# Patient Record
Sex: MALE | Race: WHITE | NOT HISPANIC OR LATINO | Employment: FULL TIME | URBAN - METROPOLITAN AREA
[De-identification: names, ages, dates, MRNs, and addresses within clinical notes are randomized per-mention and may not be internally consistent; named-entity substitution may affect disease eponyms.]

---

## 2017-06-22 DIAGNOSIS — E78.5 HYPERLIPIDEMIA LDL GOAL <130: ICD-10-CM

## 2017-06-22 NOTE — TELEPHONE ENCOUNTER
Pravastatin     Last Written Prescription Date: 05/25/16  Last Fill Quantity: 90, # refills: 3  Last Office Visit with G, P or [facility] prescribing provider: 09/15/16       Lab Results   Component Value Date    CHOL 203 05/21/2016     Lab Results   Component Value Date    HDL 36 05/21/2016     Lab Results   Component Value Date     05/21/2016     Lab Results   Component Value Date    TRIG 234 05/21/2016     Lab Results   Component Value Date    CHOLHDLRATIO 7.1 04/20/2015

## 2017-06-27 RX ORDER — PRAVASTATIN SODIUM 40 MG
TABLET ORAL
Qty: 30 TABLET | Refills: 0 | Status: SHIPPED | OUTPATIENT
Start: 2017-06-27 | End: 2017-08-09

## 2017-06-27 NOTE — TELEPHONE ENCOUNTER
Pravastatin  Last Written Prescription Date: 5/25/16  Last Fill Quantity: 90, # refills: 3  Last Office Visit with [facility], [facility] or [facility] prescribing provider: 9/15/16       Lab Results   Component Value Date    CHOL 203 05/21/2016     Lab Results   Component Value Date    HDL 36 05/21/2016     Lab Results   Component Value Date     05/21/2016     Lab Results   Component Value Date    TRIG 234 05/21/2016     Lab Results   Component Value Date    CHOLHDLRATIO 7.1 04/20/2015       Patient due for appt and labs  Patient has one week left of medication  Sent 30 days of Pravastatin to pharmacy  Appt scheduled 7/10/17  Transferred patient to appt line to schedule lab only appt  Future labs were ordered 5/25/16, lab reports they can extend those orders if he plans to come in soon  Lab will let us know if patient's labs need to be reordered    Jessica BAUTISTA Rn

## 2017-07-05 DIAGNOSIS — E03.9 HYPOTHYROIDISM, UNSPECIFIED TYPE: ICD-10-CM

## 2017-07-05 NOTE — LETTER
[facility]  [address] [address]  Phone: [phone]       July 13, 2017         Micah Lay  [address] [address] [facility] [address]            Dear Micah:    We are concerned about your health care.  We recently provided you with medication refills.  Many medications require routine follow-up with your doctor.  You are due for lab work and a clinic appointment for further refills of your medication.    Your prescription(s) have been refilled for one month so you may have time for the above noted follow-up. Please call to schedule soon so we can assure you have an appointment before your next refills are needed.    Thank you,      Dr. Barriga's Care Team

## 2017-07-06 NOTE — TELEPHONE ENCOUNTER
Levothyroxine     Last Written Prescription Date: 05/25/16  Last Quantity: 180, # refills: 3  Last Office Visit with G, [facility] or [facility] prescribing provider: 09/15/16   Next 5 appointments (look out 90 days)     Jul 10, 2017  6:00 PM CDT   [facility] CNP   [facility]    [address] [address]   [phone]                   TSH   Date Value Ref Range Status   05/21/2016 0.85 0.40 - 4.00 mU/L Final

## 2017-07-13 RX ORDER — LEVOTHYROXINE SODIUM 150 UG/1
TABLET ORAL
Qty: 60 TABLET | Refills: 0 | Status: SHIPPED | OUTPATIENT
Start: 2017-07-13 | End: 2017-08-15

## 2017-07-13 NOTE — TELEPHONE ENCOUNTER
Patient due for appointment and labs.  Refilled x1 per protocol.  Letter mailed to patient.    Lizeth Porter RN

## 2017-08-09 DIAGNOSIS — E78.5 HYPERLIPIDEMIA LDL GOAL <130: ICD-10-CM

## 2017-08-09 NOTE — TELEPHONE ENCOUNTER
Pravastatin     Last Written Prescription Date: 06/27/2017  Last Fill Quantity: 30, # refills: 0  Last Office Visit with [facility], [facility] or [facility] prescribing provider: 09/15/2016       Lab Results   Component Value Date    CHOL 203 05/21/2016     Lab Results   Component Value Date    HDL 36 05/21/2016     Lab Results   Component Value Date     05/21/2016     Lab Results   Component Value Date    TRIG 234 05/21/2016     Lab Results   Component Value Date    CHOLHDLRATIO 7.1 04/20/2015       Zurdo DriscollR)

## 2017-08-11 RX ORDER — PRAVASTATIN SODIUM 40 MG
TABLET ORAL
Qty: 30 TABLET | Refills: 0 | Status: SHIPPED | OUTPATIENT
Start: 2017-08-11 | End: 2017-09-11

## 2017-08-11 NOTE — TELEPHONE ENCOUNTER
Routing refill request to provider for review/approval because:  Sima given x1 and patient did not follow up, please advise  Labs not current:  Lab Results   Component Value Date      05/21/2016     Pleas advise refill.  GAVI Jackson

## 2017-08-15 ENCOUNTER — TELEPHONE (OUTPATIENT)
Dept: FAMILY MEDICINE | Facility: CLINIC | Age: 52
End: 2017-08-15

## 2017-08-15 DIAGNOSIS — E03.9 HYPOTHYROIDISM, UNSPECIFIED TYPE: ICD-10-CM

## 2017-08-15 NOTE — TELEPHONE ENCOUNTER
Levothyroxine     Last Written Prescription Date: 07/13/2017  Last Quantity: 60, # refills: 0  Last Office Visit with G, P or [facility] prescribing provider: 09/15/2016        TSH   Date Value Ref Range Status   05/21/2016 0.85 0.40 - 4.00 mU/L Final       Zurdo ALLISON (R)

## 2017-08-17 RX ORDER — LEVOTHYROXINE SODIUM 150 UG/1
TABLET ORAL
Qty: 30 TABLET | Refills: 0 | Status: SHIPPED | OUTPATIENT
Start: 2017-08-17 | End: 2017-09-01

## 2017-08-17 NOTE — TELEPHONE ENCOUNTER
Routing refill request to provider for review/approval because:  Sima given x1 and patient did not follow up, please advise  Labs not current:  Thyroid  Lab Results   Component Value Date    TSH 0.85 05/21/2016    TSH 6.56 04/20/2015         Millicent Chacon RNC

## 2017-08-21 DIAGNOSIS — N32.89 BLADDER SPASMS: ICD-10-CM

## 2017-08-21 NOTE — TELEPHONE ENCOUNTER
Oxybutynin      Last Written Prescription Date: 09/15/16  Last Fill Quantity: 30,  # refills: 11   Last Office Visit with G, [facility] or [facility] prescribing provider: 09/15/16

## 2017-08-21 NOTE — LETTER
[facility]  [address] [address]  Phone: [phone]       August 30, 2017         Micah Lay  [address] [address] [facility] [address]            Dear Micah:    We are concerned about your health care.  We recently provided you with medication refills.  Many medications require routine follow-up with your doctor.  You are due for a clinic appointment with Dr. Barriga for further refills of your medications.    Your prescription(s) have been refilled for one month so you may have time for the above noted follow-up. Please call to schedule soon so we can assure you have an appointment before your next refills are needed.    Thank you,      Dr. Barriga's Care Team

## 2017-08-30 RX ORDER — OXYBUTYNIN CHLORIDE 5 MG/1
TABLET ORAL
Qty: 30 TABLET | Refills: 0 | Status: SHIPPED | OUTPATIENT
Start: 2017-08-30 | End: 2017-09-11

## 2017-09-11 ENCOUNTER — OFFICE VISIT (OUTPATIENT)
Dept: FAMILY MEDICINE | Facility: CLINIC | Age: 52
End: 2017-09-11
Payer: COMMERCIAL

## 2017-09-11 VITALS
TEMPERATURE: 96.5 F | SYSTOLIC BLOOD PRESSURE: 133 MMHG | BODY MASS INDEX: 31.67 KG/M2 | HEART RATE: 62 BPM | DIASTOLIC BLOOD PRESSURE: 88 MMHG | WEIGHT: 209 LBS | HEIGHT: 68 IN

## 2017-09-11 DIAGNOSIS — N32.89 BLADDER SPASMS: ICD-10-CM

## 2017-09-11 DIAGNOSIS — Z12.11 SPECIAL SCREENING FOR MALIGNANT NEOPLASMS, COLON: ICD-10-CM

## 2017-09-11 DIAGNOSIS — E03.9 HYPOTHYROIDISM, UNSPECIFIED TYPE: Primary | ICD-10-CM

## 2017-09-11 DIAGNOSIS — E78.5 HYPERLIPIDEMIA LDL GOAL <130: ICD-10-CM

## 2017-09-11 DIAGNOSIS — Z12.5 SPECIAL SCREENING FOR MALIGNANT NEOPLASM OF PROSTATE: ICD-10-CM

## 2017-09-11 LAB
ALT SERPL W P-5'-P-CCNC: 37 U/L (ref 0–70)
CHOLEST SERPL-MCNC: 230 MG/DL
HDLC SERPL-MCNC: 38 MG/DL
LDLC SERPL CALC-MCNC: 135 MG/DL
NONHDLC SERPL-MCNC: 192 MG/DL
PSA SERPL-ACNC: 1.11 UG/L (ref 0–4)
T4 FREE SERPL-MCNC: 1.43 NG/DL (ref 0.76–1.46)
TRIGL SERPL-MCNC: 283 MG/DL
TSH SERPL DL<=0.005 MIU/L-ACNC: 1.53 MU/L (ref 0.4–4)

## 2017-09-11 PROCEDURE — 99214 OFFICE O/P EST MOD 30 MIN: CPT | Performed by: FAMILY MEDICINE

## 2017-09-11 PROCEDURE — 84443 ASSAY THYROID STIM HORMONE: CPT | Performed by: FAMILY MEDICINE

## 2017-09-11 PROCEDURE — G0103 PSA SCREENING: HCPCS | Performed by: FAMILY MEDICINE

## 2017-09-11 PROCEDURE — 80061 LIPID PANEL: CPT | Performed by: FAMILY MEDICINE

## 2017-09-11 PROCEDURE — 84439 ASSAY OF FREE THYROXINE: CPT | Performed by: FAMILY MEDICINE

## 2017-09-11 PROCEDURE — 84460 ALANINE AMINO (ALT) (SGPT): CPT | Performed by: FAMILY MEDICINE

## 2017-09-11 PROCEDURE — 36415 COLL VENOUS BLD VENIPUNCTURE: CPT | Performed by: FAMILY MEDICINE

## 2017-09-11 RX ORDER — PRAVASTATIN SODIUM 40 MG
40 TABLET ORAL DAILY
Qty: 90 TABLET | Refills: 3 | Status: CANCELLED | OUTPATIENT
Start: 2017-09-11

## 2017-09-11 RX ORDER — LEVOTHYROXINE SODIUM 150 UG/1
300 TABLET ORAL DAILY
Qty: 180 TABLET | Refills: 3 | Status: SHIPPED | OUTPATIENT
Start: 2017-09-11 | End: 2018-09-30

## 2017-09-11 RX ORDER — PRAVASTATIN SODIUM 40 MG
40 TABLET ORAL DAILY
Qty: 90 TABLET | Refills: 3 | Status: SHIPPED | OUTPATIENT
Start: 2017-09-11 | End: 2018-10-18

## 2017-09-11 RX ORDER — OXYBUTYNIN CHLORIDE 5 MG/1
5 TABLET ORAL DAILY
Qty: 90 TABLET | Refills: 1 | Status: SHIPPED | OUTPATIENT
Start: 2017-09-11 | End: 2017-09-28

## 2017-09-11 NOTE — PATIENT INSTRUCTIONS
Thank you for choosing [facility].  You may be receiving a survey in the mail from Sari Mace regarding your visit today.  Please take a few minutes to complete and return the survey to let us know how we are doing.      If you have questions or concerns, please contact us via [employer] or you can contact your care team at [phone].    Our Clinic hours are:  Monday 6:40 am  to 7:00 pm  Tuesday -Friday 6:40 am to 5:00 pm    The [place] outpatient lab hours are:  Monday - Friday 6:10 am to 4:45 pm  Saturdays 7:00 am to 11:00 am  Appointments are required, call [phone]    If you have clinical questions after hours or would like to schedule an appointment,  call the clinic at [phone].

## 2017-09-11 NOTE — PROGRESS NOTES
SUBJECTIVE:   Micah Lay is a 51 year old male who presents to clinic today for the following health issues:    Hyperlipidemia Follow-Up      Rate your low fat/cholesterol diet?: fair    Taking statin?  Yes, no muscle aches from statin    Other lipid medications/supplements?:  none    Hypothyroidism Follow-up      Since last visit, patient describes the following symptoms: Weight stable, no hair loss, no skin changes, no constipation, no loose stools        Amount of exercise or physical activity: work related     Problems taking medications regularly: No    Medication side effects: none  Diet: low fat/cholesterol    Patient will need refill on all medication listed below but would like an increase in oxybutynin and will wait for blood test on thyroid to see if same dose   Medication Followup of Pravastatin oxybutynin Levothyroxine     Taking Medication as prescribed: yes    Side Effects:  None    Medication Helping Symptoms:  Yes but oxybutynin needs to be increased          Patient is a 51 yr old male who comes in for his medication refills. Patient has a past medical history that is significant for hyperlipidemia , urinary frequency , and hypothyroidism. He reports that he has been working on losing weight and has lost about twenty pounds since his last visit and he is making some positive steps towards living healthier. Patient states that his cholesterol medication gives him mild aches and pains and was wondering if there were any alternatives. At some point he was prescribed some Crestor but cost was an issue.   He had a swelling in his sternum that he wanted us to take a look at. This swelling is not painful. It does not affect his breathing in any way.    Problem list and histories reviewed & adjusted, as indicated.  Additional history: as documented    Patient Active Problem List   Diagnosis     Screening for hypertension     Family history of colon cancer     Family history of aneurysm      "HYPERLIPIDEMIA LDL GOAL <130     Hypothyroidism, unspecified type     Bladder spasms     No past surgical history on file.    Social History   Substance Use Topics     Smoking status: Never Smoker     Smokeless tobacco: Never Used     Alcohol use No     Family History   Problem Relation Age of Onset     Arthritis Mother      Thyroid Disease Mother      Lipids Mother      Hypertension Father      Allergies Son      Neurologic Disorder Son      taking concerta     Connective Tissue Disorder Maternal Grandfather      Hypertension Brother      Obesity Brother      Lipids Brother      Cardiovascular Brother      luan         Current Outpatient Prescriptions   Medication Sig Dispense Refill     oxybutynin (DITROPAN) 5 MG tablet TAKE 1 TABLET BY MOUTH DAILY 30 tablet 0     levothyroxine (SYNTHROID/LEVOTHROID) 150 MCG tablet TAKE 2 TABLETS ONCE DAILY FOR TOTAL DAILY DOSE OF 300MCG. 30 tablet 0     pravastatin (PRAVACHOL) 40 MG tablet TAKE ONE TABLET BY MOUTH ONE TIME DAILY 30 tablet 0     Melatonin (MELATONIN TR) 10 MG TBCR Take 1 tablet by mouth At Bedtime       No Known Allergies  BP Readings from Last 3 Encounters:   09/11/17 133/88   09/15/16 (!) 137/92   05/25/16 129/85    Wt Readings from Last 3 Encounters:   09/11/17 209 lb (94.8 kg)   09/15/16 218 lb (98.9 kg)   05/25/16 216 lb 3.2 oz (98.1 kg)                  Labs reviewed in EPIC        Reviewed and updated as needed this visit by clinical staffAllergies       Reviewed and updated as needed this visit by Provider         ROS:  Constitutional, HEENT, cardiovascular, pulmonary, gi and gu systems are negative, except as otherwise noted.      OBJECTIVE:   /88  Pulse 62  Temp 96.5  F (35.8  C) (Tympanic)  Ht 5' 8.25\" (1.734 m)  Wt 209 lb (94.8 kg)  BMI 31.55 kg/m2  Body mass index is 31.55 kg/(m^2).  GENERAL: healthy, alert and no distress  EYES: Eyes grossly normal to inspection, PERRL and conjunctivae and sclerae normal  HENT: ear canals and TM's " normal, nose and mouth without ulcers or lesions  NECK: no adenopathy, no asymmetry, masses, or scars and thyroid normal to palpation  RESP: lungs clear to auscultation - no rales, rhonchi or wheezes  CV: regular rate and rhythm, normal S1 S2, no S3 or S4, no murmur, click or rub, no peripheral edema and peripheral pulses strong  ABDOMEN: soft, nontender, no hepatosplenomegaly, no masses and bowel sounds normal  MS: no gross musculoskeletal defects noted, no edema  SKIN: no suspicious lesions or rashes  PSYCH: mentation appears normal, affect normal/bright    Diagnostic Test Results:  Results for orders placed or performed in visit on 09/11/17 (from the past 24 hour(s))   ALT   Result Value Ref Range    ALT 37 0 - 70 U/L   Lipid panel reflex to direct LDL   Result Value Ref Range    Cholesterol 230 (H) <200 mg/dL    Triglycerides 283 (H) <150 mg/dL    HDL Cholesterol 38 (L) >39 mg/dL    LDL Cholesterol Calculated 135 (H) <100 mg/dL    Non HDL Cholesterol 192 (H) <130 mg/dL   TSH   Result Value Ref Range    TSH 1.53 0.40 - 4.00 mU/L   T4 FREE   Result Value Ref Range    T4 Free 1.43 0.76 - 1.46 ng/dL   PSA, screen   Result Value Ref Range    PSA 1.11 0 - 4 ug/L       ASSESSMENT/PLAN:   (E03.9) Hypothyroidism, unspecified type  (primary encounter diagnosis)  Comment: ;labs within normal limits, did not change dose of levothyroxine   Plan: levothyroxine (SYNTHROID/LEVOTHROID) 150 MCG         tablet            (E78.5) Hyperlipidemia LDL goal <130  Comment: Cholesterol still high , medication faxed  Plan: pravastatin (PRAVACHOL) 40 MG tablet            (Z12.5) Special screening for malignant neoplasm of prostate  Comment: Patient will be notified of results  Plan: PSA, screen            (Z12.11) Special screening for malignant neoplasms, colon  Comment: Colonoscopy ordered  Plan: GASTROENTEROLOGY ADULT REF PROCEDURE ONLY            (N32.89) Bladder spasms  Comment: Medication refilled  Plan: oxybutynin (DITROPAN) 5 MG  tablet              FUTURE APPOINTMENTS:       - Follow-up visit as needed.    Nadir Flaherty MD  [facility]

## 2017-09-11 NOTE — LETTER
September 11, 2017      Micah MOREIRA Alireza  [address] [address] [facility] [address]        Dear ,    We are writing to inform you of your test results.   Normal except lipids. The trig are stable.   The LDL is slightly high but no med change. Just stay on the diet more   Rigorously.     Resulted Orders   ALT   Result Value Ref Range    ALT 37 0 - 70 U/L   Lipid panel reflex to direct LDL   Result Value Ref Range    Cholesterol 230 (H) <200 mg/dL      Comment:      Desirable:       <200 mg/dl    Triglycerides 283 (H) <150 mg/dL      Comment:      Borderline high:  150-199 mg/dl  High:             200-499 mg/dl  Very high:       >499 mg/dl      HDL Cholesterol 38 (L) >39 mg/dL    LDL Cholesterol Calculated 135 (H) <100 mg/dL      Comment:      Above desirable:  100-129 mg/dl  Borderline High:  130-159 mg/dL  High:             160-189 mg/dL  Very high:       >189 mg/dl      Non HDL Cholesterol 192 (H) <130 mg/dL      Comment:      Above Desirable:  130-159 mg/dl  Borderline high:  160-189 mg/dl  High:             190-219 mg/dl  Very high:       >219 mg/dl     TSH   Result Value Ref Range    TSH 1.53 0.40 - 4.00 mU/L   T4 FREE   Result Value Ref Range    T4 Free 1.43 0.76 - 1.46 ng/dL       If you have any questions or concerns, please call the clinic at the number listed above.       Sincerely,    Dr. Barriga

## 2017-09-11 NOTE — MR AVS SNAPSHOT
After Visit Summary   9/11/2017    Micah Lay    MRN: [id number]           Patient Information     Date Of Birth          1965        Visit Information        Provider Department      9/11/2017 10:00 AM Nadir Flaherty MD [facility]        Today's Diagnoses     Special screening for malignant neoplasm of prostate    -  1    Hypothyroidism, unspecified type        Hyperlipidemia LDL goal <130        Special screening for malignant neoplasms, colon          Care Instructions          Thank you for choosing [facility].  You may be receiving a survey in the mail from Sari Mace regarding your visit today.  Please take a few minutes to complete and return the survey to let us know how we are doing.      If you have questions or concerns, please contact us via [employer] or you can contact your care team at [phone].    Our Clinic hours are:  Monday 6:40 am  to 7:00 pm  Tuesday -Friday 6:40 am to 5:00 pm    The [place] outpatient lab hours are:  Monday - Friday 6:10 am to 4:45 pm  Saturdays 7:00 am to 11:00 am  Appointments are required, call [phone]    If you have clinical questions after hours or would like to schedule an appointment,  call the clinic at [phone].          Follow-ups after your visit        Additional Services     GASTROENTEROLOGY ADULT REF PROCEDURE ONLY       Last Lab Result: Creatinine (mg/dL)       Date                     Value                 06/28/2014               0.91             ----------  Body mass index is 31.55 kg/(m^2).     Needed:  No  Language:  English    Patient will be contacted to schedule procedure.     Please be aware that coverage of these services is subject to the terms and limitations of your health insurance plan.  Call member services at your health plan with any benefit or coverage questions.  Any procedures must be performed at a [place] facility OR coordinated by your clinic's referral  "office.    Please bring the following with you to your appointment:    (1) Any X-Rays, CTs or MRIs which have been performed.  Contact the facility where they were done to arrange for  prior to your scheduled appointment.    (2) List of current medications   (3) This referral request   (4) Any documents/labs given to you for this referral                  Who to contact     If you have questions or need follow up information about today's clinic visit or your schedule please contact [facility] directly at [phone].  Normal or non-critical lab and imaging results will be communicated to you by [employer], letter or phone within 4 business days after the clinic has received the results. If you do not hear from us within 7 days, please contact the clinic through [employer] or phone. If you have a critical or abnormal lab result, we will notify you by phone as soon as possible.  Submit refill requests through [employer] or call your pharmacy and they will forward the refill request to us. Please allow 3 business days for your refill to be completed.          Additional Information About Your Visit        [employer] Information     [employer] gives you secure access to your electronic health record. If you see a primary care provider, you can also send messages to your care team and make appointments. If you have questions, please call your primary care clinic.  If you do not have a primary care provider, please call [phone] and they will assist you.        Care EveryWhere ID     This is your Care EveryWhere ID. This could be used by other organizations to access your [place] medical records  [id number]        Your Vitals Were     Pulse Temperature Height BMI (Body Mass Index)          62 96.5  F (35.8  C) (Tympanic) 5' 8.25\" (1.734 m) 31.55 kg/m2         Blood Pressure from Last 3 Encounters:   09/11/17 133/88   09/15/16 (!) 137/92   05/25/16 129/85    Weight from Last 3 Encounters:   09/11/17 209 lb " (94.8 kg)   09/15/16 218 lb (98.9 kg)   05/25/16 216 lb 3.2 oz (98.1 kg)              We Performed the Following     ALT     GASTROENTEROLOGY ADULT REF PROCEDURE ONLY     Lipid panel reflex to direct LDL     PSA, screen     T4 FREE     TSH        Primary Care Provider Office Phone # Fax #    Cassius Barriga [phone] [address] [facility] [address]        Equal Access to Services     VANESSA ORTIZ : Hadii aad ku hadasho Soomaali, waaxda luqadaha, qaybta kaalmada adeegyada, waxay idiin hayaan adeeg rishielaineluzmaria dunn . So [facility] [phone].    ATENCIÓN: Si amarilis espalexia, tiene a joaquin disposición servicios gratuitos de asistencia lingüística. [facility] [phone].    We comply with applicable federal civil rights laws and [place] laws. We do not discriminate on the basis of race, color, national origin, age, disability sex, sexual orientation or gender identity.            Thank you!     Thank you for choosing [facility]  for your care. Our goal is always to provide you with excellent care. Hearing back from our patients is one way we can continue to improve our services. Please take a few minutes to complete the written survey that you may receive in the mail after your visit with us. Thank you!             Your Updated Medication List - Protect others around you: Learn how to safely use, store and throw away your medicines at www.disposemymeds.org.          This list is accurate as of: 9/11/17 10:39 AM.  Always use your most recent med list.                   Brand Name Dispense Instructions for use Diagnosis    levothyroxine 150 MCG tablet    SYNTHROID/LEVOTHROID    30 tablet    TAKE 2 TABLETS ONCE DAILY FOR TOTAL DAILY DOSE OF 300MCG.    Hypothyroidism, unspecified type       MELATONIN TR 10 MG Tbcr   Generic drug:  Melatonin      Take 1 tablet by mouth At Bedtime        oxybutynin 5 MG tablet    DITROPAN    30 tablet    TAKE 1 TABLET BY MOUTH DAILY    Bladder spasms        pravastatin 40 MG tablet    PRAVACHOL    30 tablet    TAKE ONE TABLET BY MOUTH ONE TIME DAILY    Hyperlipidemia LDL goal <130

## 2017-09-28 ENCOUNTER — TELEPHONE (OUTPATIENT)
Dept: FAMILY MEDICINE | Facility: CLINIC | Age: 52
End: 2017-09-28

## 2017-09-28 DIAGNOSIS — N32.89 BLADDER SPASMS: ICD-10-CM

## 2017-09-28 RX ORDER — OXYBUTYNIN CHLORIDE 10 MG/1
10 TABLET, EXTENDED RELEASE ORAL DAILY
Qty: 30 TABLET | Refills: 11 | Status: SHIPPED | OUTPATIENT
Start: 2017-09-28 | End: 2018-10-08

## 2017-09-28 NOTE — TELEPHONE ENCOUNTER
Please notify prescription sent to pharmacy.  I increased the dose to 10 mg daily of the XR type. If this is effective  Then refills are done. Cassius Barriga

## 2017-09-28 NOTE — TELEPHONE ENCOUNTER
Reason for Call:  Other wrong strength sent in.    Detailed comments: pt calling in stating he had Oxybutynin sent in for 5 mg on 9-11-17. He was wondering if he could get 7 mg as he is still having issues. He states they talked about this at the visit.    ASSESSMENT/PLAN:   (E03.9) Hypothyroidism, unspecified type  (primary encounter diagnosis)  Comment: ;labs within normal limits, did not change dose of levothyroxine   Plan: levothyroxine (SYNTHROID/LEVOTHROID) 150 MCG         tablet         (E78.5) Hyperlipidemia LDL goal <130  Comment: Cholesterol still high , medication faxed  Plan: pravastatin (PRAVACHOL) 40 MG tablet         (Z12.5) Special screening for malignant neoplasm of prostate  Comment: Patient will be notified of results  Plan: PSA, screen         (Z12.11) Special screening for malignant neoplasms, colon  Comment: Colonoscopy ordered  Plan: GASTROENTEROLOGY ADULT REF PROCEDURE ONLY         (N32.89) Bladder spasms  Comment: Medication refilled  Plan: oxybutynin (DITROPAN) 5 MG tablet            FUTURE APPOINTMENTS:       - Follow-up visit as needed.     Nadir Flaherty MD  [facility]       Phone Number Patient can be reached at: Home number on file [phone] (home)    Best Time: any    Can we leave a detailed message on this number? YES    Call taken on 9/28/2017 at 2:01 PM by Lina Ulloa

## 2017-09-28 NOTE — TELEPHONE ENCOUNTER
Dr SEARS?   He is asking for dose increase in ditropan?   I only see 5-10-15 mg, not 7 to pull up?   Millicent Chacon RNC

## 2017-10-21 ENCOUNTER — HOSPITAL ENCOUNTER (EMERGENCY)
Facility: CLINIC | Age: 52
Discharge: HOME | End: 2017-10-21
Attending: NURSE PRACTITIONER | Admitting: NURSE PRACTITIONER
Payer: COMMERCIAL

## 2017-10-21 VITALS
OXYGEN SATURATION: 97 % | TEMPERATURE: 98.1 F | RESPIRATION RATE: 20 BRPM | HEART RATE: 69 BPM | SYSTOLIC BLOOD PRESSURE: 130 MMHG | DIASTOLIC BLOOD PRESSURE: 81 MMHG

## 2017-10-21 DIAGNOSIS — J20.9 ACUTE BRONCHITIS, UNSPECIFIED ORGANISM: ICD-10-CM

## 2017-10-21 PROCEDURE — 99213 OFFICE O/P EST LOW 20 MIN: CPT | Performed by: NURSE PRACTITIONER

## 2017-10-21 PROCEDURE — 99213 OFFICE O/P EST LOW 20 MIN: CPT

## 2017-10-21 RX ORDER — AZITHROMYCIN 250 MG/1
TABLET, FILM COATED ORAL
Qty: 6 TABLET | Refills: 0 | Status: SHIPPED | OUTPATIENT
Start: 2017-10-21 | End: 2017-10-26

## 2017-10-21 RX ORDER — PREDNISONE 20 MG/1
TABLET ORAL
Qty: 10 TABLET | Refills: 0 | Status: SHIPPED | OUTPATIENT
Start: 2017-10-21 | End: 2017-11-13

## 2017-10-21 NOTE — ED PROVIDER NOTES
History   No chief complaint on file.    HPI  Micah Lay is a 52 year old male who presents to urgent care for evaluation of cough, wheezing, and nasal congestion.  Symptoms started 1 week ago and gradually worsening over the last 2 days.  Cough is productive, brown/yellow tinged sputum. Denies fever, chest pain or shortness of breath. Patient is here with his son who is also being evaluated for same symptoms.    Problem List:    Patient Active Problem List    Diagnosis Date Noted     Bladder spasms 09/15/2016     Priority: Medium     Hypothyroidism, unspecified type 05/25/2016     Priority: Medium     HYPERLIPIDEMIA LDL GOAL <130 10/31/2010     Priority: Medium     Family history of colon cancer 08/23/2010     Priority: Medium     Family history of aneurysm 08/23/2010     Priority: Medium     Screening for hypertension 11/03/2008     Priority: Medium        Past Medical History:    Past Medical History:   Diagnosis Date     Other and unspecified hyperlipidemia may 2006     Sprain of lumbar region      Unspecified hypothyroidism        Past Surgical History:    History reviewed. No pertinent surgical history.    Family History:    Family History   Problem Relation Age of Onset     Arthritis Mother      Thyroid Disease Mother      Lipids Mother      Hypertension Father      Allergies Son      Neurologic Disorder Son      taking concerta     Connective Tissue Disorder Maternal Grandfather      Hypertension Brother      Obesity Brother      Lipids Brother      Cardiovascular Brother      ansyerusm       Social History:  Marital Status:   [2]  Social History   Substance Use Topics     Smoking status: Never Smoker     Smokeless tobacco: Never Used     Alcohol use No        Medications:      predniSONE (DELTASONE) 20 MG tablet   azithromycin (ZITHROMAX Z-KALEE) 250 MG tablet   oxybutynin (DITROPAN XL) 10 MG 24 hr tablet   pravastatin (PRAVACHOL) 40 MG tablet   levothyroxine (SYNTHROID/LEVOTHROID) 150 MCG  tablet   levothyroxine (SYNTHROID/LEVOTHROID) 150 MCG tablet   Melatonin (MELATONIN TR) 10 MG TBCR         Review of Systems  As mentioned above in the history present illness. All other systems were reviewed and are negative.    Physical Exam   BP: 130/81  Pulse: 69  Temp: 98.1  F (36.7  C)  Resp: 20  SpO2: 97 %      Physical Exam    GENERAL APPEARANCE: healthy, alert and no distress  EYES: EOMI,  PERRL, conjunctiva clear  HENT: ear canals and TM's normal.  Nose and mouth without ulcers, erythema or lesions  NECK: supple, nontender, no lymphadenopathy  RESP: faint expiratory wheezing throughout. No rales or rhonchi noted.   CV: regular rates and rhythm, normal S1 S2, no murmur noted    ED Course     ED Course     Procedures               Labs Ordered and Resulted from Time of ED Arrival Up to the Time of Departure from the ED - No data to display    Assessments & Plan (with Medical Decision Making)   DDx:  bronchitis, pneumonia, Viral URI (ie.. Influenza), sinusitis    -worsening URI/cough symptoms. Discussed course of viral illness.  Patient's son has been ill for 3 weeks and patient worsening. Faint expiratory wheezing throughout. Prescription for prednisone given wheezing and Z-kalee to cover for bacterial pathogen. Worrisome reasons to return discussed.  I have reviewed the nursing notes.    I have reviewed the findings, diagnosis, plan and need for follow up with the patient.    Discharge Medication List as of 10/21/2017  6:16 PM      START taking these medications    Details   predniSONE (DELTASONE) 20 MG tablet Take two tablets (= 40mg) each day for 5 (five) days, Disp-10 tablet, R-0, E-Prescribe      azithromycin (ZITHROMAX Z-KALEE) 250 MG tablet Two tablets on the first day, then one tablet daily for the next 4 days, Disp-6 tablet, R-0, E-Prescribe             Final diagnoses:   Acute bronchitis, unspecified organism       10/21/2017   [facility] EMERGENCY DEPARTMENT     Roseanna, FLORENCE Velazquez  CNP  10/21/17 2002

## 2017-10-21 NOTE — ED NOTES
Patient here for cough/congestoin, symptoms started 7 days ago.  Patient presents ambulatory to the urgent care.

## 2017-10-21 NOTE — ED AVS SNAPSHOT
[facility] Emergency Department    [address] [address]    Phone:  [phone]    Fax:  [phone]                                       Micah Lay   MRN: [id number]    Department:  [facility] Emergency Department   Date of Visit:  10/21/2017           After Visit Summary Signature Page     I have received my discharge instructions, and my questions have been answered. I have discussed any challenges I see with this plan with the nurse or doctor.    ..........................................................................................................................................  Patient/Patient Representative Signature      ..........................................................................................................................................  Patient Representative Print Name and Relationship to Patient    ..................................................               ................................................  Date                                            Time    ..........................................................................................................................................  Reviewed by Signature/Title    ...................................................              ..............................................  Date                                                            Time

## 2017-10-21 NOTE — DISCHARGE INSTRUCTIONS
Bronchitis with Wheezing (Viral or Bacterial: Adult)    Bronchitis is an infection of the air passages. It often occurs during a cold and is usually caused by a virus. Symptoms include cough with mucus (phlegm) and low-grade fever. This illness is contagious during the first few days and is spread through the air by coughing and sneezing, or by direct contact (touching the sick person and then touching your own eyes, nose, or mouth).  If there is a lot of inflammation, air flow is restricted. The air passages may also go into spasm, especially if you have asthma. This causes wheezing and difficulty breathing even in people who do not have asthma.  Bronchitis usually lasts 7 to 14 days. The wheezing should improve with treatment during the first week. An inhaler is often prescribed to relax the air passages and stop wheezing. Antibiotics will be prescribed if your doctor thinks there is also a secondary bacterial infection.  Home care    If symptoms are severe, rest at home for the first 2 to 3 days. When you go back to your usual activities, don't let yourself get too tired.    Do not smoke. Also avoid being exposed to secondhand smoke.    You may use over-the-counter medicine to control fever or pain, unless another medicine was prescribed. Note: If you have chronic liver or kidney disease or have ever had a stomach ulcer or gastrointestinal bleeding, talk with your healthcare provider before using these medicines. Also talk to your provider if you are taking medicine to prevent blood clots.) Aspirin should never be given to anyone younger than 18 years of age who is ill with a viral infection or fever. It may cause severe liver or brain damage.    Your appetite may be poor, so a light diet is fine. Avoid dehydration by drinking 6 to 8 glasses of fluids per day (such as water, soft drinks, sports drinks, juices, tea, or soup). Extra fluids will help loosen secretions in the nose and lungs.    Over-the-counter  cough, cold, and sore-throat medicines will not shorten the length of the illness, but they may be helpful to reduce symptoms. (Note: Do not use decongestants if you have high blood pressure.)    If you were given an inhaler, use it exactly as directed. If you need to use it more often than prescribed, your condition may be worsening. If this happens, contact your healthcare provider.    If prescribed, finish all antibiotic medicine, even if you are feeling better after only a few days.  Follow-up care  Follow up with your healthcare provider, or as advised. If you had an X-ray or ECG (electrocardiogram), a specialist will review it. You will be notified of any new findings that may affect your care.  Note: If you are age 65 or older, or if you have a chronic lung disease or condition that affects your immune system, or you smoke, talk to your healthcare provider about having a pneumococcal vaccinations and a yearly influenza vaccination (flu shot).  When to seek medical advice  Call your healthcare provider right away if any of these occur:    Fever of 100.4 F (38 C) or higher    Coughing up increasing amounts of colored sputum    Weakness, drowsiness, headache, facial pain, ear pain, or a stiff neck  Call 911, or get immediate medical care  Contact emergency services right away if any of these occur.    Coughing up blood    Worsening weakness, drowsiness, headache, or stiff neck    Increased wheezing not helped with medication, shortness of breath, or pain with breathing  Date Last Reviewed: 9/13/2015 2000-2017 [employer]. [address] [place] [address], [place] [address]. All rights reserved. This information is not intended as a substitute for professional medical care. Always follow your healthcare professional's instructions.

## 2017-10-25 ENCOUNTER — HOSPITAL ENCOUNTER (EMERGENCY)
Facility: CLINIC | Age: 52
Discharge: HOME | End: 2017-10-25
Attending: NURSE PRACTITIONER | Admitting: NURSE PRACTITIONER
Payer: COMMERCIAL

## 2017-10-25 VITALS
SYSTOLIC BLOOD PRESSURE: 141 MMHG | OXYGEN SATURATION: 95 % | TEMPERATURE: 98.2 F | HEART RATE: 66 BPM | RESPIRATION RATE: 16 BRPM | DIASTOLIC BLOOD PRESSURE: 81 MMHG

## 2017-10-25 DIAGNOSIS — R05.8 POST-VIRAL COUGH SYNDROME: Primary | ICD-10-CM

## 2017-10-25 PROCEDURE — 99213 OFFICE O/P EST LOW 20 MIN: CPT

## 2017-10-25 PROCEDURE — 99213 OFFICE O/P EST LOW 20 MIN: CPT | Performed by: NURSE PRACTITIONER

## 2017-10-25 RX ORDER — ALBUTEROL SULFATE 90 UG/1
2 AEROSOL, METERED RESPIRATORY (INHALATION) EVERY 6 HOURS PRN
Qty: 1 INHALER | Refills: 0 | Status: SHIPPED | OUTPATIENT
Start: 2017-10-25 | End: 2018-10-18

## 2017-10-25 ASSESSMENT — ENCOUNTER SYMPTOMS
CHILLS: 0
ARTHRALGIAS: 0
FEVER: 0
EYE PAIN: 0
TROUBLE SWALLOWING: 1
RHINORRHEA: 0
CONSTIPATION: 0
ABDOMINAL PAIN: 0
DIARRHEA: 0
COUGH: 1
SINUS PRESSURE: 0
NAUSEA: 0
VOMITING: 0
SORE THROAT: 1
APPETITE CHANGE: 0
SINUS PAIN: 0

## 2017-10-25 NOTE — ED PROVIDER NOTES
History     Chief Complaint   Patient presents with     Cough     not improving with abx     HPI  Micah Lay is a 52 year old male who presents to the Urgent Care with a persistent dry cough. Pt states the cough began two weeks ago accompanied with shortness of breath, dysphagia to liquids and sore throat. Pt denies recent fever, chills, otalgia, nausea or vomiting. Pt was sought treatment for symptoms at the Urgent Care last Saturday when he was prescribed Azithromycin and Prednisone. Pt also states he has tried Nyquil and Mucinex to alleviate symptoms. Pt states all treatments tried have not improved or alleviated symptoms and has concern for an underlying cause. Pt further states that symptoms of shortness of breath and cough are increased with exertion.     Problem List:    Patient Active Problem List    Diagnosis Date Noted     Bladder spasms 09/15/2016     Priority: Medium     Hypothyroidism, unspecified type 05/25/2016     Priority: Medium     HYPERLIPIDEMIA LDL GOAL <130 10/31/2010     Priority: Medium     Family history of colon cancer 08/23/2010     Priority: Medium     Family history of aneurysm 08/23/2010     Priority: Medium     Screening for hypertension 11/03/2008     Priority: Medium        Past Medical History:    Past Medical History:   Diagnosis Date     Other and unspecified hyperlipidemia may 2006     Sprain of lumbar region      Unspecified hypothyroidism        Past Surgical History:    No past surgical history on file.    Family History:    Family History   Problem Relation Age of Onset     Arthritis Mother      Thyroid Disease Mother      Lipids Mother      Hypertension Father      Allergies Son      Neurologic Disorder Son      taking concerta     Connective Tissue Disorder Maternal Grandfather      Hypertension Brother      Obesity Brother      Lipids Brother      Cardiovascular Brother      ansyerusm       Social History:  Marital Status:   [2]  Social History   Substance  Use Topics     Smoking status: Never Smoker     Smokeless tobacco: Never Used     Alcohol use No        Medications:      albuterol (PROAIR HFA/PROVENTIL HFA/VENTOLIN HFA) 108 (90 BASE) MCG/ACT Inhaler   predniSONE (DELTASONE) 20 MG tablet   azithromycin (ZITHROMAX Z-KALEE) 250 MG tablet   oxybutynin (DITROPAN XL) 10 MG 24 hr tablet   pravastatin (PRAVACHOL) 40 MG tablet   levothyroxine (SYNTHROID/LEVOTHROID) 150 MCG tablet   levothyroxine (SYNTHROID/LEVOTHROID) 150 MCG tablet   Melatonin (MELATONIN TR) 10 MG TBCR       Review of Systems   Constitutional: Negative.  Negative for appetite change, chills and fever.   HENT: Positive for congestion, sore throat and trouble swallowing (liquids ). Negative for ear pain, postnasal drip, rhinorrhea, sinus pain and sinus pressure.    Eyes: Negative for pain.   Respiratory: Positive for cough (dry with occasional mucous production ), shortness of breath (with excessive activity) and wheezing (intermittently). Negative for chest tightness. Choking: liquids     Cardiovascular: Negative for chest pain.   Gastrointestinal: Negative for abdominal pain, constipation, diarrhea, nausea and vomiting.   Musculoskeletal: Negative for arthralgias.   Skin: Negative for rash.   Allergic/Immunologic: Negative for environmental allergies and food allergies.   Neurological: Negative.      Physical Exam   BP: 141/81  Pulse: 66  Temp: 98.2  F (36.8  C)  Resp: 16  SpO2: 95 %    Physical Exam   Constitutional: He is oriented to person, place, and time. He appears well-developed and well-nourished. No distress.   HENT:   Head: Normocephalic.   Right Ear: External ear normal. No swelling or tenderness. Tympanic membrane is not erythematous and not bulging. No decreased hearing is noted.   Left Ear: External ear normal. No swelling or tenderness. Tympanic membrane is not erythematous and not bulging. No decreased hearing is noted.   Mouth/Throat: No trismus in the jaw. No uvula swelling. No  oropharyngeal exudate, posterior oropharyngeal edema or tonsillar abscesses. Posterior oropharyngeal erythema: mild.   Neck: No thyromegaly present.   Cardiovascular: Normal rate, regular rhythm, normal heart sounds and intact distal pulses.  Exam reveals no gallop and no friction rub.    No murmur heard.  Pulmonary/Chest: Effort normal. No stridor. No tachypnea. No respiratory distress. He has no decreased breath sounds. He has no wheezes. He has rhonchi (improves with coughing ) in the right lower field. He has no rales.   Neurological: He is alert and oriented to person, place, and time.   Skin: No rash noted. He is not diaphoretic.   Psychiatric: He has a normal mood and affect. His behavior is normal.       ED Course     ED Course     Procedures    Labs Ordered and Resulted from Time of ED Arrival Up to the Time of Departure from the ED - No data to display    Assessments & Plan (with Medical Decision Making)     I have reviewed the nursing notes.    I have reviewed the findings, diagnosis, plan and need for follow up with the patient.    ASSESSMENT: Post Viral Cough  DDx: Post Viral Cough, Bronchitis, Pneumonia, Viral Pharyngitis, Chronic Sinusitis   PLAN: Pt was given the option to have a CXR due to shortness of breath symptoms but pt declined. Pt was educated on diagnosis of post viral cough which is common to persist after treatment for bronchitis and pneumonia. Pt was further informed that his recent treatment with antibiotics and steroids would have treated bronchitis and pneumonia if taken as prescribed. Pt was given Albuterol inhaler 2 puffs BID for treatment of post viral cough and educated on administration of inhaler. Pt was informed of common side effects of Albuterol such as palpitations or a hand tremor which are normal and not concerning. Pt was educated that the Albuterol lasts 4-6 hours and he should take more puffs when symptoms seem to worsen.   Pt understands and is in agreement to plan.    Patient was seen with student who acted as my scribe and was present for learning. I personally assessed, examined and made medical decisions reflected in the documentation. Any necessary edits to the note have been made by myself. FLORENCE Phillip CNP    Discharge Medication List as of 10/25/2017  6:18 PM      START taking these medications    Details   albuterol (PROAIR HFA/PROVENTIL HFA/VENTOLIN HFA) 108 (90 BASE) MCG/ACT Inhaler Inhale 2 puffs into the lungs every 6 hours as needed for shortness of breath / dyspnea or wheezing, Disp-1 Inhaler, R-0, E-Prescribe             Final diagnoses:   Post-viral cough syndrome       10/25/2017   [facility] EMERGENCY DEPARTMENT     Jaimee Ruff APRN CNP  10/26/17 1300

## 2017-10-25 NOTE — ED AVS SNAPSHOT
[facility] Emergency Department    [address] [address]    Phone:  [phone]    Fax:  [phone]                                       Micah Lay   MRN: [id number]    Department:  [facility] Emergency Department   Date of Visit:  10/25/2017           Patient Information     Date Of Birth          1965        Your diagnoses for this visit were:     Post-viral cough syndrome        You were seen by Jaimee Ruff APRN CNP.      Follow-up Information     Follow up with Cassius Barriga MD In 1 week.    Specialty:  Family Practice    Why:  As needed, If symptoms worsen    Contact information:    [address] [facility] [address] [place] [address]  [phone]        Discharge References/Attachments     VIRAL SYNDROME (ADULT) (ENGLISH)      24 Hour Appointment Hotline       To make an appointment at any [facility] clinic, call [phone] ([phone]). If you don't have a family doctor or clinic, we will help you find one. [facility] clinics are conveniently located to serve the needs of you and your family.          ED Discharge Orders     AEROCHAMBER                    Review of your medicines      START taking        Dose / Directions Last dose taken    albuterol 108 (90 BASE) MCG/ACT Inhaler   Commonly known as:  PROAIR HFA/PROVENTIL HFA/VENTOLIN HFA   Dose:  2 puff   Quantity:  1 Inhaler        Inhale 2 puffs into the lungs every 6 hours as needed for shortness of breath / dyspnea or wheezing   Refills:  0          Our records show that you are taking the medicines listed below. If these are incorrect, please call your family doctor or clinic.        Dose / Directions Last dose taken    azithromycin 250 MG tablet   Commonly known as:  ZITHROMAX Z-KALEE   Quantity:  6 tablet        Two tablets on the first day, then one tablet daily for the next 4 days   Refills:  0        * levothyroxine 150 MCG tablet   Commonly known as:  SYNTHROID/LEVOTHROID   Quantity:  30 tablet         TAKE 2 TABLETS ONCE DAILY FOR TOTAL DAILY DOSE OF 300MCG.   Refills:  0        * levothyroxine 150 MCG tablet   Commonly known as:  SYNTHROID/LEVOTHROID   Dose:  300 mcg   Quantity:  180 tablet        Take 2 tablets (300 mcg) by mouth daily   Refills:  3        MELATONIN TR 10 MG Tbcr   Dose:  1 tablet   Generic drug:  Melatonin        Take 1 tablet by mouth At Bedtime   Refills:  0        oxybutynin 10 MG 24 hr tablet   Commonly known as:  DITROPAN XL   Dose:  10 mg   Quantity:  30 tablet        Take 1 tablet (10 mg) by mouth daily   Refills:  11        pravastatin 40 MG tablet   Commonly known as:  PRAVACHOL   Dose:  40 mg   Quantity:  90 tablet        Take 1 tablet (40 mg) by mouth daily   Refills:  3        predniSONE 20 MG tablet   Commonly known as:  DELTASONE   Quantity:  10 tablet        Take two tablets (= 40mg) each day for 5 (five) days   Refills:  0        * Notice:  This list has 2 medication(s) that are the same as other medications prescribed for you. Read the directions carefully, and ask your doctor or other care provider to review them with you.            Prescriptions were sent or printed at these locations (1 Prescription)                   [facility] [place], [place] - [address]   [address] [address]    Telephone:  [phone]   Fax:  [phone]   Hours:                  E-Prescribed (1 of 1)         albuterol (PROAIR HFA/PROVENTIL HFA/VENTOLIN HFA) 108 (90 BASE) MCG/ACT Inhaler                Orders Needing Specimen Collection     None      Pending Results     No orders found from 10/23/2017 to 10/26/2017.            Pending Culture Results     No orders found from 10/23/2017 to 10/26/2017.            Pending Results Instructions     If you had any lab results that were not finalized at the time of your Discharge, you can call the ED Lab Result RN at [phone]. You will be contacted by this team for any positive Lab results or changes in  treatment. The nurses are available 7 days a week from 10A to 6:30P.  You can leave a message 24 hours per day and they will return your call.        Test Results From Your Hospital Stay               Thank you for choosing [facility]       Thank you for choosing [facility] for your care. Our goal is always to provide you with excellent care. Hearing back from our patients is one way we can continue to improve our services. Please take a few minutes to complete the written survey that you may receive in the mail after you visit with us. Thank you!        [employer] Information     [facility] gives you secure access to your electronic health record. If you see a primary care provider, you can also send messages to your care team and make appointments. If you have questions, please call your primary care clinic.  If you do not have a primary care provider, please call [phone] and they will assist you.        Care EveryWhere ID     This is your Care EveryWhere ID. This could be used by other organizations to access your [facility] medical records  [id number]        Equal Access to Services     VANESSA ORTIZ AH: Francesco Schaeffer, wakaitlynnda paradise, qarishita kaalnusrat foster, hiram de la paz. So [facility] [phone].    ATENCIÓN: Si habla español, tiene a joaquin disposición servicios gratuitos de asistencia lingüística. Llame al [phone].    We comply with applicable federal civil rights laws and [place] laws. We do not discriminate on the basis of race, color, national origin, age, disability, sex, sexual orientation, or gender identity.            After Visit Summary       This is your record. Keep this with you and show to your community pharmacist(s) and doctor(s) at your next visit.

## 2017-10-25 NOTE — ED AVS SNAPSHOT
[facility] Emergency Department    [address] [address]    Phone:  [phone]    Fax:  [phone]                                       Micah Lay   MRN: [id number]    Department:  [facility] Emergency Department   Date of Visit:  10/25/2017           After Visit Summary Signature Page     I have received my discharge instructions, and my questions have been answered. I have discussed any challenges I see with this plan with the nurse or doctor.    ..........................................................................................................................................  Patient/Patient Representative Signature      ..........................................................................................................................................  Patient Representative Print Name and Relationship to Patient    ..................................................               ................................................  Date                                            Time    ..........................................................................................................................................  Reviewed by Signature/Title    ...................................................              ..............................................  Date                                                            Time

## 2017-10-26 ASSESSMENT — ENCOUNTER SYMPTOMS
SHORTNESS OF BREATH: 1
CONSTITUTIONAL NEGATIVE: 1
WHEEZING: 1
NEUROLOGICAL NEGATIVE: 1
CHEST TIGHTNESS: 0

## 2017-11-05 ENCOUNTER — APPOINTMENT (OUTPATIENT)
Dept: GENERAL RADIOLOGY | Facility: CLINIC | Age: 52
End: 2017-11-05
Attending: FAMILY MEDICINE
Payer: COMMERCIAL

## 2017-11-05 ENCOUNTER — HOSPITAL ENCOUNTER (EMERGENCY)
Facility: CLINIC | Age: 52
Discharge: HOME | End: 2017-11-05
Attending: FAMILY MEDICINE | Admitting: FAMILY MEDICINE
Payer: COMMERCIAL

## 2017-11-05 VITALS
SYSTOLIC BLOOD PRESSURE: 151 MMHG | TEMPERATURE: 98 F | BODY MASS INDEX: 31.85 KG/M2 | RESPIRATION RATE: 18 BRPM | WEIGHT: 211 LBS | OXYGEN SATURATION: 97 % | DIASTOLIC BLOOD PRESSURE: 92 MMHG

## 2017-11-05 DIAGNOSIS — R05.8 POST-VIRAL COUGH SYNDROME: ICD-10-CM

## 2017-11-05 PROCEDURE — 99283 EMERGENCY DEPT VISIT LOW MDM: CPT | Mod: 25 | Performed by: FAMILY MEDICINE

## 2017-11-05 PROCEDURE — 99283 EMERGENCY DEPT VISIT LOW MDM: CPT | Mod: Z6 | Performed by: FAMILY MEDICINE

## 2017-11-05 PROCEDURE — 71020 XR CHEST 2 VW: CPT

## 2017-11-05 NOTE — ED NOTES
Discharge instructions explained to patient. Pt acknowledges understanding. All questions answered.

## 2017-11-05 NOTE — ED AVS SNAPSHOT
[facility] Emergency Department    [address] [address]    Phone:  [phone]    Fax:  [phone]                                       Micah Lay   MRN: [id number]    Department:  [facility] Emergency Department   Date of Visit:  11/5/2017           After Visit Summary Signature Page     I have received my discharge instructions, and my questions have been answered. I have discussed any challenges I see with this plan with the nurse or doctor.    ..........................................................................................................................................  Patient/Patient Representative Signature      ..........................................................................................................................................  Patient Representative Print Name and Relationship to Patient    ..................................................               ................................................  Date                                            Time    ..........................................................................................................................................  Reviewed by Signature/Title    ...................................................              ..............................................  Date                                                            Time

## 2017-11-05 NOTE — DISCHARGE INSTRUCTIONS
Return to the Emergency Room if the following occurs:     Worsened breathing, fever >101, or for any concern at anytime.    Or, follow-up with the following provider as we discussed:     Return to your primary doctor as needed.    Medications discussed:    None new.  No changes.    If you received pain-relieving or sedating medication during your time in the ER, avoid alcohol, driving automobiles, or working with machinery.  Also, a responsible adult must stay with you.        Call the Nurse Advice Line at [phone] or [phone] for any concern at anytime.

## 2017-11-05 NOTE — ED AVS SNAPSHOT
[facility] Emergency Department    [address] [address]    Phone:  [phone]    Fax:  [phone]                                       Micah Lay   MRN: [id number]    Department:  [facility] Emergency Department   Date of Visit:  11/5/2017           Patient Information     Date Of Birth          1965        Your diagnoses for this visit were:     Post-viral cough syndrome        You were seen by Durga Jones MD.        Discharge Instructions       Return to the Emergency Room if the following occurs:     Worsened breathing, fever >101, or for any concern at anytime.    Or, follow-up with the following provider as we discussed:     Return to your primary doctor as needed.    Medications discussed:    None new.  No changes.    If you received pain-relieving or sedating medication during your time in the ER, avoid alcohol, driving automobiles, or working with machinery.  Also, a responsible adult must stay with you.        Call the Nurse Advice Line at [phone] or [phone] for any concern at anytime.      24 Hour Appointment Hotline       To make an appointment at any [facility] clinic, call [phone] ([phone]). If you don't have a family doctor or clinic, we will help you find one. [facility] clinics are conveniently located to serve the needs of you and your family.             Review of your medicines      Our records show that you are taking the medicines listed below. If these are incorrect, please call your family doctor or clinic.        Dose / Directions Last dose taken    albuterol 108 (90 BASE) MCG/ACT Inhaler   Commonly known as:  PROAIR HFA/PROVENTIL HFA/VENTOLIN HFA   Dose:  2 puff   Quantity:  1 Inhaler        Inhale 2 puffs into the lungs every 6 hours as needed for shortness of breath / dyspnea or wheezing   Refills:  0        * levothyroxine 150 MCG tablet   Commonly known as:  SYNTHROID/LEVOTHROID   Quantity:  30 tablet         TAKE 2 TABLETS ONCE DAILY FOR TOTAL DAILY DOSE OF 300MCG.   Refills:  0        * levothyroxine 150 MCG tablet   Commonly known as:  SYNTHROID/LEVOTHROID   Dose:  300 mcg   Quantity:  180 tablet        Take 2 tablets (300 mcg) by mouth daily   Refills:  3        MELATONIN TR 10 MG Tbcr   Dose:  1 tablet   Generic drug:  Melatonin        Take 1 tablet by mouth At Bedtime   Refills:  0        oxybutynin 10 MG 24 hr tablet   Commonly known as:  DITROPAN XL   Dose:  10 mg   Quantity:  30 tablet        Take 1 tablet (10 mg) by mouth daily   Refills:  11        pravastatin 40 MG tablet   Commonly known as:  PRAVACHOL   Dose:  40 mg   Quantity:  90 tablet        Take 1 tablet (40 mg) by mouth daily   Refills:  3        predniSONE 20 MG tablet   Commonly known as:  DELTASONE   Quantity:  10 tablet        Take two tablets (= 40mg) each day for 5 (five) days   Refills:  0        * Notice:  This list has 2 medication(s) that are the same as other medications prescribed for you. Read the directions carefully, and ask your doctor or other care provider to review them with you.            Procedures and tests performed during your visit     Chest XR,  PA & LAT      Orders Needing Specimen Collection     None      Pending Results     No orders found from 11/3/2017 to 11/6/2017.            Pending Culture Results     No orders found from 11/3/2017 to 11/6/2017.            Pending Results Instructions     If you had any lab results that were not finalized at the time of your Discharge, you can call the ED Lab Result RN at [phone]. You will be contacted by this team for any positive Lab results or changes in treatment. The nurses are available 7 days a week from 10A to 6:30P.  You can leave a message 24 hours per day and they will return your call.        Test Results From Your Hospital Stay        11/5/2017  2:22 PM      Narrative     XR CHEST 2 VW 11/5/2017 2:19 PM     HISTORY: cough x 3 wks        Impression     IMPRESSION:  Negative exam.    CHRISTIANO SANDERS MD                Thank you for choosing [facility]       Thank you for choosing [facility] for your care. Our goal is always to provide you with excellent care. Hearing back from our patients is one way we can continue to improve our services. Please take a few minutes to complete the written survey that you may receive in the mail after you visit with us. Thank you!        MyChart Information     [employer] gives you secure access to your electronic health record. If you see a primary care provider, you can also send messages to your care team and make appointments. If you have questions, please call your primary care clinic.  If you do not have a primary care provider, please call [phone] and they will assist you.        Care EveryWhere ID     This is your Care EveryWhere ID. This could be used by other organizations to access your [facility] medical records  [id number]        Equal Access to Services     VANESSA ORTIZ : Francesco Schaeffer, mandy ghotra, freddy foster, hiram de la paz. So [facility] [phone].    ATENCIÓN: Si habla español, tiene a joaquin disposición servicios gratuitos de asistencia lingüística. Llame al [phone].    We comply with applicable federal civil rights laws and [place] laws. We do not discriminate on the basis of race, color, national origin, age, disability, sex, sexual orientation, or gender identity.            After Visit Summary       This is your record. Keep this with you and show to your community pharmacist(s) and doctor(s) at your next visit.

## 2017-11-05 NOTE — ED PROVIDER NOTES
HPI  Patient is a 52-year-old male presenting with persistent cough.  He was seen on 10/21 for a cough and congestion.  He was diagnosed with bronchitis.  He was given azithromycin and prednisone.  He was seen again on 10/25 for persistent cough.  He was given albuterol.  No chest x-ray had been obtained during these visits.  No prior asthma or COPD history.  He does not smoke.  No other pulmonary history reported.    The patient has had a dry cough for three weeks.  In the morning, the cough is productive of green/brown sputum.  No fever.  No chest pain.  No chest tightness.  His cough comes on in spasms.  He has difficulty breathing while coughing.  Swallowing, talking, laughing, exertion all initiate coughing spasms.  No rhinorrhea or green nasal discharge.  No ear pain.  No sore throat.  No reflux.    ROS: All other review of systems are negative other than that noted above.   PMH: Reviewed.  SH: Reviewed.  FH: Reviewed.      PHYSICAL  BP (!) 151/92  Temp 98  F (36.7  C) (Oral)  Resp 18  Wt 95.7 kg (211 lb)  SpO2 98%  BMI 31.85 kg/m2  General: Patient is alert and in mild distress.  He seems to be holding back cough during our conversation.  Neurological: Alert.  Moving upper and lower extremities equally, bilaterally.  Head / Neck: Atraumatic.  Ears: Not done.  Eyes: Pupils are equal, round, and reactive.  Normal conjunctiva.  Nose: Midline.  No epistaxis.  Mouth / Throat: No ulcerations or lesions.  Upper pharynx is not erythematous.  Moist.  Respiratory: No respiratory distress. CTA B.  Cardiovascular: Regular rhythm.  Peripheral extremities are warm.  No edema.  No calf tenderness.  Abdomen / Pelvis: Not tender.  No distention.  Soft throughout.  Genitalia: Not done.  Musculoskeletal: No tenderness over major muscles and joints.  Skin: No evidence of rash or trauma.        PHYSICIAN  1357.  Three weeks of cough not improving with antibiotic and prednisone.  Chest x-ray ordered.    IMAGING  Images  reviewed by me.  Radiology report also reviewed.  Chest XR,  PA & LAT   Final Result   IMPRESSION: Negative exam.      CHRISTIANO SANDERS MD        9978.  Imaging is unremarkable.  No obvious treatment options to provide symptom relief.  We did discuss Benadryl at night for sedation.  Follow up discussed.  Return for worsening as discussed.      IMPRESSION    ICD-10-CM    1. Post-viral cough syndrome R05            Critical Care time:  none                      Durga Jones MD  11/05/17 3081

## 2017-11-05 NOTE — ED NOTES
Pt presents to ED with complaints of cough for about 3 weeks now. Pt has been seen in UC for this a couple times and diagnosed with bronchitis. Pt reports coughing so hard yesterday his back hurts. Pt denies fevers, no N/V.

## 2017-11-13 ENCOUNTER — HOSPITAL ENCOUNTER (EMERGENCY)
Facility: CLINIC | Age: 52
Discharge: HOME | End: 2017-11-13
Attending: NURSE PRACTITIONER | Admitting: NURSE PRACTITIONER
Payer: COMMERCIAL

## 2017-11-13 VITALS
RESPIRATION RATE: 16 BRPM | TEMPERATURE: 97.9 F | BODY MASS INDEX: 32.15 KG/M2 | WEIGHT: 213 LBS | DIASTOLIC BLOOD PRESSURE: 84 MMHG | SYSTOLIC BLOOD PRESSURE: 157 MMHG | HEART RATE: 60 BPM | OXYGEN SATURATION: 98 %

## 2017-11-13 DIAGNOSIS — M62.830 SPASM OF BACK MUSCLES: ICD-10-CM

## 2017-11-13 DIAGNOSIS — R05.9 COUGH: ICD-10-CM

## 2017-11-13 DIAGNOSIS — M54.50 ACUTE RIGHT-SIDED LOW BACK PAIN WITHOUT SCIATICA: ICD-10-CM

## 2017-11-13 PROCEDURE — 99214 OFFICE O/P EST MOD 30 MIN: CPT | Performed by: NURSE PRACTITIONER

## 2017-11-13 PROCEDURE — 99212 OFFICE O/P EST SF 10 MIN: CPT

## 2017-11-13 RX ORDER — BENZONATATE 200 MG/1
200 CAPSULE ORAL 3 TIMES DAILY PRN
Qty: 21 CAPSULE | Refills: 0 | Status: SHIPPED | OUTPATIENT
Start: 2017-11-13 | End: 2018-10-18

## 2017-11-13 RX ORDER — OXYCODONE AND ACETAMINOPHEN 5; 325 MG/1; MG/1
1-2 TABLET ORAL EVERY 6 HOURS PRN
Qty: 20 TABLET | Refills: 0 | Status: SHIPPED | OUTPATIENT
Start: 2017-11-13 | End: 2018-10-18

## 2017-11-13 NOTE — ED AVS SNAPSHOT
[facility] Emergency Department    [address] [address]    Phone:  [phone]    Fax:  [phone]                                       Micah Lay   MRN: [id number]    Department:  [facility] Emergency Department   Date of Visit:  11/13/2017           Patient Information     Date Of Birth          1965        Your diagnoses for this visit were:     Acute right-sided low back pain without sciatica     Spasm of back muscles        You were seen by Shari Condon APRN CNP.      Follow-up Information     Follow up with Cassius Barriga MD In 1 week.    Specialty:  Family Practice    Why:  if not improved.    Contact information:    [address] [id number] [phone]          Discharge Instructions             Use ibuprofen 400-600 mg up to 4 times per day if needed for pain. Stop if it is causing nausea or abdominal pain.   Add Percocet 5-325 (oxycodone-acetaminophen) 1-2 pills up to every 6 hours if needed for pain. Do not use alcohol, operate machinery, drive, or climb on ladders for 8 hours after taking Percocet. Use docusate (100mg) 2 times a day to prevent constipation while on narcotics.  Tizanidine 1-2 tabs up to three times a day for muscle spasm.   Ice today and tomorrow, may add heat in 2 days.  If not completely improved in 7 days follow-up with your regular doctor.  No work today or tomorrow.  May return to work on 11/15/2017 with restrictions as outlined on the workability form.    *BACK PAIN [acute or chronic]    Back pain is usually caused by an injury to the muscles or ligaments of the spine. Sometimes the disks that separate each bone in the spine may bulge and cause pain by pressing on a nearby nerve. Back pain may also appear after a sudden twisting/bending force (such as in a car accident), after a simple awkward movement, or lifting something heavy with poor body positioning. In either case, muscle spasm is often present and  adds to the pain.  Acute back pain usually gets better in one to two weeks. Back pain related to disk disease, arthritis in the spinal joints or spinal stenosis (narrowing of the spinal canal) can become chronic and last for months or years.  Unless you had a physical injury (for example, a car accident or fall) X-rays are usually not ordered for the initial evaluation of back pain. If pain continues and does not respond to medical treatment, x-rays and other tests may be performed at a later time.  HOME CARE:  1. You should rest as needed. But, as soon as possible, begin sitting or walking to avoid problems with prolonged bed rest (muscle weakness, worsening back stiffness and pain, blood clots in the legs).  2. When in bed, try to find a position of comfort. A firm mattress is best. Try lying flat on your back with pillows under your knees. You can also try lying on your side with your knees bent up towards your chest and a pillow between your knees.  3. Avoid prolonged sitting. This puts more stress on the lower back than standing or walking.  4. During the first two days after injury, apply an ICE PACK to the painful area for 20 minutes every 2-4 hours. This will reduce swelling and pain. HEAT (hot shower, hot bath or heating pad) works well for muscle spasm. You can start with ice, then switch to heat after two days. Some patients feel best alternating ice and heat treatments. Use the one method that feels the best to you.  5. You may use acetaminophen (Tylenol) 650-1000 mg every 6 hours or ibuprofen (Motrin, Advil) 600 mg every 6-8 hours with food to control pain, if you are able to take these medicines. [NOTE: If you have chronic liver or kidney disease or ever had a stomach ulcer or GI bleeding, talk with your doctor before using these medicines.]  6. Be aware of safe lifting methods and do not lift anything over 15 pounds until all the pain is gone.  FOLLOW UP with your doctor if your symptoms do not start  to improve after one week. Your doctor may consider using physical therapy to help your recover.   GET PROMPT MEDICAL ATTENTION if any of the following occur:    Pain becomes worse or spreads to your legs    Weakness or numbness in one or both legs    Loss of bowel or bladder control    Numbness in the groin or genital area    9663-6021 [employer], [address] [place] [address], [place] [address]. All rights reserved. This information is not intended as a substitute for professional medical care. Always follow your healthcare professional's instructions.      24 Hour Appointment Hotline       To make an appointment at any [facility]], call [phone] ([phone]). If you don't have a family doctor or clinic, we will help you find one. [facility] clinics are conveniently located to serve the needs of you and your family.             Review of your medicines      START taking        Dose / Directions Last dose taken    oxyCODONE-acetaminophen 5-325 MG per tablet   Commonly known as:  PERCOCET   Dose:  1-2 tablet   Quantity:  20 tablet        Take 1-2 tablets by mouth every 6 hours as needed for moderate to severe pain   Refills:  0        tiZANidine 4 MG tablet   Commonly known as:  ZANAFLEX   Dose:  4-8 mg   Quantity:  21 tablet        Take 1-2 tablets (4-8 mg) by mouth 3 times daily as needed for muscle spasms   Refills:  0          Our records show that you are taking the medicines listed below. If these are incorrect, please call your family doctor or clinic.        Dose / Directions Last dose taken    albuterol 108 (90 BASE) MCG/ACT Inhaler   Commonly known as:  PROAIR HFA/PROVENTIL HFA/VENTOLIN HFA   Dose:  2 puff   Quantity:  1 Inhaler        Inhale 2 puffs into the lungs every 6 hours as needed for shortness of breath / dyspnea or wheezing   Refills:  0        * levothyroxine 150 MCG tablet   Commonly known as:  SYNTHROID/LEVOTHROID   Quantity:  30 tablet        TAKE 2 TABLETS ONCE DAILY FOR TOTAL  DAILY DOSE OF 300MCG.   Refills:  0        * levothyroxine 150 MCG tablet   Commonly known as:  SYNTHROID/LEVOTHROID   Dose:  300 mcg   Quantity:  180 tablet        Take 2 tablets (300 mcg) by mouth daily   Refills:  3        MELATONIN TR 10 MG Tbcr   Dose:  1 tablet   Generic drug:  Melatonin        Take 1 tablet by mouth At Bedtime   Refills:  0        oxybutynin 10 MG 24 hr tablet   Commonly known as:  DITROPAN XL   Dose:  10 mg   Quantity:  30 tablet        Take 1 tablet (10 mg) by mouth daily   Refills:  11        pravastatin 40 MG tablet   Commonly known as:  PRAVACHOL   Dose:  40 mg   Quantity:  90 tablet        Take 1 tablet (40 mg) by mouth daily   Refills:  3        * Notice:  This list has 2 medication(s) that are the same as other medications prescribed for you. Read the directions carefully, and ask your doctor or other care provider to review them with you.            Prescriptions were sent or printed at these locations (2 Prescriptions)                   [facility] [place] [address] [facility]   [address] [address]    Telephone:  [phone]   Fax:  [phone]   Hours:                  E-Prescribed (1 of 2)         tiZANidine (ZANAFLEX) 4 MG tablet                 Printed at Department/Unit printer (1 of 2)         oxyCODONE-acetaminophen (PERCOCET) 5-325 MG per tablet                Orders Needing Specimen Collection     None      Pending Results     No orders found from 11/11/2017 to 11/14/2017.            Pending Culture Results     No orders found from 11/11/2017 to 11/14/2017.            Pending Results Instructions     If you had any lab results that were not finalized at the time of your Discharge, you can call the ED Lab Result RN at [phone]. You will be contacted by this team for any positive Lab results or changes in treatment. The nurses are available 7 days a week from 10A to 6:30P.  You can leave a message 24 hours per day and they will  return your call.        Test Results From Your Hospital Stay               Thank you for choosing [facility]       Thank you for choosing [facility] for your care. Our goal is always to provide you with excellent care. Hearing back from our patients is one way we can continue to improve our services. Please take a few minutes to complete the written survey that you may receive in the mail after you visit with us. Thank you!        [email or web address] Information     [employer] gives you secure access to your electronic health record. If you see a primary care provider, you can also send messages to your care team and make appointments. If you have questions, please call your primary care clinic.  If you do not have a primary care provider, please call [phone] and they will assist you.        Care EveryWhere ID     This is your Care EveryWhere ID. This could be used by other organizations to access your [facility] medical records  [id number]        Equal Access to Services     VANESSA ORTIZ : Francesco Schaeffer, mandy ghotra, hiram killian. So [facility] [phone].    ATENCIÓN: Si habla español, tiene a joaquin disposición servicios gratuitos de asistencia lingüística. [place] al [phone].    We comply with applicable federal civil rights laws and [place] laws. We do not discriminate on the basis of race, color, national origin, age, disability, sex, sexual orientation, or gender identity.            After Visit Summary       This is your record. Keep this with you and show to your community pharmacist(s) and doctor(s) at your next visit.

## 2017-11-13 NOTE — ED AVS SNAPSHOT
[facility] Emergency Department    [address] [address]    Phone:  [phone]    Fax:  [phone]                                       Micah Lay   MRN: [id number]    Department:  [facility] Emergency Department   Date of Visit:  11/13/2017           After Visit Summary Signature Page     I have received my discharge instructions, and my questions have been answered. I have discussed any challenges I see with this plan with the nurse or doctor.    ..........................................................................................................................................  Patient/Patient Representative Signature      ..........................................................................................................................................  Patient Representative Print Name and Relationship to Patient    ..................................................               ................................................  Date                                            Time    ..........................................................................................................................................  Reviewed by Signature/Title    ...................................................              ..............................................  Date                                                            Time

## 2017-11-13 NOTE — DISCHARGE INSTRUCTIONS
Use ibuprofen 400-600 mg up to 4 times per day if needed for pain. Stop if it is causing nausea or abdominal pain.   Add Percocet 5-325 (oxycodone-acetaminophen) 1-2 pills up to every 6 hours if needed for pain. Do not use alcohol, operate machinery, drive, or climb on ladders for 8 hours after taking Percocet. Use docusate (100mg) 2 times a day to prevent constipation while on narcotics.  Tizanidine 1-2 tabs up to three times a day for muscle spasm.   Ice today and tomorrow, may add heat in 2 days.  If not completely improved in 7 days follow-up with your regular doctor.  No work today or tomorrow.  May return to work on 11/15/2017 with restrictions as outlined on the workability form.    *BACK PAIN [acute or chronic]    Back pain is usually caused by an injury to the muscles or ligaments of the spine. Sometimes the disks that separate each bone in the spine may bulge and cause pain by pressing on a nearby nerve. Back pain may also appear after a sudden twisting/bending force (such as in a car accident), after a simple awkward movement, or lifting something heavy with poor body positioning. In either case, muscle spasm is often present and adds to the pain.  Acute back pain usually gets better in one to two weeks. Back pain related to disk disease, arthritis in the spinal joints or spinal stenosis (narrowing of the spinal canal) can become chronic and last for months or years.  Unless you had a physical injury (for example, a car accident or fall) X-rays are usually not ordered for the initial evaluation of back pain. If pain continues and does not respond to medical treatment, x-rays and other tests may be performed at a later time.  HOME CARE:  1. You should rest as needed. But, as soon as possible, begin sitting or walking to avoid problems with prolonged bed rest (muscle weakness, worsening back stiffness and pain, blood clots in the legs).  2. When in bed, try to find a position of comfort. A firm  mattress is best. Try lying flat on your back with pillows under your knees. You can also try lying on your side with your knees bent up towards your chest and a pillow between your knees.  3. Avoid prolonged sitting. This puts more stress on the lower back than standing or walking.  4. During the first two days after injury, apply an ICE PACK to the painful area for 20 minutes every 2-4 hours. This will reduce swelling and pain. HEAT (hot shower, hot bath or heating pad) works well for muscle spasm. You can start with ice, then switch to heat after two days. Some patients feel best alternating ice and heat treatments. Use the one method that feels the best to you.  5. You may use acetaminophen (Tylenol) 650-1000 mg every 6 hours or ibuprofen (Motrin, Advil) 600 mg every 6-8 hours with food to control pain, if you are able to take these medicines. [NOTE: If you have chronic liver or kidney disease or ever had a stomach ulcer or GI bleeding, talk with your doctor before using these medicines.]  6. Be aware of safe lifting methods and do not lift anything over 15 pounds until all the pain is gone.  FOLLOW UP with your doctor if your symptoms do not start to improve after one week. Your doctor may consider using physical therapy to help your recover.   GET PROMPT MEDICAL ATTENTION if any of the following occur:    Pain becomes worse or spreads to your legs    Weakness or numbness in one or both legs    Loss of bowel or bladder control    Numbness in the groin or genital area    5346-4612 [employer], [address] [place] [address], [place] [address]. All rights reserved. This information is not intended as a substitute for professional medical care. Always follow your healthcare professional's instructions.

## 2017-11-13 NOTE — ED PROVIDER NOTES
"  History     Chief Complaint   Patient presents with     Back Pain     hx of back problems, and \" threw my back out\" Low right sided back pain. No numbness or tingling in arms or legs.      HPI  Micah Lay is a 52 year old male who presents to urgent care for evaluation of low back pain. Pain started this morning shortly after getting out of bed at 4:45am to go to the bathroom. Patient was walking to bathroom and started to cough and then suddenly had severe pain in his low back. More on the right side than the left. Pain severe and sharp. Increased pain with twisting and small movements cause spasm of the low back. Patient laid down and pain did not resolve.  Difficult to get out of bed.  Denies history of back surgery.  Occasional low back pain that he seeks treatment with chiropractor for adjustments that is helpful. Reports not having this level of back pain for few years.     Additionally, patient has a persistent dry cough for the last 6 weeks. Evaluated three times for this, treated with Azithromycin, prednisone, and albuterol. No improvement of symptoms. Denies fever or chills. Believes the cough is what caused him to have the back pain.    Problem List:    Patient Active Problem List    Diagnosis Date Noted     Bladder spasms 09/15/2016     Priority: Medium     Hypothyroidism, unspecified type 05/25/2016     Priority: Medium     HYPERLIPIDEMIA LDL GOAL <130 10/31/2010     Priority: Medium     Family history of colon cancer 08/23/2010     Priority: Medium     Family history of aneurysm 08/23/2010     Priority: Medium     Screening for hypertension 11/03/2008     Priority: Medium        Past Medical History:    Past Medical History:   Diagnosis Date     Other and unspecified hyperlipidemia may 2006     Sprain of lumbar region      Unspecified hypothyroidism        Past Surgical History:    History reviewed. No pertinent surgical history.    Family History:    Family History   Problem Relation Age of " Onset     Arthritis Mother      Thyroid Disease Mother      Lipids Mother      Hypertension Father      Allergies Son      Neurologic Disorder Son      taking concerta     Connective Tissue Disorder Maternal Grandfather      Hypertension Brother      Obesity Brother      Lipids Brother      Cardiovascular Brother      luan       Social History:  Marital Status:   [2]  Social History   Substance Use Topics     Smoking status: Never Smoker     Smokeless tobacco: Never Used     Alcohol use No        Medications:      oxyCODONE-acetaminophen (PERCOCET) 5-325 MG per tablet   tiZANidine (ZANAFLEX) 4 MG tablet   benzonatate (TESSALON) 200 MG capsule   albuterol (PROAIR HFA/PROVENTIL HFA/VENTOLIN HFA) 108 (90 BASE) MCG/ACT Inhaler   oxybutynin (DITROPAN XL) 10 MG 24 hr tablet   pravastatin (PRAVACHOL) 40 MG tablet   levothyroxine (SYNTHROID/LEVOTHROID) 150 MCG tablet   levothyroxine (SYNTHROID/LEVOTHROID) 150 MCG tablet   Melatonin (MELATONIN TR) 10 MG TBCR         Review of Systems  As mentioned above in the history present illness. All other systems were reviewed and are negative.    Physical Exam   BP: 157/84  Pulse: 60  Temp: 97.9  F (36.6  C)  Resp: 16  Weight: 96.6 kg (213 lb)  SpO2: 98 %      Physical Exam  Appearance: Pt is afebrile.  No midline back tenderness on exam.  There is diffuse lumbar paraspinal muscle tenderness to palpation, more on right side.  No evidence of cauda equina.  Denies saddle anesthesia, bowel or bladder incontinence, lower extremity numbness/tingling/weakness.  Normal lower extremity strength.  Gait is steady.  No indication for emergent MRI imaging today as pt has no new trauma or neurologic symptoms or objective findings of weakness or signs of cauda equina on exam.    ED Course     ED Course     Procedures               Labs Ordered and Resulted from Time of ED Arrival Up to the Time of Departure from the ED - No data to display    Assessments & Plan (with Medical Decision  Making)   DDx: acute muscle strain, muscle spasm, herniated disc, cauda equina, spinal fracture, spinal stenosis, sciatica, degenerative disease, ligamentous injury, spondylolisthesis, epidural abscess, osteomyelitis, AAA, abdominal etiologies     Clinical exam consistent with low back strain and spasm.  Instructed as follows:  Use ibuprofen 400-600 mg up to 4 times per day if needed for pain. Stop if it is causing nausea or abdominal pain.   Add Percocet 5-325 (oxycodone-acetaminophen) 1-2 pills up to every 6 hours if needed for pain. Do not use alcohol, operate machinery, drive, or climb on ladders for 8 hours after taking Percocet. Use docusate (100mg) 2 times a day to prevent constipation while on narcotics.  Tizanidine 1-2 tabs up to three times a day for muscle spasm.   Ice today and tomorrow, may add heat in 2 days.  If not completely improved in 7 days follow-up with your regular doctor.  No work today or tomorrow.  May return to work on 11/15/2017 with restrictions as outlined on the workability form.    Discussed prolonged post-virus cough and chronic cough syndrome. Will try tessalon for cough. Recommend follow-up with PCP if cough persists longer than 8 weeks.     I have reviewed the nursing notes.    I have reviewed the findings, diagnosis, plan and need for follow up with the patient.      Discharge Medication List as of 11/13/2017  2:59 PM      START taking these medications    Details   oxyCODONE-acetaminophen (PERCOCET) 5-325 MG per tablet Take 1-2 tablets by mouth every 6 hours as needed for moderate to severe pain, Disp-20 tablet, R-0, Local Print      tiZANidine (ZANAFLEX) 4 MG tablet Take 1-2 tablets (4-8 mg) by mouth 3 times daily as needed for muscle spasms, Disp-21 tablet, R-0, E-Prescribe             Final diagnoses:   Acute right-sided low back pain without sciatica   Spasm of back muscles   Cough       11/13/2017   [facility] EMERGENCY DEPARTMENT     Roseanna, FLORENCE Velazquez  CNP  11/13/17 0819

## 2017-11-14 NOTE — ED NOTES
3:48 PM  Spoke with patient via phone. Patient states he does not think he is able to return to work tomorrow, back is too sore. Patient would like workability form to extend time off of work. This is reasonable in the setting of his acute back pain for 2 days.  I agreed to extend the dates off work from 11/13/2017 - 11/16/2017.  May return to work on 11/17/2017 with restrictions through 11/24/2017. (see new workability form which was faxed to his employer).    FLORENCE Roach CNP, FLORENCE Velazquez CNP  11/14/17 9216

## 2018-05-14 ENCOUNTER — TELEPHONE (OUTPATIENT)
Dept: FAMILY MEDICINE | Facility: CLINIC | Age: 53
End: 2018-05-14

## 2018-05-14 NOTE — TELEPHONE ENCOUNTER
5/14/2018      Patient is aware that they are due for their preventative health screenings and will schedule on their own time.                   Outreach ,  Chaya Rutledge

## 2018-09-30 DIAGNOSIS — E03.9 HYPOTHYROIDISM, UNSPECIFIED TYPE: ICD-10-CM

## 2018-09-30 NOTE — LETTER
[facility]  [address] [address]  Phone: [phone]       October 1, 2018         Micah Lay  [address] [facility] [address]            Dear Micah:    We are concerned about your health care.  We recently provided you with medication refills.  Many medications require routine follow-up with your doctor.    At this time you are due for a clinic visit and routine lab work.    Your prescription(s) have been refilled for 30 days so you may have time for the above noted follow-up. Please call to schedule soon so we can assure you have an appointment before your next refills are needed.    Thank you,      Nadir Flaherty MD / KIA[facility]

## 2018-10-01 RX ORDER — LEVOTHYROXINE SODIUM 150 UG/1
300 TABLET ORAL DAILY
Qty: 60 TABLET | Refills: 0 | Status: SHIPPED | OUTPATIENT
Start: 2018-10-01 | End: 2018-10-18

## 2018-10-01 NOTE — TELEPHONE ENCOUNTER
"Requested Prescriptions   Pending Prescriptions Disp Refills     levothyroxine (SYNTHROID/LEVOTHROID) 150 MCG tablet [Pharmacy Med Name: LEVOTHYROXINE 150 MCG TABLET]  Last Written Prescription Date:  09/11/17  Last Fill Quantity: 180,  # refills: 3   Last office visit: 9/11/2017 with prescribing provider:  09/11/17   Future Office Visit:     180 tablet 3     Sig: TAKE 2 TABLETS (300 MCG) BY MOUTH DAILY    Thyroid Protocol Failed    9/30/2018  2:14 AM       Failed - Recent (12 mo) or future (30 days) visit within the authorizing provider's specialty    Patient had office visit in the last 12 months or has a visit in the next 30 days with authorizing provider or within the authorizing provider's specialty.  See \"Patient Info\" tab in inbasket, or \"Choose Columns\" in Meds & Orders section of the refill encounter.           Failed - Normal TSH on file in past 12 months    Recent Labs   Lab Test  09/11/17   1112   TSH  1.53          Passed - Patient is 12 years or older          "

## 2018-10-01 NOTE — TELEPHONE ENCOUNTER
Due for clinic visit and lab-ordered. 30 day pj fill given. Appointment reminder letter sent.     Anna SAENZ RN

## 2018-10-08 DIAGNOSIS — N32.89 BLADDER SPASMS: ICD-10-CM

## 2018-10-08 RX ORDER — OXYBUTYNIN CHLORIDE 10 MG/1
TABLET, EXTENDED RELEASE ORAL
Qty: 30 TABLET | Refills: 0 | Status: SHIPPED | OUTPATIENT
Start: 2018-10-08 | End: 2018-11-03

## 2018-10-08 NOTE — TELEPHONE ENCOUNTER
"Requested Prescriptions   Pending Prescriptions Disp Refills     oxybutynin (DITROPAN-XL) 10 MG 24 hr tablet [Pharmacy Med Name: OXYBUTYNIN CL ER 10 MG TABLET] 30 tablet 9     Sig: TAKE 1 TABLET (10 MG) BY MOUTH DAILY    Muscarinic Antagonists (Urinary Incontinence Agents) Passed    10/8/2018  2:17 AM       Passed - Recent (12 mo) or future (30 days) visit within the authorizing provider's specialty    Patient had office visit in the last 12 months or has a visit in the next 30 days with authorizing provider or within the authorizing provider's specialty.  See \"Patient Info\" tab in inbasket, or \"Choose Columns\" in Meds & Orders section of the refill encounter.           Passed - Medication is Oxybutynin and patient is 5 years of age or older       Passed - Patient does not have a diagnosis of glaucoma on the problem list    If glaucoma diagnosis is new, refer refill to physician.         Passed - Patient is 18 years of age or older        Last Written Prescription Date:  9/28/17  Last Fill Quantity: 30,  # refills: 11   Last office visit: 9/11/2017 with prescribing provider:     Future Office Visit:   Next 5 appointments (look out 90 days)     Oct 18, 2018  3:40 PM CDT   PHYSICAL with Cassius Barriga MD   [facility]    [address] [address] [phone]               Medication is being filled for 1 time refill only due to:  Patient needs to be seen because it has been more than one year since last visit.      "

## 2018-10-18 ENCOUNTER — OFFICE VISIT (OUTPATIENT)
Dept: FAMILY MEDICINE | Facility: CLINIC | Age: 53
End: 2018-10-18
Payer: COMMERCIAL

## 2018-10-18 VITALS
BODY MASS INDEX: 32.43 KG/M2 | HEIGHT: 68 IN | SYSTOLIC BLOOD PRESSURE: 128 MMHG | HEART RATE: 79 BPM | WEIGHT: 214 LBS | OXYGEN SATURATION: 96 % | DIASTOLIC BLOOD PRESSURE: 86 MMHG | TEMPERATURE: 97.7 F

## 2018-10-18 DIAGNOSIS — Z00.00 ROUTINE HISTORY AND PHYSICAL EXAMINATION OF ADULT: Primary | ICD-10-CM

## 2018-10-18 DIAGNOSIS — E78.5 HYPERLIPIDEMIA LDL GOAL <130: ICD-10-CM

## 2018-10-18 DIAGNOSIS — E03.9 HYPOTHYROIDISM, UNSPECIFIED TYPE: ICD-10-CM

## 2018-10-18 DIAGNOSIS — K40.90 NON-RECURRENT UNILATERAL INGUINAL HERNIA WITHOUT OBSTRUCTION OR GANGRENE: ICD-10-CM

## 2018-10-18 PROCEDURE — 99213 OFFICE O/P EST LOW 20 MIN: CPT | Mod: 25 | Performed by: FAMILY MEDICINE

## 2018-10-18 PROCEDURE — 99396 PREV VISIT EST AGE 40-64: CPT | Performed by: FAMILY MEDICINE

## 2018-10-18 RX ORDER — LEVOTHYROXINE SODIUM 150 UG/1
300 TABLET ORAL DAILY
Qty: 180 TABLET | Refills: 3 | Status: SHIPPED | OUTPATIENT
Start: 2018-10-18 | End: 2019-11-25

## 2018-10-18 RX ORDER — PRAVASTATIN SODIUM 40 MG
40 TABLET ORAL DAILY
Qty: 90 TABLET | Refills: 3 | Status: SHIPPED | OUTPATIENT
Start: 2018-10-18 | End: 2018-11-02

## 2018-10-18 NOTE — MR AVS SNAPSHOT
After Visit Summary   10/18/2018    Micah Lay    MRN: [id number]           Patient Information     Date Of Birth          1965        Visit Information        Provider Department      10/18/2018 3:40 PM Cassius Barriga MD [facility]        Today's Diagnoses     Routine history and physical examination of adult    -  1    Hypothyroidism, unspecified type        Hyperlipidemia LDL goal <130        Non-recurrent unilateral inguinal hernia without obstruction or gangrene          Care Instructions      Preventive Health Recommendations  Male Ages 50 - 64    Yearly exam:             See your health care provider every year in order to  o   Review health changes.   o   Discuss preventive care.    o   Review your medicines if your doctor has prescribed any.     Have a cholesterol test every 5 years, or more frequently if you are at risk for high cholesterol/heart disease.     Have a diabetes test (fasting glucose) every three years. If you are at risk for diabetes, you should have this test more often.     Have a colonoscopy at age 50, or have a yearly FIT test (stool test). These exams will check for colon cancer.      Talk with your health care provider about whether or not a prostate cancer screening test (PSA) is right for you.    You should be tested each year for STDs (sexually transmitted diseases), if you re at risk.     Shots: Get a flu shot each year. Get a tetanus shot every 10 years.     Nutrition:    Eat at least 5 servings of fruits and vegetables daily.     Eat whole-grain bread, whole-wheat pasta and brown rice instead of white grains and rice.     Get adequate Calcium and Vitamin D.     Lifestyle    Exercise for at least 150 minutes a week (30 minutes a day, 5 days a week). This will help you control your weight and prevent disease.     Limit alcohol to one drink per day.     No smoking.     Wear sunscreen to prevent skin cancer.     See your dentist every six  "months for an exam and cleaning.     See your eye doctor every 1 to 2 years.            Thank you for choosing [facility].  You may be receiving a survey in the mail from Sari Mace regarding your visit today.  Please take a few minutes to complete and return the survey to let us know how we are doing.      If you have questions or concerns, please contact us via Alere or you can contact your care team at [phone].    Our Clinic hours are:  Monday 6:40 am  to 7:00 pm  Tuesday -Friday 6:40 am to 5:00 pm    The [place] outpatient lab hours are:  Monday - Friday 6:10 am to 4:45 pm  Saturdays 7:00 am to 11:00 am  Appointments are required, call [phone]    If you have clinical questions after hours or would like to schedule an appointment,  call the clinic at [phone].          ASSESSMENT/PLAN:   1. Routine history and physical examination of adult  - GASTROENTEROLOGY ADULT REF PROCEDURE ONLY [facility] [phone]  COUNSELING:  Reviewed preventive health counseling, as reflected in patient instructions       Regular exercise       Healthy diet/nutrition       Vision screening       Hearing screening    BP Readings from Last 1 Encounters:   10/18/18 128/86     Estimated body mass index is 32.54 kg/(m^2) as calculated from the following:    Height as of this encounter: 5' 8\" (1.727 m).    Weight as of this encounter: 214 lb (97.1 kg).     reports that he has never smoked. He has never used smokeless tobacco.  Counseling Resources:  ATP IV Guidelines  Pooled Cohorts Equation Calculator  FRAX Risk Assessment  ICSI Preventive Guidelines  Dietary Guidelines for Americans, 2010  USDA's MyPlate  ASA Prophylaxis  Lung CA Screening    2. Hypothyroidism, unspecified type  Use the med and refills done for one year. Do the labs soon and annually.   - TSH; Future  - T4 FREE; Future  - levothyroxine (SYNTHROID/LEVOTHROID) 150 MCG tablet; Take 2 tablets (300 mcg) by mouth daily  Dispense: 180 tablet; " Refill: 3    3. Hyperlipidemia LDL goal <130  Use the lower chol diet and daily exercise. The goal for the LDL is under 130.   - ALT; Future  - Lipid panel reflex to direct LDL Fasting; Future  - ALT; Future  - Lipid panel reflex to direct LDL Fasting; Future  - pravastatin (PRAVACHOL) 40 MG tablet; Take 1 tablet (40 mg) by mouth daily  Dispense: 90 tablet; Refill: 3    4. Non-recurrent unilateral inguinal hernia without obstruction or gangrene  Monitor for the incarceration. Call for the surgery referral at [phone] in [facility]  - GENERAL SURG ADULT REFERRAL            Follow-ups after your visit        Additional Services     GASTROENTEROLOGY ADULT REF PROCEDURE ONLY [facility] [phone]       Last Lab Result: Creatinine (mg/dL)       Date                     Value                 06/28/2014               0.91             ----------  Body mass index is 32.54 kg/(m^2).     Needed:  No  Language:  English    Patient will be contacted to schedule procedure.     Please be aware that coverage of these services is subject to the terms and limitations of your health insurance plan.  Call member services at your health plan with any benefit or coverage questions.  Any procedures must be performed at a [place] facility OR coordinated by your clinic's referral office.    Please bring the following with you to your appointment:    (1) Any X-Rays, CTs or MRIs which have been performed.  Contact the facility where they were done to arrange for  prior to your scheduled appointment.    (2) List of current medications   (3) This referral request   (4) Any documents/labs given to you for this referral            GENERAL SURG ADULT REFERRAL       Your provider has referred you to: [facility] [place] [phone]   [email or web address]    Please be aware that coverage of these services is subject to the terms and limitations of your health insurance plan.   Call member services at your health plan with any benefit or coverage questions.      Please bring the following with you to your appointment:    (1) Any X-Rays, CTs or MRIs which have been performed.  Contact the facility where they were done to arrange for  prior to your scheduled appointment.   (2) List of current medications   (3) This referral request   (4) Any documents/labs given to you for this referral                  Future tests that were ordered for you today     Open Future Orders        Priority Expected Expires Ordered    TSH Routine 8/18/2019 10/18/2019 10/18/2018    T4 FREE Routine 8/18/2019 10/18/2019 10/18/2018    ALT Routine 8/18/2019 10/18/2019 10/18/2018    Lipid panel reflex to direct LDL Fasting Routine 8/18/2019 10/18/2019 10/18/2018    TSH Routine 10/19/2018 12/18/2018 10/18/2018    T4 FREE Routine 10/19/2018 12/18/2018 10/18/2018    ALT Routine 10/19/2018 12/18/2018 10/18/2018    Lipid panel reflex to direct LDL Fasting Routine 10/19/2018 12/18/2018 10/18/2018            Who to contact     If you have questions or need follow up information about today's clinic visit or your schedule please contact [facility] directly at [phone].  Normal or non-critical lab and imaging results will be communicated to you by [email or web address], letter or phone within 4 business days after the clinic has received the results. If you do not hear from us within 7 days, please contact the clinic through [email or web address] or phone. If you have a critical or abnormal lab result, we will notify you by phone as soon as possible.  Submit refill requests through Enablon or call your pharmacy and they will forward the refill request to us. Please allow 3 business days for your refill to be completed.          Additional Information About Your Visit        Enablon Information     En[employer]on gives you secure access to your electronic health record. If you see a primary care provider, you can also send messages to  "your care team and make appointments. If you have questions, please call your primary care clinic.  If you do not have a primary care provider, please call [phone] and they will assist you.        Care EveryWhere ID     This is your Care EveryWhere ID. This could be used by other organizations to access your [place] medical records  [id number]        Your Vitals Were     Pulse Temperature Height Pulse Oximetry BMI (Body Mass Index)       79 97.7  F (36.5  C) (Tympanic) 5' 8\" (1.727 m) 96% 32.54 kg/m2        Blood Pressure from Last 3 Encounters:   10/18/18 128/86   11/13/17 157/84   11/05/17 (!) 151/92    Weight from Last 3 Encounters:   10/18/18 214 lb (97.1 kg)   11/13/17 213 lb (96.6 kg)   11/05/17 211 lb (95.7 kg)              We Performed the Following     GASTROENTEROLOGY ADULT REF PROCEDURE ONLY [facility] [phone]     GENERAL SURG ADULT REFERRAL          Where to get your medicines      These medications were sent to [place] [address] [place] [address] [address]     Phone:  [phone]     levothyroxine 150 MCG tablet    pravastatin 40 MG tablet          Primary Care Provider Office Phone # Fax #    Cassius Barriga [phone] [address] [facility] [address]        Equal Access to Services     VANESSA ORTIZ AH: Hadii erik ku hadasho Soomaali, waaxda luqadaha, qaybta kaalmada adezaid, hiram de la paz. So [facility] [phone].    ATENCIÓN: Si habla español, tiene a joaquin disposición servicios gratuitos de asistencia lingüística. Llame al [phone].    We comply with applicable federal civil rights laws and [place] laws. We do not discriminate on the basis of race, color, national origin, age, disability, sex, sexual orientation, or gender identity.            Thank you!     Thank you for choosing [facility]  for your care. Our goal is always to provide you with " excellent care. Hearing back from our patients is one way we can continue to improve our services. Please take a few minutes to complete the written survey that you may receive in the mail after your visit with us. Thank you!             Your Updated Medication List - Protect others around you: Learn how to safely use, store and throw away your medicines at www.disposemymeds.org.          This list is accurate as of 10/18/18  4:38 PM.  Always use your most recent med list.                   Brand Name Dispense Instructions for use Diagnosis    levothyroxine 150 MCG tablet    SYNTHROID/LEVOTHROID    180 tablet    Take 2 tablets (300 mcg) by mouth daily    Hypothyroidism, unspecified type       MELATONIN TR 10 MG Tbcr   Generic drug:  Melatonin      Take 1 tablet by mouth At Bedtime        oxybutynin 10 MG 24 hr tablet    DITROPAN-XL    30 tablet    TAKE 1 TABLET (10 MG) BY MOUTH DAILY    Bladder spasms       pravastatin 40 MG tablet    PRAVACHOL    90 tablet    Take 1 tablet (40 mg) by mouth daily    Hyperlipidemia LDL goal <130

## 2018-10-18 NOTE — PATIENT INSTRUCTIONS
Preventive Health Recommendations  Male Ages 50 - 64    Yearly exam:             See your health care provider every year in order to  o   Review health changes.   o   Discuss preventive care.    o   Review your medicines if your doctor has prescribed any.     Have a cholesterol test every 5 years, or more frequently if you are at risk for high cholesterol/heart disease.     Have a diabetes test (fasting glucose) every three years. If you are at risk for diabetes, you should have this test more often.     Have a colonoscopy at age 50, or have a yearly FIT test (stool test). These exams will check for colon cancer.      Talk with your health care provider about whether or not a prostate cancer screening test (PSA) is right for you.    You should be tested each year for STDs (sexually transmitted diseases), if you re at risk.     Shots: Get a flu shot each year. Get a tetanus shot every 10 years.     Nutrition:    Eat at least 5 servings of fruits and vegetables daily.     Eat whole-grain bread, whole-wheat pasta and brown rice instead of white grains and rice.     Get adequate Calcium and Vitamin D.     Lifestyle    Exercise for at least 150 minutes a week (30 minutes a day, 5 days a week). This will help you control your weight and prevent disease.     Limit alcohol to one drink per day.     No smoking.     Wear sunscreen to prevent skin cancer.     See your dentist every six months for an exam and cleaning.     See your eye doctor every 1 to 2 years.            Thank you for choosing [facility].  You may be receiving a survey in the mail from Sari Mace regarding your visit today.  Please take a few minutes to complete and return the survey to let us know how we are doing.      If you have questions or concerns, please contact us via [facility] or you can contact your care team at [phone].    Our Clinic hours are:  Monday 6:40 am  to 7:00 pm  Tuesday -Friday 6:40 am to 5:00 pm    [facility] [place]  "lab hours are:  Monday - Friday 6:10 am to 4:45 pm  Saturdays 7:00 am to 11:00 am  Appointments are required, call [phone]    If you have clinical questions after hours or would like to schedule an appointment,  call the clinic at [phone].          ASSESSMENT/PLAN:   1. Routine history and physical examination of adult  - GASTROENTEROLOGY ADULT REF PROCEDURE ONLY [facility] [place] [phone]  COUNSELING:  Reviewed preventive health counseling, as reflected in patient instructions       Regular exercise       Healthy diet/nutrition       Vision screening       Hearing screening    BP Readings from Last 1 Encounters:   10/18/18 128/86     Estimated body mass index is 32.54 kg/(m^2) as calculated from the following:    Height as of this encounter: 5' 8\" (1.727 m).    Weight as of this encounter: 214 lb (97.1 kg).     reports that he has never smoked. He has never used smokeless tobacco.  Counseling Resources:  ATP IV Guidelines  Pooled Cohorts Equation Calculator  FRAX Risk Assessment  ICSI Preventive Guidelines  Dietary Guidelines for Americans, 2010  USDA's MyPlate  ASA Prophylaxis  Lung CA Screening    2. Hypothyroidism, unspecified type  Use the med and refills done for one year. Do the labs soon and annually.   - TSH; Future  - T4 FREE; Future  - levothyroxine (SYNTHROID/LEVOTHROID) 150 MCG tablet; Take 2 tablets (300 mcg) by mouth daily  Dispense: 180 tablet; Refill: 3    3. Hyperlipidemia LDL goal <130  Use the lower chol diet and daily exercise. The goal for the LDL is under 130.   - ALT; Future  - Lipid panel reflex to direct LDL Fasting; Future  - ALT; Future  - Lipid panel reflex to direct LDL Fasting; Future  - pravastatin (PRAVACHOL) 40 MG tablet; Take 1 tablet (40 mg) by mouth daily  Dispense: 90 tablet; Refill: 3    4. Non-recurrent unilateral inguinal hernia without obstruction or gangrene  Monitor for the incarceration. Call for the surgery referral at [phone] in [facility]  - GENERAL SURG " ADULT REFERRAL

## 2018-10-18 NOTE — PROGRESS NOTES
SUBJECTIVE:   CC: Micah Lay is an 53 year old male who presents for preventative health visit.     Healthy Habits:    Do you get at least three servings of calcium containing foods daily (dairy, green leafy vegetables, etc.)? yes    Amount of exercise or daily activities, outside of work: None    Problems taking medications regularly No    Medication side effects: hamstrings feel tight often.  He feels this may be due to not drinking enough water.    Have you had an eye exam in the past two years? No,  Has noticed changes with his vision. He is making an appt.     Do you see a dentist twice per year? yes    Do you have sleep apnea, excessive snoring or daytime drowsiness? no       Chief Complaint   Patient presents with     Physical     General physical exam.     Lipids     Refill of lipid medication.  He is not fasting today.     Bladder Problems     Refill of medications.     Thyroid Problem     Recheck on thyroid medication.     Imm/Inj     He will make a future [facility] visit for the tetanus injection.     Hernia     He states he was told he had a hernia at his last physical.  Has not had any futher treatment done for that.       Current Outpatient Prescriptions:      levothyroxine (SYNTHROID/LEVOTHROID) 150 MCG tablet, Take 2 tablets (300 mcg) by mouth daily, Disp: 60 tablet, Rfl: 0     Melatonin (MELATONIN TR) 10 MG TBCR, Take 1 tablet by mouth At Bedtime, Disp: , Rfl:      oxybutynin (DITROPAN-XL) 10 MG 24 hr tablet, TAKE 1 TABLET (10 MG) BY MOUTH DAILY, Disp: 30 tablet, Rfl: 0     pravastatin (PRAVACHOL) 40 MG tablet, Take 1 tablet (40 mg) by mouth daily, Disp: 90 tablet, Rfl: 3    Patient Active Problem List   Diagnosis     Screening for hypertension     Family history of colon cancer     Family history of aneurysm     HYPERLIPIDEMIA LDL GOAL <130     Hypothyroidism, unspecified type     Bladder spasms         Today's PHQ-2 Score:   PHQ-2 ( 1999 Pf[employer]) 10/18/2018 6/30/2014   Q1: Little interest or  "pleasure in doing things 0 0   Q2: Feeling down, depressed or hopeless 0 0   PHQ-2 Score 0 0       Abuse: Current or Past(Physical, Sexual or Emotional)- No  Do you feel safe in your environment - Yes    Social History   Substance Use Topics     Smoking status: Never Smoker     Smokeless tobacco: Never Used     Alcohol use No      If you drink alcohol do you typically have >3 drinks per day or >7 drinks per week? No                      Last PSA:   PSA   Date Value Ref Range Status   09/11/2017 1.11 0 - 4 ug/L Final     Comment:     Assay Method:  Chemiluminescence using Siemens Vista analyzer       Reviewed orders with patient. Reviewed health maintenance and updated orders accordingly - Yes    Reviewed and updated as needed this visit by clinical staff  Tobacco  Allergies  Meds  Med Hx  Surg Hx  Fam Hx  Soc Hx        Reviewed and updated as needed this visit by Provider      ROS:  CONSTITUTIONAL: NEGATIVE for fever, chills, change in weight  INTEGUMENTARY/SKIN: NEGATIVE for worrisome rashes, moles or lesions  EYES: NEGATIVE for vision changes or irritation  ENT: NEGATIVE for ear, mouth and throat problems  RESP: NEGATIVE for significant cough or SOB  CV: NEGATIVE for chest pain, palpitations or peripheral edema  GI: NEGATIVE for nausea, abdominal pain, heartburn, or change in bowel habits   male: negative for dysuria, hematuria, decreased urinary stream, erectile dysfunction, urethral discharge  MUSCULOSKELETAL: NEGATIVE for significant arthralgias or myalgia  NEURO: NEGATIVE for weakness, dizziness or paresthesias  PSYCHIATRIC: NEGATIVE for changes in mood or affect    OBJECTIVE:   /86  Pulse 79  Temp 97.7  F (36.5  C) (Tympanic)  Ht 5' 8\" (1.727 m)  Wt 214 lb (97.1 kg)  SpO2 96%  BMI 32.54 kg/m2  EXAM:  Exam:  GENERAL APPEARANCE: healthy, alert and no distress  GENERAL APPEARANCE: over weight  EYES: EOMI,  PERRL  HENT: ear canals and TM's normal and nose and mouth without ulcers or " "lesions  NECK: no adenopathy, no asymmetry, masses, or scars and thyroid normal to palpation  RESP: lungs clear to auscultation - no rales, rhonchi or wheezes  CV: regular rates and rhythm, normal S1 S2, no S3 or S4 and no murmur, click or rub -  ABDOMEN:  soft, nontender, no HSM or masses and bowel sounds normal  GU_male: testicles normal without atrophy or masses, hernia on the right side, reducible and penis normal without urethral discharge  MS: extremities normal- no gross deformities noted, no evidence of inflammation in joints, FROM in all extremities.  SKIN: no suspicious lesions or rashes  NEURO: Normal strength and tone, sensory exam grossly normal, mentation intact and speech normal  PSYCH: mentation appears normal and affect normal/bright  LYMPHATICS: No axillary, cervical, inguinal, or supraclavicular nodes      ASSESSMENT/PLAN:   1. Routine history and physical examination of adult  - GASTROENTEROLOGY ADULT REF PROCEDURE ONLY [facility] [phone]  COUNSELING:  Reviewed preventive health counseling, as reflected in patient instructions       Regular exercise       Healthy diet/nutrition       Vision screening       Hearing screening    BP Readings from Last 1 Encounters:   10/18/18 128/86     Estimated body mass index is 32.54 kg/(m^2) as calculated from the following:    Height as of this encounter: 5' 8\" (1.727 m).    Weight as of this encounter: 214 lb (97.1 kg).     reports that he has never smoked. He has never used smokeless tobacco.  Counseling Resources:  ATP IV Guidelines  Pooled Cohorts Equation Calculator  FRAX Risk Assessment  ICSI Preventive Guidelines  Dietary Guidelines for Americans, 2010  USDA's MyPlate  ASA Prophylaxis  Lung CA Screening    2. Hypothyroidism, unspecified type  Use the med and refills done for one year. Do the labs soon and annually.   - TSH; Future  - T4 FREE; Future  - levothyroxine (SYNTHROID/LEVOTHROID) 150 MCG tablet; Take 2 tablets (300 mcg) by mouth " daily  Dispense: 180 tablet; Refill: 3    3. Hyperlipidemia LDL goal <130  Use the lower chol diet and daily exercise. The goal for the LDL is under 130.   - ALT; Future  - Lipid panel reflex to direct LDL Fasting; Future  - ALT; Future  - Lipid panel reflex to direct LDL Fasting; Future  - pravastatin (PRAVACHOL) 40 MG tablet; Take 1 tablet (40 mg) by mouth daily  Dispense: 90 tablet; Refill: 3    4. Non-recurrent unilateral inguinal hernia without obstruction or gangrene  Monitor for the incarceration. Call for the surgery referral at [phone] in [facility]  - GENERAL SURG ADULT REFERRAL        Cassius Barriga MD  [facility]

## 2018-11-02 ENCOUNTER — HOSPITAL ENCOUNTER (EMERGENCY)
Facility: CLINIC | Age: 53
Discharge: HOME | End: 2018-11-02
Attending: EMERGENCY MEDICINE | Admitting: EMERGENCY MEDICINE
Payer: COMMERCIAL

## 2018-11-02 VITALS
HEART RATE: 71 BPM | RESPIRATION RATE: 18 BRPM | OXYGEN SATURATION: 98 % | TEMPERATURE: 98.1 F | SYSTOLIC BLOOD PRESSURE: 161 MMHG | DIASTOLIC BLOOD PRESSURE: 92 MMHG

## 2018-11-02 DIAGNOSIS — R11.0 NAUSEA: ICD-10-CM

## 2018-11-02 DIAGNOSIS — R10.33 PERIUMBILICAL PAIN: ICD-10-CM

## 2018-11-02 PROCEDURE — 25000131 ZZH RX MED GY IP 250 OP 636 PS 637: Performed by: EMERGENCY MEDICINE

## 2018-11-02 PROCEDURE — 99284 EMERGENCY DEPT VISIT MOD MDM: CPT | Mod: 25 | Performed by: EMERGENCY MEDICINE

## 2018-11-02 PROCEDURE — 25000125 ZZHC RX 250: Performed by: EMERGENCY MEDICINE

## 2018-11-02 PROCEDURE — 76705 ECHO EXAM OF ABDOMEN: CPT | Mod: 26 | Performed by: EMERGENCY MEDICINE

## 2018-11-02 PROCEDURE — 76705 ECHO EXAM OF ABDOMEN: CPT | Performed by: EMERGENCY MEDICINE

## 2018-11-02 PROCEDURE — 25000132 ZZH RX MED GY IP 250 OP 250 PS 637: Performed by: EMERGENCY MEDICINE

## 2018-11-02 RX ORDER — ONDANSETRON 4 MG/1
4 TABLET, ORALLY DISINTEGRATING ORAL ONCE
Status: COMPLETED | OUTPATIENT
Start: 2018-11-02 | End: 2018-11-02

## 2018-11-02 RX ORDER — ONDANSETRON 4 MG/1
4 TABLET, ORALLY DISINTEGRATING ORAL ONCE
Status: DISCONTINUED | OUTPATIENT
Start: 2018-11-02 | End: 2018-11-02

## 2018-11-02 RX ADMIN — LIDOCAINE HYDROCHLORIDE 30 ML: 20 SOLUTION ORAL; TOPICAL at 15:50

## 2018-11-02 RX ADMIN — ONDANSETRON 4 MG: 4 TABLET, ORALLY DISINTEGRATING ORAL at 15:50

## 2018-11-02 NOTE — ED PROVIDER NOTES
History     Chief Complaint   Patient presents with     Nausea     constant for 2 weeks, no pain     HPI  Micah Lay is a 53 year old male who presents for nausea and abdominal pain.  The past 2 weeks.  Pain is described as bloating and fullness near the periumbilical area.  It does wax and wane but he is not sure what makes it better or worse.  No vomiting.  He has still been able to take oral intake.  No diarrhea.  Normal bowel movement yesterday.  He denies fever, chills, chest pain, shortness of breath, dysuria, or rash.  He has not taking medication for this.    Problem List:    Patient Active Problem List    Diagnosis Date Noted     Non-recurrent unilateral inguinal hernia without obstruction or gangrene 10/18/2018     Priority: Medium     Bladder spasms 09/15/2016     Priority: Medium     Hypothyroidism, unspecified type 05/25/2016     Priority: Medium     HYPERLIPIDEMIA LDL GOAL <130 10/31/2010     Priority: Medium     Family history of colon cancer 08/23/2010     Priority: Medium     Family history of aneurysm 08/23/2010     Priority: Medium     Screening for hypertension 11/03/2008     Priority: Medium        Past Medical History:    Past Medical History:   Diagnosis Date     Other and unspecified hyperlipidemia may 2006     Sprain of lumbar region      Unspecified hypothyroidism        Past Surgical History:    No past surgical history on file.    Family History:    Family History   Problem Relation Age of Onset     Arthritis Mother      Thyroid Disease Mother      Lipids Mother      Hypertension Father      Allergies Son      Neurologic Disorder Son      taking concerta     Connective Tissue Disorder Maternal Grandfather      Hypertension Brother      Obesity Brother      Lipids Brother      Cardiovascular Brother      ansyerusm       Social History:  Marital Status:   [2]  Social History   Substance Use Topics     Smoking status: Never Smoker     Smokeless tobacco: Never Used     Alcohol  use No        Medications:      levothyroxine (SYNTHROID/LEVOTHROID) 150 MCG tablet   Melatonin (MELATONIN TR) 10 MG TBCR   oxybutynin (DITROPAN-XL) 10 MG 24 hr tablet   ranitidine (ZANTAC) 150 MG tablet         Review of Systems  Pertinent positives and negatives listed in the HPI, all other systems reviewed and are negative.    Physical Exam   BP: (!) 161/92  Pulse: 71  Temp: 98.1  F (36.7  C)  Resp: 18  SpO2: 98 %      Physical Exam   Constitutional: He is oriented to person, place, and time. He appears well-developed and well-nourished. He appears distressed.   HENT:   Head: Normocephalic and atraumatic.   Right Ear: External ear normal.   Left Ear: External ear normal.   Nose: Nose normal.   Eyes: Conjunctivae are normal. No scleral icterus.   Neck: Normal range of motion.   Cardiovascular: Normal rate and regular rhythm.    Pulmonary/Chest: Effort normal. No stridor. No respiratory distress.   Abdominal: Soft. He exhibits no distension. There is no tenderness. There is no rebound and no guarding.   Neurological: He is alert and oriented to person, place, and time.   Skin: Skin is warm and dry. He is not diaphoretic.   Psychiatric: He has a normal mood and affect. His behavior is normal.   Nursing note and vitals reviewed.      ED Course     ED Course     Procedures    Results for orders placed during the hospital encounter of 11/02/18   POC US RETROPERITONEAL LIMITED    Impression [facility] Procedure Note      Limited Bedside ED Aorta Ultrasound:    PROCEDURE: PERFORMED BY: Dr. Zurdo Bashir  INDICATIONS:  Abdominal Pain    PROBE:  Low frequency convex probe  BODY LOCATION: Abdomen  FINDINGS:  The ultrasound was performed using longitudinal and transverse views.   Normal: Abdominal aorta < 4 cm.  MEASUREMENT:  1.4 cm  INTERPRETATION:  The evaluation of the aorta was of normal caliber (ie < 4cm in the transverse/longitudinal views) without evidence of aneurysm or dilation.  Aorta visualized in  entirety from insertion into the intra-abdominal cavity to bifurcation into iliac vessels.  IMAGE DOCUMENTATION: Images were archived to PACs system.     [facility] Procedure Note      Limited Bedside ED Gallbladder  Ultrasound:    PROCEDURE: PERFORMED BY: Dr. Zurdo Bashir  INDICATIONS:  Abdominal Pain  PROBE:  Low frequency convex probe  BODY LOCATION: Abdomen  FINDINGS:   An ultrasound of the gallbladder was performed using longitudinal and transverse views.  Gallstone(s):  Absent  Gallbladder sludge:  Absent  Sonographic Smith's sign:  Absent  Gallbladder wall thickening (greater than 4 mm):  Absent  Pericholecystic fluid: Absent  Common bile duct (dilated if internal diameter greater than 6 mm): Unable to visualize   INTERPRETATION:  The gallbladder evaluation is normal with no gallstones/sludge, no sonographic Smith s sign, no GB wall thickening, no pericholecystic fluid, and without evidence of cholelithiasis or cholecystitis.  IMAGE DOCUMENTATION: Images were archived to PACs system.              Critical Care time:  none               Results for orders placed or performed during the hospital encounter of 11/02/18 (from the past 24 hour(s))   POC US RETROPERITONEAL LIMITED    Impression    [facility] Procedure Note      Limited Bedside ED Aorta Ultrasound:    PROCEDURE: PERFORMED BY: Dr. Zurdo Bashir  INDICATIONS:  Abdominal Pain    PROBE:  Low frequency convex probe  BODY LOCATION: Abdomen  FINDINGS:  The ultrasound was performed using longitudinal and transverse views.   Normal: Abdominal aorta < 4 cm.  MEASUREMENT:  1.4 cm  INTERPRETATION:  The evaluation of the aorta was of normal caliber (ie < 4cm in the transverse/longitudinal views) without evidence of aneurysm or dilation.  Aorta visualized in entirety from insertion into the intra-abdominal cavity to bifurcation into iliac vessels.  IMAGE DOCUMENTATION: Images were archived to PACs system.     [facility]  Procedure Note      Limited Bedside ED Gallbladder  Ultrasound:    PROCEDURE: PERFORMED BY: Dr. Zurdo Bashir  INDICATIONS:  Abdominal Pain  PROBE:  Low frequency convex probe  BODY LOCATION: Abdomen  FINDINGS:   An ultrasound of the gallbladder was performed using longitudinal and transverse views.  Gallstone(s):  Absent  Gallbladder sludge:  Absent  Sonographic Smith's sign:  Absent  Gallbladder wall thickening (greater than 4 mm):  Absent  Pericholecystic fluid: Absent  Common bile duct (dilated if internal diameter greater than 6 mm): Unable to visualize   INTERPRETATION:  The gallbladder evaluation is normal with no gallstones/sludge, no sonographic Smith s sign, no GB wall thickening, no pericholecystic fluid, and without evidence of cholelithiasis or cholecystitis.  IMAGE DOCUMENTATION: Images were archived to PACs system.        Medications   lidocaine (viscous) (XYLOCAINE) 2 % 15 mL, alum & mag hydroxide-simethicone (MYLANTA ES/MAALOX  ES) 15 mL GI Cocktail (30 mLs Oral Given 11/2/18 1550)   ondansetron (ZOFRAN-ODT) ODT tab 4 mg (4 mg Oral Given 11/2/18 1550)       Assessments & Plan (with Medical Decision Making)   53-year-old male with 2 weeks of periumbilical abdominal pain with nausea.  Temperature is 36.7 C, heart rate 71, SPO2 is 98% on room air.  Bedside ultrasound is negative for signs of AAA or cholelithiasis or cholecystitis.  He is given oral ondansetron and a GI cocktail.  On recheck his symptoms are gone.  He is feels much better and is tolerating oral intake.  Recheck of his abdomen is benign and not concerning for an acute surgical process such as SBO, appendicitis, diverticulitis.  At this point he is safe to discharge with instructions to try taking ranitidine twice a day for the next 2 weeks, return if worse, otherwise follow-up in clinic for recheck.  The patient is in agreement with this plan.    I have reviewed the nursing notes.    I have reviewed the findings, diagnosis, plan  and need for follow up with the patient.       New Prescriptions    RANITIDINE (ZANTAC) 150 MG TABLET    Take 1 tablet (150 mg) by mouth 2 times daily for 15 days       Final diagnoses:   Periumbilical pain   Nausea       11/2/2018   [facility] EMERGENCY DEPARTMENT     Zurdo Bashir MD  11/02/18 9759

## 2018-11-02 NOTE — ED NOTES
Pt presents to ED for complaint of intermittent upper abd pain for 2 weeks. Pt denies any diarrhea or constipation, some nausea with pain. Denies chest pain. Pt does not appear acutely distressed at this time.

## 2018-11-02 NOTE — ED AVS SNAPSHOT
[facility] Emergency Department    [address] [place] [address]    Phone:  [phone]    Fax:  [phone]                                       Micah Lay   MRN: [id number]    Department:  [facility] Emergency Department   Date of Visit:  11/2/2018           After Visit Summary Signature Page     I have received my discharge instructions, and my questions have been answered. I have discussed any challenges I see with this plan with the nurse or doctor.    ..........................................................................................................................................  Patient/Patient Representative Signature      ..........................................................................................................................................  Patient Representative Print Name and Relationship to Patient    ..................................................               ................................................  Date                                   Time    ..........................................................................................................................................  Reviewed by Signature/Title    ...................................................              ..............................................  Date                                               Time          22EPIC Rev 08/18

## 2018-11-02 NOTE — DISCHARGE INSTRUCTIONS
Try taking ranitidine twice a day for the next 2 weeks to see if this helps your symptoms.  Return to the emergency department if you have worsening pain, repeated vomiting, or other concerns.  Otherwise follow-up in clinic in 2 weeks for a recheck.

## 2018-11-02 NOTE — ED AVS SNAPSHOT
[facility] Emergency Department    [address] [address]    Phone:  [phone]    Fax:  [phone]                                       Micah Lay   MRN: [id number]    Department:  [facility] Emergency Department   Date of Visit:  11/2/2018           Patient Information     Date Of Birth          1965        Your diagnoses for this visit were:     Periumbilical pain     Nausea        You were seen by Zurdo Bashir MD.        Discharge Instructions       Try taking ranitidine twice a day for the next 2 weeks to see if this helps your symptoms.  Return to the emergency department if you have worsening pain, repeated vomiting, or other concerns.  Otherwise follow-up in clinic in 2 weeks for a recheck.    24 Hour Appointment Hotline       To make an appointment at any [facility] clinic, call [phone] ([phone]). If you don't have a family doctor or clinic, we will help you find one. [facility] clinics are conveniently located to serve the needs of you and your family.             Review of your medicines      START taking        Dose / Directions Last dose taken    ranitidine 150 MG tablet   Commonly known as:  ZANTAC   Dose:  150 mg   Quantity:  30 tablet        Take 1 tablet (150 mg) by mouth 2 times daily for 15 days   Refills:  0          Our records show that you are taking the medicines listed below. If these are incorrect, please call your family doctor or clinic.        Dose / Directions Last dose taken    levothyroxine 150 MCG tablet   Commonly known as:  SYNTHROID/LEVOTHROID   Dose:  300 mcg   Quantity:  180 tablet        Take 2 tablets (300 mcg) by mouth daily   Refills:  3        MELATONIN TR 10 MG Tbcr   Dose:  1 tablet   Generic drug:  Melatonin        Take 1 tablet by mouth At Bedtime   Refills:  0        oxybutynin 10 MG 24 hr tablet   Commonly known as:  DITROPAN-XL   Quantity:  30 tablet        TAKE 1 TABLET (10 MG) BY MOUTH DAILY    Refills:  0                Prescriptions were sent or printed at these locations (1 Prescription)                   [facility] - [place], [place], [place] - [address]   [address], [place] [address]    Telephone:  [phone]   Fax:  [phone]   Hours:                  E-Prescribed (1 of 1)         ranitidine (ZANTAC) 150 MG tablet                Procedures and tests performed during your visit     POC US RETROPERITONEAL LIMITED      Orders Needing Specimen Collection     None      Pending Results     No orders found from 10/31/2018 to 11/3/2018.            Pending Culture Results     No orders found from 10/31/2018 to 11/3/2018.            Pending Results Instructions     If you had any lab results that were not finalized at the time of your Discharge, you can call the ED Lab Result RN at [phone]. You will be contacted by this team for any positive Lab results or changes in treatment. The nurses are available 7 days a week from 10A to 6:30P.  You can leave a message 24 hours per day and they will return your call.        Test Results From Your Hospital Stay        11/2/2018  3:53 PM      Impression     [facility] Procedure Note      Limited Bedside ED Aorta Ultrasound:    PROCEDURE: PERFORMED BY: Dr. Zurdo Bashir  INDICATIONS:  Abdominal Pain    PROBE:  Low frequency convex probe  BODY LOCATION: Abdomen  FINDINGS:  The ultrasound was performed using longitudinal and transverse views.   Normal: Abdominal aorta < 4 cm.  MEASUREMENT:  1.4 cm  INTERPRETATION:  The evaluation of the aorta was of normal caliber (ie < 4cm in the transverse/longitudinal views) without evidence of aneurysm or dilation.  Aorta visualized in entirety from insertion into the intra-abdominal cavity to bifurcation into iliac vessels.  IMAGE DOCUMENTATION: Images were archived to PACs system.     [facility] Procedure Note      Limited Bedside ED Gallbladder  Ultrasound:    PROCEDURE: PERFORMED BY:  Dr. Zurdo Bashir  INDICATIONS:  Abdominal Pain  PROBE:  Low frequency convex probe  BODY LOCATION: Abdomen  FINDINGS:   An ultrasound of the gallbladder was performed using longitudinal and transverse views.  Gallstone(s):  Absent  Gallbladder sludge:  Absent  Sonographic Smith's sign:  Absent  Gallbladder wall thickening (greater than 4 mm):  Absent  Pericholecystic fluid: Absent  Common bile duct (dilated if internal diameter greater than 6 mm): Unable to visualize   INTERPRETATION:  The gallbladder evaluation is normal with no gallstones/sludge, no sonographic Smith s sign, no GB wall thickening, no pericholecystic fluid, and without evidence of cholelithiasis or cholecystitis.  IMAGE DOCUMENTATION: Images were archived to PACs system.                 Thank you for choosing [facility]       Thank you for choosing [facility] for your care. Our goal is always to provide you with excellent care. Hearing back from our patients is one way we can continue to improve our services. Please take a few minutes to complete the written survey that you may receive in the mail after you visit with us. Thank you!        [facility] Information     [facility] gives you secure access to your electronic health record. If you see a primary care provider, you can also send messages to your care team and make appointments. If you have questions, please call your primary care clinic.  If you do not have a primary care provider, please call [phone] and they will assist you.        Care EveryWhere ID     This is your Care EveryWhere ID. This could be used by other organizations to access your [facility] medical records  [id number]        Equal Access to Services     VANESSA ORTIZ : Hadii erik Schaeffer, wakaitlynnda paradise, qaybta josé miguelalhiram ramirez . So [facility] [phone].    ATENCIÓN: Si habla español, tiene a joaquin disposición servicios gratuitos de asistencia lingüística. Llame al  [phone].    We comply with applicable federal civil rights laws and [place] laws. We do not discriminate on the basis of race, color, national origin, age, disability, sex, sexual orientation, or gender identity.            After Visit Summary       This is your record. Keep this with you and show to your community pharmacist(s) and doctor(s) at your next visit.

## 2018-11-03 DIAGNOSIS — N32.89 BLADDER SPASMS: ICD-10-CM

## 2018-11-05 RX ORDER — OXYBUTYNIN CHLORIDE 10 MG/1
TABLET, EXTENDED RELEASE ORAL
Qty: 30 TABLET | Refills: 11 | Status: SHIPPED | OUTPATIENT
Start: 2018-11-05 | End: 2019-09-01

## 2018-11-05 NOTE — TELEPHONE ENCOUNTER
"Requested Prescriptions   Pending Prescriptions Disp Refills     oxybutynin (DITROPAN-XL) 10 MG 24 hr tablet [Pharmacy Med Name: OXYBUTYNIN CL ER 10 MG TABLET] 30 tablet 0     Sig: TAKE 1 TABLET (10 MG) BY MOUTH DAILY    Muscarinic Antagonists (Urinary Incontinence Agents) Passed    11/3/2018 12:28 PM       Passed - Recent (12 mo) or future (30 days) visit within the authorizing provider's specialty    Patient had office visit in the last 12 months or has a visit in the next 30 days with authorizing provider or within the authorizing provider's specialty.  See \"Patient Info\" tab in inbasket, or \"Choose Columns\" in Meds & Orders section of the refill encounter.             Passed - Medication is Oxybutynin and patient is 5 years of age or older       Passed - Patient does not have a diagnosis of glaucoma on the problem list    If glaucoma diagnosis is new, refer refill to physician.         Passed - Patient is 18 years of age or older        Prescription approved per [facility] Refill Protocol.    "

## 2018-12-04 DIAGNOSIS — N32.89 BLADDER SPASMS: ICD-10-CM

## 2018-12-04 RX ORDER — OXYBUTYNIN CHLORIDE 10 MG/1
TABLET, EXTENDED RELEASE ORAL
Qty: 30 TABLET | Refills: 11 | Status: CANCELLED | OUTPATIENT
Start: 2018-12-04

## 2018-12-04 NOTE — TELEPHONE ENCOUNTER
"Wants 90 day supply  Requested Prescriptions   Pending Prescriptions Disp Refills     oxybutynin ER (DITROPAN-XL) 10 MG 24 hr tablet 30 tablet 11    Muscarinic Antagonists (Urinary Incontinence Agents) Passed    12/4/2018  1:03 PM   Last Written Prescription Date:  11/5/18  Last Fill Quantity: 30,  # refills: 11   Last office visit: 10/18/2018 with prescribing provider:  Linus   Future Office Visit:        Passed - Recent (12 mo) or future (30 days) visit within the authorizing provider's specialty    Patient had office visit in the last 12 months or has a visit in the next 30 days with authorizing provider or within the authorizing provider's specialty.  See \"Patient Info\" tab in inbasket, or \"Choose Columns\" in Meds & Orders section of the refill encounter.             Passed - Medication is Oxybutynin and patient is 5 years of age or older       Passed - Patient does not have a diagnosis of glaucoma on the problem list    If glaucoma diagnosis is new, refer refill to physician.         Passed - Patient is 18 years of age or older          "

## 2019-02-28 ENCOUNTER — TELEPHONE (OUTPATIENT)
Dept: FAMILY MEDICINE | Facility: CLINIC | Age: 54
End: 2019-02-28

## 2019-02-28 NOTE — TELEPHONE ENCOUNTER
Panel Management Review      Patient has the following on his problem list:       Composite cancer screening  Chart review shows that this patient is due/due soon for the following Colonoscopy, ordered 10/2018  Summary:    Patient is due/failing the following:   COLONOSCOPY    Action needed:   Colonoscopy ordered 10/2018, remind to complete, verify if he still has the prep information.    Type of outreach:    Phone, left message for patient to call back.     Questions for provider review:    None                                                                                                                                    VASILIY Ulloa MA      Chart routed to Care Team .

## 2019-06-04 ENCOUNTER — TELEPHONE (OUTPATIENT)
Dept: FAMILY MEDICINE | Facility: CLINIC | Age: 54
End: 2019-06-04

## 2019-06-04 NOTE — LETTER
[facility]  [address]  [facility] [address]  [phone]        June 4, 2019      Micah Lay  [address] [facility] [address]    Dear Micah,    I care about your health and have reviewed your health plan. I have reviewed your medical conditions, medication list, and lab results and am making recommendations based on this review, to better manage your health.    You are in particular need of attention regarding:  -Colon Cancer Screening    I am recommending that you:  -schedule a COLONOSCOPY to look for colon cancer (due every 10 years or 5 years in higher risk situations.)   Colon cancer is now the second leading cause of death in the [place] for both men and women and there are over 130,000 new cases and 50,000 deaths per year from colon cancer.  Colonoscopies can prevent 90-95% of these deaths.  Problem lesions can be removed before they ever become cancer.  This test is not only looking for cancer, but also getting rid of precancerious lesions.  If you do not wish to do a colonoscopy or cannot afford to do one, at this time, there is another option. It is called a FIT test or Fecal Immunochemical Occult Blood Test (take home stool sample kit).  It does not replace the colonoscopy for colorectal cancer screening, but it can detect hidden bleeding in the lower colon.  It does need to be repeated every year and if a positive result is obtained, you would be referred for a colonoscopy.  If you have completed either one of these tests at another facility, please have the records sent to our clinic so that we can best coordinate your care. You may call [phone] to schedule your procedure.    Here is a list of Health Maintenance topics that are due now or due soon:  Health Maintenance Due   Topic Date Due     COLONOSCOPY  09/30/1976     HIV SCREENING  09/30/1981     ZOSTER IMMUNIZATION (1 of 2) 09/30/2016     DTAP/TDAP/TD  IMMUNIZATION (3 - Td) 11/26/2017     PHQ-2  01/01/2019       Please call us at [phone] (or use [facility] App) to address the above recommendations.     Thank you for trusting [facility] and we appreciate the opportunity to serve you.  We look forward to supporting your healthcare needs in the future.    Healthy Regards,    Dr. Cassius Barirga & Care Team/ [facility]

## 2019-06-04 NOTE — TELEPHONE ENCOUNTER
Panel Management Review    Composite cancer screening  Chart review shows that this patient is due/due soon for the following Colonoscopy and Fecal Colorectal (FIT)  Summary:    Patient is due/failing the following:   COLONOSCOPY/FIT    Action needed:   Patient needs referral/order: colonoscopy/FIT test    Type of outreach:    Sent letter.    Questions for provider review:    None                                                                                                                                    Theodora Henry CMA..........6/4/2019 9:28 AM

## 2019-09-01 DIAGNOSIS — N32.89 BLADDER SPASMS: ICD-10-CM

## 2019-09-03 RX ORDER — OXYBUTYNIN CHLORIDE 10 MG/1
TABLET, EXTENDED RELEASE ORAL
Qty: 90 TABLET | Refills: 3 | Status: SHIPPED | OUTPATIENT
Start: 2019-09-03 | End: 2020-01-06

## 2019-09-03 NOTE — TELEPHONE ENCOUNTER
"Requested Prescriptions   Pending Prescriptions Disp Refills     oxybutynin ER (DITROPAN-XL) 10 MG 24 hr tablet [Pharmacy Med Name: OXYBUTYNIN CL ER 10 MG TABLET] 90 tablet 3     Sig: TAKE 1 TABLET (10 MG) BY MOUTH DAILY   Last Written Prescription Date:  11/5/18  Last Fill Quantity: 30 tab,  # refills: 11   Last office visit: 10/18/2018 with prescribing provider:  Cassius Barriga     Future Office Visit:        Muscarinic Antagonists (Urinary Incontinence Agents) Passed - 9/1/2019  9:02 AM        Passed - Recent (12 mo) or future (30 days) visit within the authorizing provider's specialty     Patient had office visit in the last 12 months or has a visit in the next 30 days with authorizing provider or within the authorizing provider's specialty.  See \"Patient Info\" tab in inbasket, or \"Choose Columns\" in Meds & Orders section of the refill encounter.              Passed - Medication is Oxybutynin and patient is 5 years of age or older        Passed - Patient does not have a diagnosis of glaucoma on the problem list     If glaucoma diagnosis is new, refer refill to physician.          Passed - Medication is active on med list        Passed - Patient is 18 years of age or older          "

## 2019-09-03 NOTE — TELEPHONE ENCOUNTER
Routing refill request to provider for review/approval because:  Drug not on the [facility] refill protocol for diagnosis: Bladder spasms.

## 2019-11-25 DIAGNOSIS — E78.5 HYPERLIPIDEMIA LDL GOAL <130: ICD-10-CM

## 2019-11-25 DIAGNOSIS — E03.9 HYPOTHYROIDISM, UNSPECIFIED TYPE: ICD-10-CM

## 2019-11-25 NOTE — TELEPHONE ENCOUNTER
"Requested Prescriptions   Pending Prescriptions Disp Refills     pravastatin (PRAVACHOL) 40 MG tablet [Pharmacy Med Name: PRAVASTATIN SODIUM 40 MG TAB] 90 tablet 3     Sig: TAKE 1 TABLET BY MOUTH EVERY DAY  Not on med list  Last Written Prescription Date:  10/18/2018 d/c'd on 11/2/2018  Last Fill Quantity: 90,  # refills: 3   Last office visit: 10/18/2018 with prescribing provider:  Linus    Future Office Visit:           Statins Protocol Failed - 11/25/2019  2:40 AM        Failed - LDL on file in past 12 months     Recent Labs   Lab Test 09/11/17  1112   *             Failed - Recent (12 mo) or future (30 days) visit within the authorizing provider's specialty     Patient has had an office visit with the authorizing provider or a provider within the authorizing providers department within the previous 12 mos or has a future within next 30 days. See \"Patient Info\" tab in inbasket, or \"Choose Columns\" in Meds & Orders section of the refill encounter.              Failed - Medication is active on med list        Passed - No abnormal creatine kinase in past 12 months     No lab results found.             Passed - Patient is age 18 or older        levothyroxine (SYNTHROID/LEVOTHROID) 150 MCG tablet [Pharmacy Med Name: LEVOTHYROXINE 150 MCG TABLET] 180 tablet 3     Sig: TAKE 2 TABLETS (300 MCG) BY MOUTH DAILY  Last Written Prescription Date:  10/18/2018  Last Fill Quantity: 180,  # refills: 3   Last office visit: 10/18/2018 with prescribing provider:  Linus    Future Office Visit:           Thyroid Protocol Failed - 11/25/2019  2:40 AM        Failed - Recent (12 mo) or future (30 days) visit within the authorizing provider's specialty     Patient has had an office visit with the authorizing provider or a provider within the authorizing providers department within the previous 12 mos or has a future within next 30 days. See \"Patient Info\" tab in inbasket, or \"Choose Columns\" in Meds & Orders section of the " refill encounter.              Failed - Normal TSH on file in past 12 months     Recent Labs   Lab Test 09/11/17  1112   TSH 1.53              Passed - Patient is 12 years or older        Passed - Medication is active on med list

## 2019-11-25 NOTE — LETTER
[facility]  [address]  [facility] [address]  [phone]        November 26, 2019  Micah Lay  [address]  [facility] [address]    Dear Micah,    I care about your health and have reviewed your health plan. I have reviewed your medical conditions, medication list, and lab results and am making recommendations based on this review, to better manage your health.    You are in particular need of attention regarding:  -Wellness (Physical) Visit    Your last office visit was 10/18/18.    I am recommending that you:  -schedule a WELLNESS (Physical) APPOINTMENT with me.        Please call us at [phone] (or use [employer]) to address the above recommendations.     Thank you for trusting [facility] and we appreciate the opportunity to serve you.  We look forward to supporting your healthcare needs in the future.    Healthy Regards,      Cassius Barriga MD/Radha BECKFORD RN

## 2019-11-26 RX ORDER — PRAVASTATIN SODIUM 40 MG
TABLET ORAL
Qty: 90 TABLET | Refills: 3 | OUTPATIENT
Start: 2019-11-26

## 2019-11-26 RX ORDER — LEVOTHYROXINE SODIUM 150 UG/1
300 TABLET ORAL DAILY
Qty: 60 TABLET | Refills: 0 | Status: SHIPPED | OUTPATIENT
Start: 2019-11-26 | End: 2020-01-06

## 2019-11-26 NOTE — TELEPHONE ENCOUNTER
Refill for pravastatin not appropriate:  This Order Has Been Discontinued     Order Status Reason By On   Discontinued Not filled/taken by Patient Shanice Rivero 11/2/18 6038     Medication(levothyroxine) is being filled for 1 time refill only due to:  : Labs not current:  TSH (last done 9/11/2017).   Patient needs to be seen because it has been more than 1 year since last office visit.    Letter mailed to pt.     GAVI Toth RN

## 2020-01-02 ENCOUNTER — TELEPHONE (OUTPATIENT)
Dept: FAMILY MEDICINE | Facility: CLINIC | Age: 55
End: 2020-01-02

## 2020-01-02 DIAGNOSIS — E03.9 HYPOTHYROIDISM, UNSPECIFIED TYPE: ICD-10-CM

## 2020-01-02 DIAGNOSIS — E78.5 HYPERLIPIDEMIA LDL GOAL <130: Primary | ICD-10-CM

## 2020-01-02 DIAGNOSIS — Z12.5 SCREENING FOR PROSTATE CANCER: ICD-10-CM

## 2020-01-02 NOTE — TELEPHONE ENCOUNTER
Reason for Call: Request for an order or referral:    Order or referral being requested: Patient is calling asking for a order to have labs done before his OV. He would like Fasting thyroid, cholesterol. T4 Prostate, ALT Lipid     Date needed: before my next appointment    Has the patient been seen by the PCP for this problem? NO  Phone number Patient can be reached at:  Home number on file [phone] (home)    Best Time:  any    Can we leave a detailed message on this number?  YES    Call taken on 1/2/2020 at 2:29 PM by Brittany Tesfaye

## 2020-01-02 NOTE — TELEPHONE ENCOUNTER
Dr. Barriga,    Patient asking for labs pre-office visit.  I have pended a few for your review.  Ladan KIRK RN

## 2020-01-06 ENCOUNTER — OFFICE VISIT (OUTPATIENT)
Dept: FAMILY MEDICINE | Facility: CLINIC | Age: 55
End: 2020-01-06
Payer: COMMERCIAL

## 2020-01-06 VITALS
HEART RATE: 68 BPM | RESPIRATION RATE: 16 BRPM | OXYGEN SATURATION: 97 % | BODY MASS INDEX: 32.28 KG/M2 | HEIGHT: 68 IN | DIASTOLIC BLOOD PRESSURE: 90 MMHG | TEMPERATURE: 97.1 F | SYSTOLIC BLOOD PRESSURE: 140 MMHG | WEIGHT: 213 LBS

## 2020-01-06 DIAGNOSIS — E03.9 HYPOTHYROIDISM, UNSPECIFIED TYPE: ICD-10-CM

## 2020-01-06 DIAGNOSIS — Z12.11 SPECIAL SCREENING FOR MALIGNANT NEOPLASMS, COLON: ICD-10-CM

## 2020-01-06 DIAGNOSIS — Z23 NEED FOR PROPHYLACTIC VACCINATION AND INOCULATION AGAINST INFLUENZA: ICD-10-CM

## 2020-01-06 DIAGNOSIS — N32.89 BLADDER SPASMS: ICD-10-CM

## 2020-01-06 DIAGNOSIS — Z12.5 SCREENING FOR PROSTATE CANCER: ICD-10-CM

## 2020-01-06 DIAGNOSIS — E78.5 HYPERLIPIDEMIA LDL GOAL <130: ICD-10-CM

## 2020-01-06 DIAGNOSIS — Z00.00 ANNUAL PHYSICAL EXAM: Primary | ICD-10-CM

## 2020-01-06 LAB
CHOLEST SERPL-MCNC: 254 MG/DL
HDLC SERPL-MCNC: 42 MG/DL
LDLC SERPL CALC-MCNC: 157 MG/DL
NONHDLC SERPL-MCNC: 212 MG/DL
PSA SERPL-ACNC: 1.28 UG/L (ref 0–4)
T4 FREE SERPL-MCNC: 1.35 NG/DL (ref 0.76–1.46)
TRIGL SERPL-MCNC: 273 MG/DL
TSH SERPL DL<=0.005 MIU/L-ACNC: 0.37 MU/L (ref 0.4–4)

## 2020-01-06 PROCEDURE — 90471 IMMUNIZATION ADMIN: CPT | Performed by: NURSE PRACTITIONER

## 2020-01-06 PROCEDURE — 84443 ASSAY THYROID STIM HORMONE: CPT | Performed by: FAMILY MEDICINE

## 2020-01-06 PROCEDURE — G0103 PSA SCREENING: HCPCS | Performed by: FAMILY MEDICINE

## 2020-01-06 PROCEDURE — 90682 RIV4 VACC RECOMBINANT DNA IM: CPT | Performed by: NURSE PRACTITIONER

## 2020-01-06 PROCEDURE — 84439 ASSAY OF FREE THYROXINE: CPT | Performed by: FAMILY MEDICINE

## 2020-01-06 PROCEDURE — 36415 COLL VENOUS BLD VENIPUNCTURE: CPT | Performed by: FAMILY MEDICINE

## 2020-01-06 PROCEDURE — 80061 LIPID PANEL: CPT | Performed by: FAMILY MEDICINE

## 2020-01-06 PROCEDURE — 99396 PREV VISIT EST AGE 40-64: CPT | Mod: 25 | Performed by: NURSE PRACTITIONER

## 2020-01-06 RX ORDER — PRAVASTATIN SODIUM 40 MG
40 TABLET ORAL DAILY
Qty: 90 TABLET | Refills: 3 | Status: SHIPPED | OUTPATIENT
Start: 2020-01-06 | End: 2021-01-26

## 2020-01-06 RX ORDER — OXYBUTYNIN CHLORIDE 10 MG/1
10 TABLET, EXTENDED RELEASE ORAL DAILY
Qty: 90 TABLET | Refills: 3 | Status: SHIPPED | OUTPATIENT
Start: 2020-01-06 | End: 2021-03-09

## 2020-01-06 RX ORDER — LEVOTHYROXINE SODIUM 150 UG/1
300 TABLET ORAL DAILY
Qty: 180 TABLET | Refills: 3 | Status: SHIPPED | OUTPATIENT
Start: 2020-01-06 | End: 2021-01-22

## 2020-01-06 RX ORDER — SAW/PYGEUM/BETA/HERB/D3/B6/ZN 30 MG-25MG
1 CAPSULE ORAL AT BEDTIME
Status: CANCELLED | OUTPATIENT
Start: 2020-01-06

## 2020-01-06 RX ORDER — PRAVASTATIN SODIUM 40 MG
40 TABLET ORAL DAILY
Refills: 3 | COMMUNITY
Start: 2019-08-21 | End: 2020-01-06

## 2020-01-06 ASSESSMENT — MIFFLIN-ST. JEOR: SCORE: 1781.69

## 2020-01-06 NOTE — PROGRESS NOTES
SUBJECTIVE:   CC: Micah Lay is an 54 year old male who presents for preventative health visit.     Healthy Habits:    Getting at least 3 servings of Calcium per day:  NO    Bi-annual eye exam:  NO    Dental care twice a year:  Yes    Sleep apnea or symptoms of sleep apnea:  None    Diet:  Regular (no restrictions)    Frequency of exercise:  None (Active with his job )    Duration of exercise:  Other    Taking medications regularly:  Yes    Barriers to taking medications:  None    Medication side effects:  None    PHQ-2 Total Score:    Additional concerns today:  No        Today's PHQ-2 Score:   PHQ-2 ( 1999 Pfizer) 1/6/2020   Q1: Little interest or pleasure in doing things 0   Q2: Feeling down, depressed or hopeless 0   PHQ-2 Score 0       Abuse: Current or Past(Physical, Sexual or Emotional)- No  Do you feel safe in your environment? Yes        Social History     Tobacco Use     Smoking status: Never Smoker     Smokeless tobacco: Never Used   Substance Use Topics     Alcohol use: No     If you drink alcohol do you typically have >3 drinks per day or >7 drinks per week? Not applicable    Alcohol Use 1/6/2020   Prescreen: >3 drinks/day or >7 drinks/week? Not Applicable       Last PSA:   PSA   Date Value Ref Range Status   09/11/2017 1.11 0 - 4 ug/L Final     Comment:     Assay Method:  Chemiluminescence using Siemens Vista analyzer       Reviewed orders with patient. Reviewed health maintenance and updated orders accordingly - Yes  Lab work is in process  Labs reviewed in EPIC  BP Readings from Last 3 Encounters:   01/06/20 (!) 140/90   11/02/18 (!) 161/92   10/18/18 128/86    Wt Readings from Last 3 Encounters:   01/06/20 96.6 kg (213 lb)   10/18/18 97.1 kg (214 lb)   11/13/17 96.6 kg (213 lb)                    Reviewed and updated as needed this visit by clinical staff  Tobacco  Allergies  Meds  Med Hx  Surg Hx  Fam Hx  Soc Hx        Reviewed and updated as needed this visit by Provider       "      Review of Systems  CONSTITUTIONAL: NEGATIVE for fever, chills, change in weight  INTEGUMENTARY/SKIN: NEGATIVE for worrisome rashes, moles or lesions  EYES: NEGATIVE for vision changes or irritation  ENT: NEGATIVE for ear, mouth and throat problems  RESP: NEGATIVE for significant cough or SOB  CV: NEGATIVE for chest pain, palpitations or peripheral edema  GI: NEGATIVE for nausea, abdominal pain, heartburn, or change in bowel habits   male: negative for dysuria, hematuria, decreased urinary stream, erectile dysfunction, urethral discharge  MUSCULOSKELETAL: NEGATIVE for significant arthralgias or myalgia  NEURO: NEGATIVE for weakness, dizziness or paresthesias  PSYCHIATRIC: NEGATIVE for changes in mood or affect    OBJECTIVE:   BP (!) 140/90 (BP Location: Right arm, Patient Position: Sitting, Cuff Size: Adult Regular)   Pulse 68   Temp 97.1  F (36.2  C) (Tympanic)   Resp 16   Ht 1.721 m (5' 7.75\")   Wt 96.6 kg (213 lb)   SpO2 97%   BMI 32.63 kg/m      Physical Exam  GENERAL: healthy, alert and no distress  EYES: Eyes grossly normal to inspection, PERRL and conjunctivae and sclerae normal  HENT: normal cephalic/atraumatic, right ear: normal: no effusions, no erythema, normal landmarks, left ear: occluded with wax, nose and mouth without ulcers or lesions, oropharynx clear and oral mucous membranes moist  NECK: no adenopathy, no asymmetry, masses, or scars and thyroid normal to palpation  RESP: lungs clear to auscultation - no rales, rhonchi or wheezes  CV: regular rate and rhythm, normal S1 S2, no S3 or S4, no murmur, click or rub, no peripheral edema and peripheral pulses strong  MS: no gross musculoskeletal defects noted, no edema  SKIN: no suspicious lesions or rashes  NEURO: Normal strength and tone, mentation intact and speech normal  PSYCH: mentation appears normal, affect normal/bright    Diagnostic Test Results:  Labs reviewed in Epic    ASSESSMENT/PLAN:   1. Annual physical exam  -lipid panel " "pending   -exam normal  -recommended FIT test for screening of colon cancer     2. Hypothyroidism, unspecified type  -clinically euthyroid, thyroid labs pending   - levothyroxine (SYNTHROID/LEVOTHROID) 150 MCG tablet; Take 2 tablets (300 mcg) by mouth daily  Dispense: 180 tablet; Refill: 3  - pravastatin (PRAVACHOL) 40 MG tablet; Take 1 tablet (40 mg) by mouth daily  Dispense: 90 tablet; Refill: 3    3. Bladder spasms  -well controlled on Oxybutynin, no side effects, refill provided   - oxybutynin ER (DITROPAN-XL) 10 MG 24 hr tablet; Take 1 tablet (10 mg) by mouth daily  Dispense: 90 tablet; Refill: 3    4. Special screening for malignant neoplasms, colon    - Fecal colorectal cancer screen (FIT); Future    5. Need for prophylactic vaccination and inoculation against influenza    - INFLUENZA QUAD, RECOMBINANT, P-FREE (RIV4) (FLUBLOCK) [72665]  - Vaccine Administration, Initial [92746]    COUNSELING:   Reviewed preventive health counseling, as reflected in patient instructions       Regular exercise       Healthy diet/nutrition    Estimated body mass index is 32.63 kg/m  as calculated from the following:    Height as of this encounter: 1.721 m (5' 7.75\").    Weight as of this encounter: 96.6 kg (213 lb).     Weight management plan: Discussed healthy diet and exercise guidelines     reports that he has never smoked. He has never used smokeless tobacco.      Counseling Resources:  ATP IV Guidelines  Pooled Cohorts Equation Calculator  FRAX Risk Assessment  ICSI Preventive Guidelines  Dietary Guidelines for Americans, 2010  USDA's MyPlate  ASA Prophylaxis  Lung CA Screening    [facility]  "

## 2020-01-06 NOTE — PATIENT INSTRUCTIONS
Labs pending    1. Do not use Qtips to clean ears.  2. May use debrox (over the counter) once per week. Or may apply 1-2 drops of baby oil in each ear once per week to soften the wax.  3. Return to clinic for repeat irrigation if your ears begin to feel plugged again

## 2020-01-13 ENCOUNTER — HOSPITAL ENCOUNTER (EMERGENCY)
Facility: CLINIC | Age: 55
Discharge: HOME | End: 2020-01-13
Attending: NURSE PRACTITIONER | Admitting: NURSE PRACTITIONER
Payer: COMMERCIAL

## 2020-01-13 VITALS
BODY MASS INDEX: 32.93 KG/M2 | OXYGEN SATURATION: 99 % | WEIGHT: 215 LBS | SYSTOLIC BLOOD PRESSURE: 144 MMHG | HEART RATE: 69 BPM | DIASTOLIC BLOOD PRESSURE: 84 MMHG | RESPIRATION RATE: 14 BRPM | TEMPERATURE: 97.5 F

## 2020-01-13 DIAGNOSIS — M54.50 ACUTE BILATERAL LOW BACK PAIN WITHOUT SCIATICA: ICD-10-CM

## 2020-01-13 PROCEDURE — G0463 HOSPITAL OUTPT CLINIC VISIT: HCPCS | Performed by: NURSE PRACTITIONER

## 2020-01-13 PROCEDURE — 99214 OFFICE O/P EST MOD 30 MIN: CPT | Mod: Z6 | Performed by: NURSE PRACTITIONER

## 2020-01-13 RX ORDER — CYCLOBENZAPRINE HCL 10 MG
10 TABLET ORAL 3 TIMES DAILY PRN
Qty: 15 TABLET | Refills: 0 | Status: SHIPPED | OUTPATIENT
Start: 2020-01-13 | End: 2020-06-09

## 2020-01-13 ASSESSMENT — ENCOUNTER SYMPTOMS
NUMBNESS: 0
BACK PAIN: 1
ABDOMINAL PAIN: 0
NAUSEA: 0
LIGHT-HEADEDNESS: 0
FEVER: 0
SHORTNESS OF BREATH: 0
CHILLS: 0
NECK STIFFNESS: 0
FATIGUE: 0
VOMITING: 0
DIZZINESS: 0
ARTHRALGIAS: 0
MYALGIAS: 0
WEAKNESS: 0
NECK PAIN: 0
COUGH: 0
JOINT SWELLING: 0
HEADACHES: 0

## 2020-01-13 NOTE — ED PROVIDER NOTES
History     Chief Complaint   Patient presents with     Back Pain     HPI  Micah Lay is a 54 year old male who presents to the urgent care for evaluation of low back pain for three days. Denies any known injury or overuse however patient does do physical labor for work. Pain is worse on the right lower back and described as strong, dull ache without radiation. Pain is worse with any low back muscle tightening or hyperextention. Denies numbness, tingling, changes in bowel or bladder control and saddle anesthesia.  Has been using ibuprofen and applying heat and ice.    Allergies:  No Known Allergies    Problem List:    Patient Active Problem List    Diagnosis Date Noted     Non-recurrent unilateral inguinal hernia without obstruction or gangrene 10/18/2018     Priority: Medium     Bladder spasms 09/15/2016     Priority: Medium     Hypothyroidism, unspecified type 05/25/2016     Priority: Medium     HYPERLIPIDEMIA LDL GOAL <130 10/31/2010     Priority: Medium     Family history of colon cancer 08/23/2010     Priority: Medium     Family history of aneurysm 08/23/2010     Priority: Medium     Screening for hypertension 11/03/2008     Priority: Medium        Past Medical History:    Past Medical History:   Diagnosis Date     Other and unspecified hyperlipidemia may 2006     Sprain of lumbar region      Unspecified hypothyroidism        Past Surgical History:    History reviewed. No pertinent surgical history.    Family History:    Family History   Problem Relation Age of Onset     Arthritis Mother      Thyroid Disease Mother      Lipids Mother      Hypertension Father      Allergies Son      Neurologic Disorder Son         taking concerta     Connective Tissue Disorder Maternal Grandfather      Hypertension Brother      Obesity Brother      Lipids Brother      Cardiovascular Brother         ansteodorousm       Social History:  Marital Status:   [2]  Social History     Tobacco Use     Smoking status: Never  Smoker     Smokeless tobacco: Never Used   Substance Use Topics     Alcohol use: No     Drug use: No        Medications:    cyclobenzaprine (FLEXERIL) 10 MG tablet  levothyroxine (SYNTHROID/LEVOTHROID) 150 MCG tablet  Melatonin (MELATONIN TR) 10 MG TBCR  oxybutynin ER (DITROPAN-XL) 10 MG 24 hr tablet  pravastatin (PRAVACHOL) 40 MG tablet          Review of Systems   Constitutional: Negative for chills, fatigue and fever.   Respiratory: Negative for cough and shortness of breath.    Cardiovascular: Negative for chest pain.   Gastrointestinal: Negative for abdominal pain, nausea and vomiting.   Musculoskeletal: Positive for back pain. Negative for arthralgias, gait problem, joint swelling, myalgias, neck pain and neck stiffness.   Skin: Negative for rash.   Neurological: Negative for dizziness, weakness, light-headedness, numbness and headaches.       Physical Exam   BP: (!) 144/84  Pulse: 69  Temp: 97.5  F (36.4  C)  Resp: 14  Weight: 97.5 kg (215 lb)  SpO2: 99 %      Physical Exam  Constitutional:       General: He is not in acute distress.     Appearance: He is well-developed. He is not diaphoretic.   Eyes:      Conjunctiva/sclera: Conjunctivae normal.      Pupils: Pupils are equal, round, and reactive to light.   Neck:      Musculoskeletal: Normal range of motion and neck supple.   Cardiovascular:      Rate and Rhythm: Normal rate and regular rhythm.   Pulmonary:      Effort: Pulmonary effort is normal. No respiratory distress.      Breath sounds: Normal breath sounds.   Abdominal:      General: There is no distension.      Palpations: Abdomen is soft.      Tenderness: There is no abdominal tenderness.   Musculoskeletal:      Lumbar back: He exhibits decreased range of motion (due to pain) and tenderness (bilateral perispinal). He exhibits no bony tenderness.      Comments: Pulses and perfusion equal bilaterally. No overlying edema, abrasions, ecchymosis or erythema.   Skin:     General: Skin is warm.       Capillary Refill: Capillary refill takes less than 2 seconds.   Neurological:      Mental Status: He is alert and oriented to person, place, and time.         ED Course        Procedures    No results found for this or any previous visit (from the past 24 hour(s)).    Medications - No data to display    Assessments & Plan (with Medical Decision Making)   Patient is a 54-year-old male who presents the urgent care for evaluation of bilateral perispinal low back pain.  See physical exam above.  No indication for imaging at this time.  Provided prescription for Flexeril. Educated on back care techniques and stretches. May use over the counter medications as needed and appropriate. Increase rest and hydration. Return precautions reviewed, all questions answered. Patient agreeable to plan of care and discharged in stable condition, ambulating safely.    I have reviewed the nursing notes.    I have reviewed the findings, diagnosis, plan and need for follow up with the patient.    Discharge Medication List as of 1/13/2020  4:31 PM      START taking these medications    Details   cyclobenzaprine (FLEXERIL) 10 MG tablet Take 1 tablet (10 mg) by mouth 3 times daily as needed for muscle spasms, Disp-15 tablet, R-0, E-Prescribe             Final diagnoses:   Acute bilateral low back pain without sciatica       1/13/2020   [facility] EMERGENCY DEPARTMENT     Aisha Clayton, FLORENCE CNP  01/13/20 2034

## 2020-01-13 NOTE — ED AVS SNAPSHOT
[facility] Emergency Department  [address] [facility] [facility] [address]  Phone:  [phone]  Fax:  [phone]                                    Micah Lay   MRN: [id number]    Department:  [facility] Emergency Department   Date of Visit:  1/13/2020           After Visit Summary Signature Page    I have received my discharge instructions, and my questions have been answered. I have discussed any challenges I see with this plan with the nurse or doctor.    ..........................................................................................................................................  Patient/Patient Representative Signature      ..........................................................................................................................................  Patient Representative Print Name and Relationship to Patient    ..................................................               ................................................  Date                                   Time    ..........................................................................................................................................  Reviewed by Signature/Title    ...................................................              ..............................................  Date                                               Time          22EPIC Rev 08/18

## 2020-05-15 ENCOUNTER — OFFICE VISIT (OUTPATIENT)
Dept: FAMILY MEDICINE | Facility: CLINIC | Age: 55
End: 2020-05-15
Payer: COMMERCIAL

## 2020-05-15 VITALS
WEIGHT: 224 LBS | BODY MASS INDEX: 33.95 KG/M2 | HEART RATE: 78 BPM | HEIGHT: 68 IN | TEMPERATURE: 98.2 F | SYSTOLIC BLOOD PRESSURE: 130 MMHG | RESPIRATION RATE: 16 BRPM | OXYGEN SATURATION: 95 % | DIASTOLIC BLOOD PRESSURE: 78 MMHG

## 2020-05-15 DIAGNOSIS — L08.9 FINGER INFECTION: Primary | ICD-10-CM

## 2020-05-15 PROCEDURE — 99213 OFFICE O/P EST LOW 20 MIN: CPT | Performed by: FAMILY MEDICINE

## 2020-05-15 RX ORDER — CEPHALEXIN 500 MG/1
500 CAPSULE ORAL 3 TIMES DAILY
Qty: 15 CAPSULE | Refills: 1 | Status: SHIPPED | OUTPATIENT
Start: 2020-05-15 | End: 2020-06-03

## 2020-05-15 ASSESSMENT — MIFFLIN-ST. JEOR: SCORE: 1831.59

## 2020-05-15 NOTE — PROGRESS NOTES
"Subjective     Micah Lay is a 54 year old male who presents to clinic today for the following health issues:    HPI   FINGER INFECTION      Duration: 2 1/2 weeks    Description (location/character/radiation): Left index finger, possible infection.  He thought at first it was an ingrown nail.    Intensity:  Mild-moderate    Accompanying signs and symptoms: Redness, swelling, some of the skin is peeling away.  Some blood-drainage from the area at times.  No fever or chills. He can take his other finger and move the nail.    History (similar episodes/previous evaluation): None    Precipitating or alleviating factors: Tender if the fingertip gets bumped.    Therapies tried and outcome: None       Current Outpatient Medications   Medication Instructions     cephALEXin (KEFLEX) 500 mg, Oral, 3 TIMES DAILY     cyclobenzaprine (FLEXERIL) 10 mg, Oral, 3 TIMES DAILY PRN     levothyroxine (SYNTHROID/LEVOTHROID) 300 mcg, Oral, DAILY     Melatonin (MELATONIN TR) 10 MG TBCR 1 tablet, Oral, AT BEDTIME     oxybutynin ER (DITROPAN-XL) 10 mg, Oral, DAILY     pravastatin (PRAVACHOL) 40 mg, Oral, DAILY     Patient Active Problem List   Diagnosis     Screening for hypertension     Family history of colon cancer     Family history of aneurysm     HYPERLIPIDEMIA LDL GOAL <130     Hypothyroidism, unspecified type     Bladder spasms     Non-recurrent unilateral inguinal hernia without obstruction or gangrene       Blood pressure 130/78, pulse 78, temperature 98.2  F (36.8  C), temperature source Tympanic, resp. rate 16, height 1.721 m (5' 7.75\"), weight 101.6 kg (224 lb), SpO2 95 %.    Exam:  GENERAL APPEARANCE: healthy, alert and no distress  SKIN: the left index finger has redness and swelling and minimal drainage.   PSYCH: mentation appears normal and affect normal/bright    (L08.9) Finger infection  (primary encounter diagnosis)  Comment:   Plan: cephALEXin (KEFLEX) 500 MG capsule,         Miscellaneous Order for DME - ONLY FOR " DME        We discussed the infection and avoid trauma. Start Keflex at 500 mg three daily. Call if any side effects.   Use the Alumafoam splint as discussed. If not better then you may need a partial nail avulsion. You may soak this in warm, soapy water for 5 minutes,  2-3 times a day for 3 days.       Cassius Barriga MD

## 2020-05-15 NOTE — PATIENT INSTRUCTIONS
Thank you for choosing [facility].  You may be receiving an email and/or telephone survey request from [facility] Customer Experience regarding your visit today.  Please take a few minutes to respond to the survey to let us know how we are doing.      If you have questions or concerns, please contact us via [employer]AP[employer] or you can contact your care team at [phone].    Our Clinic hours are:  Monday 6:40 am  to 7:00 pm  Tuesday -Friday 6:40 am to 5:00 pm    The [place] outpatient lab hours are:  Monday - Friday 6:10 am to 4:45 pm  Saturdays 7:00 am to 11:00 am  Appointments are required, call [phone]    If you have clinical questions after hours or would like to schedule an appointment,  call the clinic at [phone].    (L08.9) Finger infection  (primary encounter diagnosis)  Comment:   Plan: cephALEXin (KEFLEX) 500 MG capsule,         Miscellaneous Order for DME - ONLY FOR DME        We discussed the infection and avoid trauma. Start Keflex at 500 mg three daily. Call if any side effects.   Use the Alumafoam splint as discussed. If not better then you may need a partial nail avulsion. You may soak this in warm, soapy water for 5 minutes,  2-3 times a day for 3 days.

## 2020-06-01 ENCOUNTER — TELEPHONE (OUTPATIENT)
Dept: FAMILY MEDICINE | Facility: CLINIC | Age: 55
End: 2020-06-01

## 2020-06-01 DIAGNOSIS — K40.90 NON-RECURRENT UNILATERAL INGUINAL HERNIA WITHOUT OBSTRUCTION OR GANGRENE: Primary | ICD-10-CM

## 2020-06-01 NOTE — TELEPHONE ENCOUNTER
Reason for call:    Symptom or request:     Patient called stating that he has a hernia, and now has been furlough from his job, now requesting referral to surgery for hernia repair.       Best Time:  any    Can we leave a detailed message on this number?  YES     Camille AMANDA  Station      < 4,000 OR > 12,000

## 2020-06-03 ENCOUNTER — OFFICE VISIT (OUTPATIENT)
Dept: SURGERY | Facility: CLINIC | Age: 55
End: 2020-06-03
Attending: FAMILY MEDICINE
Payer: COMMERCIAL

## 2020-06-03 VITALS
SYSTOLIC BLOOD PRESSURE: 142 MMHG | BODY MASS INDEX: 33.95 KG/M2 | DIASTOLIC BLOOD PRESSURE: 87 MMHG | WEIGHT: 224 LBS | HEIGHT: 68 IN | TEMPERATURE: 97.4 F | HEART RATE: 64 BPM

## 2020-06-03 DIAGNOSIS — K40.20 NON-RECURRENT BILATERAL INGUINAL HERNIA WITHOUT OBSTRUCTION OR GANGRENE: Primary | ICD-10-CM

## 2020-06-03 DIAGNOSIS — K40.90 NON-RECURRENT UNILATERAL INGUINAL HERNIA WITHOUT OBSTRUCTION OR GANGRENE: ICD-10-CM

## 2020-06-03 DIAGNOSIS — Z11.59 ENCOUNTER FOR SCREENING FOR OTHER VIRAL DISEASES: Primary | ICD-10-CM

## 2020-06-03 PROCEDURE — 99204 OFFICE O/P NEW MOD 45 MIN: CPT | Performed by: SURGERY

## 2020-06-03 ASSESSMENT — MIFFLIN-ST. JEOR: SCORE: 1831.59

## 2020-06-03 NOTE — NURSING NOTE
"Initial BP (!) 151/94 (BP Location: Right arm, Patient Position: Sitting, Cuff Size: Adult Large)   Pulse 65   Temp 97.4  F (36.3  C) (Tympanic)   Ht 1.721 m (5' 7.75\")   Wt 101.6 kg (224 lb)   BMI 34.31 kg/m   Estimated body mass index is 34.31 kg/m  as calculated from the following:    Height as of this encounter: 1.721 m (5' 7.75\").    Weight as of this encounter: 101.6 kg (224 lb). .    Marlen Jeffers MA    "

## 2020-06-03 NOTE — PROGRESS NOTES
54-year-old male complaining of left groin pain and mass for the past several years.  Patient does occasional heavy lifting at work and reports that this is been enlarging slowly over time.  He currently is in a wall in his work and would like to get it fixed now rather than later.  Because of localized discomfort 1 out of 10 without radiation.  Aggravated by straining and alleviated by rest.  No other associated symptoms.    Patient Active Problem List   Diagnosis     Screening for hypertension     Family history of colon cancer     Family history of aneurysm     HYPERLIPIDEMIA LDL GOAL <130     Hypothyroidism, unspecified type     Bladder spasms     Non-recurrent unilateral inguinal hernia without obstruction or gangrene       Past Medical History:   Diagnosis Date     Other and unspecified hyperlipidemia may 2006     Sprain of lumbar region      Unspecified hypothyroidism        History reviewed. No pertinent surgical history.    Family History   Problem Relation Age of Onset     Arthritis Mother      Thyroid Disease Mother      Lipids Mother      Hypertension Father      Allergies Son      Neurologic Disorder Son         taking concerta     Connective Tissue Disorder Maternal Grandfather      Hypertension Brother      Obesity Brother      Lipids Brother      Cardiovascular Brother         ansyerusm       Social History     Tobacco Use     Smoking status: Never Smoker     Smokeless tobacco: Never Used   Substance Use Topics     Alcohol use: No        History   Drug Use No       Current Outpatient Medications   Medication Sig Dispense Refill     cyclobenzaprine (FLEXERIL) 10 MG tablet Take 1 tablet (10 mg) by mouth 3 times daily as needed for muscle spasms 15 tablet 0     levothyroxine (SYNTHROID/LEVOTHROID) 150 MCG tablet Take 2 tablets (300 mcg) by mouth daily 180 tablet 3     Melatonin (MELATONIN TR) 10 MG TBCR Take 1 tablet by mouth At Bedtime       oxybutynin ER (DITROPAN-XL) 10 MG 24 hr tablet Take 1 tablet  "(10 mg) by mouth daily 90 tablet 3     pravastatin (PRAVACHOL) 40 MG tablet Take 1 tablet (40 mg) by mouth daily 90 tablet 3       No Known Allergies    ROS  Constitutional - Denies fevers, weight loss, malaise, lethargy  Neuro - Denies tremors or seizures  Pulmon - Denies SOB, dyspnea, hemoptysis, chronic cough or use of an inhaler  CV - Denies CP, SOB, lower extremity edema, difficulty w/ stairs, has never used NTG  GI - Denies hematemesis, BRBPR, melena, chronic diarrhea or epigastric pain   - Denies hematuria, difficulty voiding, h/o STDs  Hematology - Denies blood clotting disorders, chronic anemias  Dermatology - No melanomas or skin cancers  Rheumatology - No h/o RA  Pysch - Denies depression, bipolar d/o or schizophrenia    Exam:BP (!) 151/94 (BP Location: Right arm, Patient Position: Sitting, Cuff Size: Adult Large)   Pulse 65   Temp 97.4  F (36.3  C) (Tympanic)   Ht 1.721 m (5' 7.75\")   Wt 101.6 kg (224 lb)   BMI 34.31 kg/m  '    General - Alert and Oriented X4, NAD, well nourished  HEENT - Normocephalic, atraumatic, PERRLA, Nose midline, Throat without lesions  Neck - supple, no LAD, Thyroid normal, Carotids without bruits  Lungs - Clear to auscultation bilaterally with good inspiratory effort, no tactile fremitus  CV - Heart RRR, no lift's, thrills, murmurs, rubs, or gallops. Carotid, radial, and femoral pulses 2+ bilaterally  Abdomen - Soft, non-tender, +BS, no hepatosplenomegaly, no palpable masses  Groins - 2+ pulses bilaterally and no LAD, palpable small reducible mass on left, palpable large reducible mass on the right  Neuro - Full ROM, Strength 5/5 and major muscle groups, sensation intact  Extremities - No cyanosis, clubbing or edema    Assessment and plan: 54-year-old male with bilateral inguinal hernias.  Patient is a good candidate for laparoscopic repair.  Risks benefits alternatives and complications were discussed with the patient including the possibility of infection bleeding or " hernia recurrence.  Patient understood and wished to proceed. PATIENT IS CLEARED FOR SURGERY.    Jd Wheatley MD

## 2020-06-03 NOTE — LETTER
6/3/2020         RE: Micah Lay  [address]  [facility] [place] [address]        Dear Colleague,    Thank you for referring your patient, Micah Lay, to the [facility]. Please see a copy of my visit note below.    54-year-old male complaining of left groin pain and mass for the past several years.  Patient does occasional heavy lifting at work and reports that this is been enlarging slowly over time.  He currently is in a wall in his work and would like to get it fixed now rather than later.  Because of localized discomfort 1 out of 10 without radiation.  Aggravated by straining and alleviated by rest.  No other associated symptoms.    Patient Active Problem List   Diagnosis     Screening for hypertension     Family history of colon cancer     Family history of aneurysm     HYPERLIPIDEMIA LDL GOAL <130     Hypothyroidism, unspecified type     Bladder spasms     Non-recurrent unilateral inguinal hernia without obstruction or gangrene       Past Medical History:   Diagnosis Date     Other and unspecified hyperlipidemia may 2006     Sprain of lumbar region      Unspecified hypothyroidism        History reviewed. No pertinent surgical history.    Family History   Problem Relation Age of Onset     Arthritis Mother      Thyroid Disease Mother      Lipids Mother      Hypertension Father      Allergies Son      Neurologic Disorder Son         taking concerta     Connective Tissue Disorder Maternal Grandfather      Hypertension Brother      Obesity Brother      Lipids Brother      Cardiovascular Brother         ansyerusm       Social History     Tobacco Use     Smoking status: Never Smoker     Smokeless tobacco: Never Used   Substance Use Topics     Alcohol use: No        History   Drug Use No       Current Outpatient Medications   Medication Sig Dispense Refill     cyclobenzaprine (FLEXERIL) 10 MG tablet Take 1 tablet (10 mg) by mouth 3 times daily as needed for muscle spasms 15 tablet 0      "levothyroxine (SYNTHROID/LEVOTHROID) 150 MCG tablet Take 2 tablets (300 mcg) by mouth daily 180 tablet 3     Melatonin (MELATONIN TR) 10 MG TBCR Take 1 tablet by mouth At Bedtime       oxybutynin ER (DITROPAN-XL) 10 MG 24 hr tablet Take 1 tablet (10 mg) by mouth daily 90 tablet 3     pravastatin (PRAVACHOL) 40 MG tablet Take 1 tablet (40 mg) by mouth daily 90 tablet 3       No Known Allergies    ROS  Constitutional - Denies fevers, weight loss, malaise, lethargy  Neuro - Denies tremors or seizures  Pulmon - Denies SOB, dyspnea, hemoptysis, chronic cough or use of an inhaler  CV - Denies CP, SOB, lower extremity edema, difficulty w/ stairs, has never used NTG  GI - Denies hematemesis, BRBPR, melena, chronic diarrhea or epigastric pain   - Denies hematuria, difficulty voiding, h/o STDs  Hematology - Denies blood clotting disorders, chronic anemias  Dermatology - No melanomas or skin cancers  Rheumatology - No h/o RA  Pysch - Denies depression, bipolar d/o or schizophrenia    Exam:BP (!) 151/94 (BP Location: Right arm, Patient Position: Sitting, Cuff Size: Adult Large)   Pulse 65   Temp 97.4  F (36.3  C) (Tympanic)   Ht 1.721 m (5' 7.75\")   Wt 101.6 kg (224 lb)   BMI 34.31 kg/m  '    General - Alert and Oriented X4, NAD, well nourished  HEENT - Normocephalic, atraumatic, PERRLA, Nose midline, Throat without lesions  Neck - supple, no LAD, Thyroid normal, Carotids without bruits  Lungs - Clear to auscultation bilaterally with good inspiratory effort, no tactile fremitus  CV - Heart RRR, no lift's, thrills, murmurs, rubs, or gallops. Carotid, radial, and femoral pulses 2+ bilaterally  Abdomen - Soft, non-tender, +BS, no hepatosplenomegaly, no palpable masses  Groins - 2+ pulses bilaterally and no LAD, palpable small reducible mass on left, palpable large reducible mass on the right  Neuro - Full ROM, Strength 5/5 and major muscle groups, sensation intact  Extremities - No cyanosis, clubbing or edema    Assessment " and plan: 54-year-old male with bilateral inguinal hernias.  Patient is a good candidate for laparoscopic repair.  Risks benefits alternatives and complications were discussed with the patient including the possibility of infection bleeding or hernia recurrence.  Patient understood and wished to proceed. PATIENT IS CLEARED FOR SURGERY.  Jd Wheatley MD       Again, thank you for allowing me to participate in the care of your patient.        Sincerely,        Jd Wheatley MD

## 2020-06-04 ENCOUNTER — TELEPHONE (OUTPATIENT)
Dept: SURGERY | Facility: CLINIC | Age: 55
End: 2020-06-04

## 2020-06-04 NOTE — TELEPHONE ENCOUNTER
Pt called back - states this will be under personal family insurance.    -Viry Santiagoon  [facility]

## 2020-06-04 NOTE — TELEPHONE ENCOUNTER
JEANNETCB  Pt is scheduled for surgery on 6/12. Kelton in pt financial is needing to know if this will be under his personal family ins or if this should be under a work comp claim? If it is work comp he will need to provide us w/ work comp info from employer. However this looks to be an on going issue.     Brigette Leigh  Specialty CSS

## 2020-06-08 ENCOUNTER — TELEPHONE (OUTPATIENT)
Dept: FAMILY MEDICINE | Facility: CLINIC | Age: 55
End: 2020-06-08

## 2020-06-08 NOTE — TELEPHONE ENCOUNTER
Patient having hernia repair on 06/12/20. Asking what medications/supplements he can or cannot take.     It appears Dr Wheatley cleared patient for surgery on 06/03/20.     Anna SAENZ BSN, RN

## 2020-06-08 NOTE — TELEPHONE ENCOUNTER
Reason for Call:  Other call back    Detailed comments: Patient is calling asking what he can and can't have for supplements and medications.    Phone Number Patient can be reached at: Home number on file [phone] (home)    Best Time: any    Can we leave a detailed message on this number? YES    Call taken on 6/8/2020 at 3:22 PM by Brittany Tesfaye

## 2020-06-09 RX ORDER — VITAMIN B COMPLEX
TABLET ORAL DAILY
COMMUNITY
End: 2021-12-31

## 2020-06-09 NOTE — TELEPHONE ENCOUNTER
Covering for primary/ordering provider    There are no vitamins on his list except for melatonin.  Since he did not have a formal preop evaluation with the primary care clinic, would defer to surgery to approve continuing any over-the-counter medications

## 2020-06-10 ENCOUNTER — ANESTHESIA EVENT (OUTPATIENT)
Dept: SURGERY | Facility: CLINIC | Age: 55
End: 2020-06-10
Payer: COMMERCIAL

## 2020-06-10 DIAGNOSIS — Z11.59 ENCOUNTER FOR SCREENING FOR OTHER VIRAL DISEASES: ICD-10-CM

## 2020-06-10 LAB
SARS-COV-2 RNA SPEC QL NAA+PROBE: NOT DETECTED
SPECIMEN SOURCE: NORMAL

## 2020-06-10 PROCEDURE — 99000 SPECIMEN HANDLING OFFICE-LAB: CPT | Performed by: SURGERY

## 2020-06-10 PROCEDURE — 99207 ZZC NO CHARGE NURSE ONLY: CPT

## 2020-06-10 PROCEDURE — U0003 INFECTIOUS AGENT DETECTION BY NUCLEIC ACID (DNA OR RNA); SEVERE ACUTE RESPIRATORY SYNDROME CORONAVIRUS 2 (SARS-COV-2) (CORONAVIRUS DISEASE [COVID-19]), AMPLIFIED PROBE TECHNIQUE, MAKING USE OF HIGH THROUGHPUT TECHNOLOGIES AS DESCRIBED BY CMS-2020-01-R: HCPCS | Mod: 90 | Performed by: SURGERY

## 2020-06-12 ENCOUNTER — HOSPITAL ENCOUNTER (OUTPATIENT)
Facility: CLINIC | Age: 55
Discharge: HOME | End: 2020-06-12
Attending: SURGERY | Admitting: SURGERY
Payer: COMMERCIAL

## 2020-06-12 ENCOUNTER — ANESTHESIA (OUTPATIENT)
Dept: SURGERY | Facility: CLINIC | Age: 55
End: 2020-06-12
Payer: COMMERCIAL

## 2020-06-12 VITALS
SYSTOLIC BLOOD PRESSURE: 124 MMHG | TEMPERATURE: 98.1 F | OXYGEN SATURATION: 95 % | RESPIRATION RATE: 14 BRPM | HEART RATE: 69 BPM | DIASTOLIC BLOOD PRESSURE: 77 MMHG

## 2020-06-12 DIAGNOSIS — K40.90 NON-RECURRENT UNILATERAL INGUINAL HERNIA WITHOUT OBSTRUCTION OR GANGRENE: ICD-10-CM

## 2020-06-12 DIAGNOSIS — G89.18 POSTOPERATIVE PAIN: Primary | ICD-10-CM

## 2020-06-12 PROCEDURE — 37000009 ZZH ANESTHESIA TECHNICAL FEE, EACH ADDTL 15 MIN: Performed by: SURGERY

## 2020-06-12 PROCEDURE — 36000056 ZZH SURGERY LEVEL 3 1ST 30 MIN: Performed by: SURGERY

## 2020-06-12 PROCEDURE — 25000128 H RX IP 250 OP 636: Performed by: NURSE ANESTHETIST, CERTIFIED REGISTERED

## 2020-06-12 PROCEDURE — 25000128 H RX IP 250 OP 636: Performed by: SURGERY

## 2020-06-12 PROCEDURE — 36000058 ZZH SURGERY LEVEL 3 EA 15 ADDTL MIN: Performed by: SURGERY

## 2020-06-12 PROCEDURE — 71000027 ZZH RECOVERY PHASE 2 EACH 15 MINS: Performed by: SURGERY

## 2020-06-12 PROCEDURE — 25000132 ZZH RX MED GY IP 250 OP 250 PS 637: Performed by: NURSE ANESTHETIST, CERTIFIED REGISTERED

## 2020-06-12 PROCEDURE — 25000132 ZZH RX MED GY IP 250 OP 250 PS 637: Performed by: SURGERY

## 2020-06-12 PROCEDURE — 49650 LAP ING HERNIA REPAIR INIT: CPT | Mod: 50 | Performed by: PHYSICIAN ASSISTANT

## 2020-06-12 PROCEDURE — 27110028 ZZH OR GENERAL SUPPLY NON-STERILE: Performed by: SURGERY

## 2020-06-12 PROCEDURE — 71000012 ZZH RECOVERY PHASE 1 LEVEL 1 FIRST HR: Performed by: SURGERY

## 2020-06-12 PROCEDURE — 25800030 ZZH RX IP 258 OP 636: Performed by: NURSE ANESTHETIST, CERTIFIED REGISTERED

## 2020-06-12 PROCEDURE — 25000125 ZZHC RX 250: Performed by: SURGERY

## 2020-06-12 PROCEDURE — 25000566 ZZH SEVOFLURANE, EA 15 MIN: Performed by: SURGERY

## 2020-06-12 PROCEDURE — 88302 TISSUE EXAM BY PATHOLOGIST: CPT | Mod: 26 | Performed by: SURGERY

## 2020-06-12 PROCEDURE — 25000125 ZZHC RX 250: Performed by: NURSE ANESTHETIST, CERTIFIED REGISTERED

## 2020-06-12 PROCEDURE — 49650 LAP ING HERNIA REPAIR INIT: CPT | Mod: 50 | Performed by: SURGERY

## 2020-06-12 PROCEDURE — C1781 MESH (IMPLANTABLE): HCPCS | Performed by: SURGERY

## 2020-06-12 PROCEDURE — 40000305 ZZH STATISTIC PRE PROC ASSESS I: Performed by: SURGERY

## 2020-06-12 PROCEDURE — 88302 TISSUE EXAM BY PATHOLOGIST: CPT | Performed by: SURGERY

## 2020-06-12 PROCEDURE — 27210794 ZZH OR GENERAL SUPPLY STERILE: Performed by: SURGERY

## 2020-06-12 PROCEDURE — 37000008 ZZH ANESTHESIA TECHNICAL FEE, 1ST 30 MIN: Performed by: SURGERY

## 2020-06-12 DEVICE — MESH 3DMAX RIGHT LG 0115321: Type: IMPLANTABLE DEVICE | Site: GROIN | Status: FUNCTIONAL

## 2020-06-12 DEVICE — MESH 3DMAX LEFT LG 0115311: Type: IMPLANTABLE DEVICE | Site: GROIN | Status: FUNCTIONAL

## 2020-06-12 RX ORDER — FENTANYL CITRATE 50 UG/ML
INJECTION, SOLUTION INTRAMUSCULAR; INTRAVENOUS PRN
Status: DISCONTINUED | OUTPATIENT
Start: 2020-06-12 | End: 2020-06-12

## 2020-06-12 RX ORDER — NALOXONE HYDROCHLORIDE 0.4 MG/ML
.1-.4 INJECTION, SOLUTION INTRAMUSCULAR; INTRAVENOUS; SUBCUTANEOUS
Status: DISCONTINUED | OUTPATIENT
Start: 2020-06-12 | End: 2020-06-12 | Stop reason: HOSPADM

## 2020-06-12 RX ORDER — MEPERIDINE HYDROCHLORIDE 25 MG/ML
12.5 INJECTION INTRAMUSCULAR; INTRAVENOUS; SUBCUTANEOUS
Status: DISCONTINUED | OUTPATIENT
Start: 2020-06-12 | End: 2020-06-12 | Stop reason: HOSPADM

## 2020-06-12 RX ORDER — GABAPENTIN 300 MG/1
300 CAPSULE ORAL ONCE
Status: COMPLETED | OUTPATIENT
Start: 2020-06-12 | End: 2020-06-12

## 2020-06-12 RX ORDER — SODIUM CHLORIDE, SODIUM LACTATE, POTASSIUM CHLORIDE, CALCIUM CHLORIDE 600; 310; 30; 20 MG/100ML; MG/100ML; MG/100ML; MG/100ML
INJECTION, SOLUTION INTRAVENOUS CONTINUOUS
Status: DISCONTINUED | OUTPATIENT
Start: 2020-06-12 | End: 2020-06-12 | Stop reason: HOSPADM

## 2020-06-12 RX ORDER — HYDROCODONE BITARTRATE AND ACETAMINOPHEN 5; 325 MG/1; MG/1
1-2 TABLET ORAL EVERY 6 HOURS PRN
Qty: 20 TABLET | Refills: 0 | Status: SHIPPED | OUTPATIENT
Start: 2020-06-12 | End: 2020-06-15

## 2020-06-12 RX ORDER — ONDANSETRON 2 MG/ML
INJECTION INTRAMUSCULAR; INTRAVENOUS PRN
Status: DISCONTINUED | OUTPATIENT
Start: 2020-06-12 | End: 2020-06-12

## 2020-06-12 RX ORDER — CEFAZOLIN SODIUM 2 G/100ML
2 INJECTION, SOLUTION INTRAVENOUS
Status: COMPLETED | OUTPATIENT
Start: 2020-06-12 | End: 2020-06-12

## 2020-06-12 RX ORDER — HYDROCODONE BITARTRATE AND ACETAMINOPHEN 5; 325 MG/1; MG/1
1-2 TABLET ORAL EVERY 4 HOURS PRN
Status: DISCONTINUED | OUTPATIENT
Start: 2020-06-12 | End: 2020-06-12 | Stop reason: HOSPADM

## 2020-06-12 RX ORDER — FENTANYL CITRATE 50 UG/ML
25-50 INJECTION, SOLUTION INTRAMUSCULAR; INTRAVENOUS
Status: DISCONTINUED | OUTPATIENT
Start: 2020-06-12 | End: 2020-06-12 | Stop reason: HOSPADM

## 2020-06-12 RX ORDER — CEFAZOLIN SODIUM 1 G/50ML
1 INJECTION, SOLUTION INTRAVENOUS SEE ADMIN INSTRUCTIONS
Status: DISCONTINUED | OUTPATIENT
Start: 2020-06-12 | End: 2020-06-12 | Stop reason: HOSPADM

## 2020-06-12 RX ORDER — ONDANSETRON 4 MG/1
4 TABLET, ORALLY DISINTEGRATING ORAL EVERY 30 MIN PRN
Status: DISCONTINUED | OUTPATIENT
Start: 2020-06-12 | End: 2020-06-12 | Stop reason: HOSPADM

## 2020-06-12 RX ORDER — LIDOCAINE 40 MG/G
CREAM TOPICAL
Status: DISCONTINUED | OUTPATIENT
Start: 2020-06-12 | End: 2020-06-12 | Stop reason: HOSPADM

## 2020-06-12 RX ORDER — ACETAMINOPHEN 325 MG/1
975 TABLET ORAL ONCE
Status: COMPLETED | OUTPATIENT
Start: 2020-06-12 | End: 2020-06-12

## 2020-06-12 RX ORDER — ONDANSETRON 2 MG/ML
4 INJECTION INTRAMUSCULAR; INTRAVENOUS EVERY 30 MIN PRN
Status: DISCONTINUED | OUTPATIENT
Start: 2020-06-12 | End: 2020-06-12 | Stop reason: HOSPADM

## 2020-06-12 RX ORDER — BUPIVACAINE HYDROCHLORIDE AND EPINEPHRINE 5; 5 MG/ML; UG/ML
INJECTION, SOLUTION PERINEURAL PRN
Status: DISCONTINUED | OUTPATIENT
Start: 2020-06-12 | End: 2020-06-12 | Stop reason: HOSPADM

## 2020-06-12 RX ORDER — CELECOXIB 200 MG/1
200 CAPSULE ORAL ONCE
Status: COMPLETED | OUTPATIENT
Start: 2020-06-12 | End: 2020-06-12

## 2020-06-12 RX ORDER — PROPOFOL 10 MG/ML
INJECTION, EMULSION INTRAVENOUS PRN
Status: DISCONTINUED | OUTPATIENT
Start: 2020-06-12 | End: 2020-06-12

## 2020-06-12 RX ORDER — DEXAMETHASONE SODIUM PHOSPHATE 4 MG/ML
INJECTION, SOLUTION INTRA-ARTICULAR; INTRALESIONAL; INTRAMUSCULAR; INTRAVENOUS; SOFT TISSUE PRN
Status: DISCONTINUED | OUTPATIENT
Start: 2020-06-12 | End: 2020-06-12

## 2020-06-12 RX ADMIN — LIDOCAINE HYDROCHLORIDE 50 MG: 10 INJECTION, SOLUTION EPIDURAL; INFILTRATION; INTRACAUDAL; PERINEURAL at 10:02

## 2020-06-12 RX ADMIN — GABAPENTIN 300 MG: 300 CAPSULE ORAL at 09:39

## 2020-06-12 RX ADMIN — FENTANYL CITRATE 50 MCG: 50 INJECTION, SOLUTION INTRAMUSCULAR; INTRAVENOUS at 10:21

## 2020-06-12 RX ADMIN — ONDANSETRON 4 MG: 2 INJECTION INTRAMUSCULAR; INTRAVENOUS at 10:02

## 2020-06-12 RX ADMIN — FENTANYL CITRATE 50 MCG: 50 INJECTION, SOLUTION INTRAMUSCULAR; INTRAVENOUS at 10:07

## 2020-06-12 RX ADMIN — SODIUM CHLORIDE, POTASSIUM CHLORIDE, SODIUM LACTATE AND CALCIUM CHLORIDE: 600; 310; 30; 20 INJECTION, SOLUTION INTRAVENOUS at 09:51

## 2020-06-12 RX ADMIN — DEXAMETHASONE SODIUM PHOSPHATE 4 MG: 4 INJECTION, SOLUTION INTRA-ARTICULAR; INTRALESIONAL; INTRAMUSCULAR; INTRAVENOUS; SOFT TISSUE at 10:02

## 2020-06-12 RX ADMIN — PROPOFOL 200 MG: 10 INJECTION, EMULSION INTRAVENOUS at 10:02

## 2020-06-12 RX ADMIN — ACETAMINOPHEN 975 MG: 325 TABLET, FILM COATED ORAL at 09:39

## 2020-06-12 RX ADMIN — CEFAZOLIN SODIUM 2 G: 2 INJECTION, SOLUTION INTRAVENOUS at 09:58

## 2020-06-12 RX ADMIN — CELECOXIB 200 MG: 200 CAPSULE ORAL at 09:39

## 2020-06-12 RX ADMIN — FENTANYL CITRATE 150 MCG: 50 INJECTION, SOLUTION INTRAMUSCULAR; INTRAVENOUS at 10:02

## 2020-06-12 RX ADMIN — ROCURONIUM BROMIDE 50 MG: 10 INJECTION INTRAVENOUS at 10:02

## 2020-06-12 RX ADMIN — MIDAZOLAM 2 MG: 1 INJECTION INTRAMUSCULAR; INTRAVENOUS at 09:59

## 2020-06-12 RX ADMIN — HYDROCODONE BITARTRATE AND ACETAMINOPHEN 2 TABLET: 5; 325 TABLET ORAL at 12:11

## 2020-06-12 RX ADMIN — SUGAMMADEX 200 MG: 100 INJECTION, SOLUTION INTRAVENOUS at 10:47

## 2020-06-12 RX ADMIN — FENTANYL CITRATE 50 MCG: 50 INJECTION, SOLUTION INTRAMUSCULAR; INTRAVENOUS at 12:10

## 2020-06-12 NOTE — DISCHARGE INSTRUCTIONS
DISCHARGE INSTRUCTIONS     1. You may resume your regular diet when you feel you are ready    2.  No lifting restrictions.  Okay to lift as pain allows.    3.  Bruising and swelling will be most evident on postoperative day #3, this corresponds to Monday.  Do not be surprised if you see swelling and bruising.  This is benign and always goes away but it can be quite shocking if you are not expecting it.    4. Some bruising and mild swelling is normal after surgery. The area below and around the incision(s) may be hard and elevated. This is part of the normal healing process. This will resolve slowly over the next several months.     5. Your wounds are covered with glue. The glue is water tight and so you can shower or bathe immediately following surgery.     6. Use the following medications (in addition to your normal meds) as shown:      Tylenol (acetaminophen) 500 mg every 6 hours as needed for pain.    Do not take more than 1000 mg of Tylenol every 6 hours -OR- 4g in a day    Motrin (ibuprophen) 600 mg every 6 hours as needed for pain. Take with food.     8. Notify Clinic at [phone] if:     Your discomfort is not relieved by your pain medication     You have signs of infection such as temperature above 100.4 degrees orally,  chills, or increasing daily discomfort.     Incision site is becoming more red and/or there is purulent drainage.      9. Follow up with Dr Wheatley in 1-2 weeks.                        Same Day Surgery Discharge Instructions  Special Precautions After Surgery - Adult    1. It is not unusual to feel lightheaded or faint, up to 24 hours after surgery or while taking pain medication.  If you have these symptoms; sit for a few minutes before standing and have someone assist you when getting up.  2. You should rest and relax for the next 24 hours and must have someone stay with you for at least 24 hours after your discharge.  3. DO NOT DRIVE any vehicle or operate mechanical equipment for 24  hours following the end of your surgery.  DO NOT DRIVE while taking narcotic pain medications that have been prescribed by your physician.  If you had a limb operated on, you must be able to use it fully to drive.  4. DO NOT drink alcoholic beverages for 24 hours following surgery or while taking prescription pain medication.  5. Drink clear liquids (apple juice, ginger ale, broth, 7-Up, etc.).  Progress to your regular diet as you feel able.  6. Any questions call your physician and do not make important decisions for 24 hours.    ACTIVITY  ? Rest today.  No activity or diet restrictions.  ? Resume activity as tolerated.  ? Restrictions: per Dr Wheatley     INCISIONAL CARE  ? Apply ice 1/2 hour on and 1/2 hour off while awake.  ? Be alert for signs of infection:  redness, swelling, heat, drainage of pus, and/or elevated temperature.  Contact your doctor if these occur.       Medications:  ? Hydrocodone (Norco, Vicodin):  last dose: 1200.  ? Ibuprofen (Motrin, Advil):  Next dose: 10 pm tonight.  ? Stool softeners to prevent constipation.  ? Follow the instructions on the bottle.  __________________________________________________________________________________________________________________________________  IMPORTANT NUMBERS:    [facility] Main Number:  [phone], [phone]  Pharmacy:  [phone]  Same Day Surgery:  [phone], Monday - Friday until 8:30 p.m.  Urgent Care:  [phone]  Emergency Room:  [phone]      [facility] Clinic:  [phone]

## 2020-06-12 NOTE — ANESTHESIA PREPROCEDURE EVALUATION
Anesthesia Pre-Procedure Evaluation    Patient: Micah Lay   MRN: [id number] : 1965          Preoperative Diagnosis: Non-recurrent unilateral inguinal hernia without obstruction or gangrene [K40.90]    Procedure(s):  Laparoscopic bilateral inguinal hernia repair    Past Medical History:   Diagnosis Date     Other and unspecified hyperlipidemia may 2006     Sprain of lumbar region      Unspecified hypothyroidism      History reviewed. No pertinent surgical history.    Anesthesia Evaluation     . Pt has had prior anesthetic. Type: General    No history of anesthetic complications          ROS/MED HX    ENT/Pulmonary:  - neg pulmonary ROS     Neurologic:  - neg neurologic ROS     Cardiovascular:     (+) Dyslipidemia, hypertension----. : . . . :. .       METS/Exercise Tolerance:  >4 METS   Hematologic:  - neg hematologic  ROS       Musculoskeletal:  - neg musculoskeletal ROS       GI/Hepatic: Comment: bilat inguinal hernia        Renal/Genitourinary:  - ROS Renal section negative       Endo:     (+) thyroid problem hypothyroidism, .      Psychiatric:  - neg psychiatric ROS       Infectious Disease:  - neg infectious disease ROS       Malignancy:      - no malignancy   Other:    - neg other ROS                      Physical Exam  Normal systems: cardiovascular, pulmonary and dental    Airway   Mallampati: III  TM distance: >3 FB    Dental     Cardiovascular   Rhythm and rate: regular and normal      Pulmonary    breath sounds clear to auscultation            Lab Results   Component Value Date    WBC 6.7 2012    HGB 14.7 2015    HCT 42.9 2012     2012     2014    POTASSIUM 4.1 2014    CHLORIDE 107 2014    CO2 24 2014    BUN 25 (H) 2014    CR 0.91 2014    GLC 81 2016    MIRIAM 9.0 2014    ALBUMIN 4.6 2012    PROTTOTAL 7.9 2012    ALT 37 2017    AST 23 2015    ALKPHOS 90 2012    BILITOTAL 0.4  "09/19/2012    TSH 0.37 (L) 01/06/2020    T4 1.35 01/06/2020       Preop Vitals  BP Readings from Last 3 Encounters:   06/03/20 (!) 142/87   05/15/20 130/78   01/13/20 (!) 144/84    Pulse Readings from Last 3 Encounters:   06/03/20 64   05/15/20 78   01/13/20 69      Resp Readings from Last 3 Encounters:   05/15/20 16   01/13/20 14   01/06/20 16    SpO2 Readings from Last 3 Encounters:   05/15/20 95%   01/13/20 99%   01/06/20 97%      Temp Readings from Last 1 Encounters:   06/03/20 36.3  C (97.4  F) (Tympanic)    Ht Readings from Last 1 Encounters:   06/03/20 1.721 m (5' 7.75\")      Wt Readings from Last 1 Encounters:   06/03/20 101.6 kg (224 lb)    Estimated body mass index is 34.31 kg/m  as calculated from the following:    Height as of 6/3/20: 1.721 m (5' 7.75\").    Weight as of 6/3/20: 101.6 kg (224 lb).       Anesthesia Plan      History & Physical Review  History and physical reviewed and following examination; no interval change.    ASA Status:  2 .    NPO Status:  > 8 hours    Plan for General with Intravenous and Propofol induction. Maintenance will be Balanced.    PONV prophylaxis:  Ondansetron (or other 5HT-3) and Dexamethasone or Solumedrol  Additional equipment: Videolaryngoscope        Postoperative Care  Postoperative pain management:  IV analgesics and Oral pain medications.      Consents  Anesthetic plan, risks, benefits and alternatives discussed with:  Patient..                 Artemio Cai CRNA, APRN CRNA  "

## 2020-06-12 NOTE — OP NOTE
Preop diagnosis: Bilateral inguinal hernias    Postop diagnosis: Same    Procedure: Laparoscopic bilateral inguinal hernia repair    Surgeon: Dioni    Assistant surgeon: Keagan Erazo PA-C (needed for expertise in camera operation retraction hemostasis wound closure and suctioning)    Anesthesia: General endotracheal Hiltner CRNA    Procedure: Patient's lower abdomen was clipped of extraneous hair and cleaned and draped in a sterile manner.  1/4% Marcaine with epinephrine was used to anesthetize all port sites.  Small subumbilical curvilinear incision made and subcutaneous tissues dissected to fascia.  Anterior fascia of right rectus muscle was opened revealing muscle beneath.  This was pulled laterally revealing posterior fascia.  Dissecting balloon trocar inserted into preperitoneal space and insufflated under direct vision.  This was removed and 10 mm trocar placed into preperitoneal space.  Carbon dioxide insufflated to 15 mmHg and patient placed into Trendelenburg position.  2 additional midline 5 mm trochars were also placed.  Attention was turned to the midline.  The loose areolar tissue covering the pubic arch was  revealing the bony structure.  Patient had quite a bit of preperitoneal fat.  This dissection continued laterally to the right pelvic sidewall musculature and then down to the psoas muscle.  The hernia which was directly through Hesselbach's triangle and contained the dome of the bladder.  It was removed from the triangle and swept to the bottom of the operative field.  Small amount of residual fat was removed from the triangle as well.  A very small indirect defect was also noted and the hernia sac swept from the cord structures.  A piece of Bard 3D max mesh was then used for repair.  It was tacked medially to the pubic tubercle and anteriorly across the posterior rectus muscle completely covering Hesselbach's triangle.  The inferior margin mesh was tacked to Tay's ligament to further  secure this below the triangle.  The inferior margin of mesh was also tucked under the parietal peritoneum.  When complete, the entire triangle was completely covered as well as the small indirect defect.  Attention was then turned to the left side.  In a similar fashion the loose tissue was  to the left pelvic sidewall musculature and then down to the psoas muscle.  The indirect sac was removed from the inguinal canal along with several cord lipomas.  It was all swept to the bottom of the operative field.  A second piece of 3D max mesh was used for repair here.  It was tacked medially overlapping the right-sided mesh at midline.  Several tacks were placed along Tay's ligament and anteriorly across the posterior rectus muscle.  Again the inferior margin of mesh tucked easily under the parietal peritoneum and the indirect defect was completely covered.  Finally all trochars were removed under direct vision and the air allowed to desufflate.  The fascial defect of the subumbilical port was closed using an 0 Vicryl suture in a figure-of-eight fashion and this was bolstered with a second 0 Vicryl on top of that and reinjected with Marcaine.  All wounds were irrigated with normal saline and skin closed using 4-0 Vicryl running subcuticular stitches and dressed with Dermabond.    Estimated blood loss: 5 mL    IV fluid: 900 mL

## 2020-06-12 NOTE — ANESTHESIA CARE TRANSFER NOTE
Patient: Micah Lay    Procedure(s):  Laparoscopic bilateral inguinal hernia repair with mesh    Diagnosis: Non-recurrent unilateral inguinal hernia without obstruction or gangrene [K40.90]  Diagnosis Additional Information: No value filed.    Anesthesia Type:   General     Note:  Airway :Oral Airway and Face Mask  Patient transferred to:PACU  Handoff Report: Identifed the Patient, Identified the Reponsible Provider, Reviewed the pertinent medical history, Discussed the surgical course, Reviewed Intra-OP anesthesia mangement and issues during anesthesia, Set expectations for post-procedure period and Allowed opportunity for questions and acknowledgement of understanding      Vitals: (Last set prior to Anesthesia Care Transfer)    CRNA VITALS  6/12/2020 1029 - 6/12/2020 1104      6/12/2020             Pulse:  79    SpO2:  94 %                Electronically Signed By: Artemio Cai CRNA, APRN CRNA  June 12, 2020  11:04 AM

## 2020-06-12 NOTE — ANESTHESIA POSTPROCEDURE EVALUATION
Patient: Micah MOREIRA Stone    Procedure(s):  Laparoscopic bilateral inguinal hernia repair with mesh    Diagnosis:Non-recurrent unilateral inguinal hernia without obstruction or gangrene [K40.90]  Diagnosis Additional Information: No value filed.    Anesthesia Type:  General    Note:  Anesthesia Post Evaluation    Patient location during evaluation: Phase 2 and Bedside  Patient participation: Able to fully participate in evaluation  Level of consciousness: awake and alert  Pain management: adequate  Airway patency: patent  Cardiovascular status: acceptable  Respiratory status: acceptable  Hydration status: acceptable  PONV: none     Anesthetic complications: None          Last vitals:  Vitals:    06/12/20 1130 06/12/20 1138 06/12/20 1145   BP: 100/72  128/89   Pulse: 75  73   Resp: 10  10   Temp: 36.7  C (98.1  F)     SpO2: 96% 98% 96%         Electronically Signed By: Artemio Cai CRNA, APRN CRNA  June 12, 2020  12:11 PM

## 2020-06-15 ENCOUNTER — TELEPHONE (OUTPATIENT)
Dept: SURGERY | Facility: CLINIC | Age: 55
End: 2020-06-15

## 2020-06-15 DIAGNOSIS — G89.18 POSTOPERATIVE PAIN: ICD-10-CM

## 2020-06-15 RX ORDER — HYDROCODONE BITARTRATE AND ACETAMINOPHEN 5; 325 MG/1; MG/1
1-2 TABLET ORAL EVERY 6 HOURS PRN
Qty: 15 TABLET | Refills: 0 | Status: SHIPPED | OUTPATIENT
Start: 2020-06-15 | End: 2020-06-26

## 2020-06-15 NOTE — TELEPHONE ENCOUNTER
Requested Prescriptions   Pending Prescriptions Disp Refills     HYDROcodone-acetaminophen (NORCO) 5-325 MG tablet 20 tablet 0     Sig: Take 1-2 tablets by mouth every 6 hours as needed for pain       There is no refill protocol information for this order        Last Written Prescription Date:    Last Fill Quantity: ,  # refills:    Last office visit: 6/3/2020 with prescribing provider:  Dioni   Future Office Visit:   Next 5 appointments (look out 90 days)    Jun 26, 2020  9:45 AM CDT  Return Visit with Jd Wheatley MD  [facility] [address] [facility] [place] [id number] [phone]

## 2020-06-16 LAB — COPATH REPORT: NORMAL

## 2020-06-19 ENCOUNTER — NURSE TRIAGE (OUTPATIENT)
Dept: NURSING | Facility: CLINIC | Age: 55
End: 2020-06-19

## 2020-06-20 NOTE — TELEPHONE ENCOUNTER
Had hernia surgery last Friday 6-12-20.   Is calling for another refill on his Hydrocodone; has three tablets left.    Paged on-call [place] surgery Dr. Gilmar Easley to [phone] via [employer].    Patient to start Motrin 800 mg every 8 hours and try to space out remaining Hydrocodone.  If patient still having too much pain over the weekend, should call back.  Recommended patient be sure to take with food.     Gilda Shen RN  [facility]      Reason for Disposition    [1] Caller has URGENT question AND [2] triager unable to answer question    Additional Information    Negative: Fever > 100.4 F (38.0 C)    Negative: Sounds like a life-threatening emergency to the triager    Negative: [1] Widespread rash AND [2] bright red, sunburn-like    Negative: [1] SEVERE headache AND [2] after spinal (epidural) anesthesia    Negative: [1] Vomiting AND [2] persists > 4 hours    Negative: [1] Vomiting AND [2] abdomen looks much more swollen than usual    Negative: [1] Drinking very little AND [2] dehydration suspected (e.g., no urine > 12 hours, very dry mouth, very lightheaded)    Negative: Patient sounds very sick or weak to the triager    Negative: Sounds like a serious complication to the triager    Negative: Fever > 100.5 F (38.1 C)    Negative: [1] SEVERE post-op pain (e.g., excruciating, pain scale 8-10) AND [2] not controlled with pain medications    Protocols used: POST-OP SYMPTOMS AND [id number], POST-OP SYMPTOMS AND [id number]

## 2020-06-26 ENCOUNTER — OFFICE VISIT (OUTPATIENT)
Dept: SURGERY | Facility: CLINIC | Age: 55
End: 2020-06-26
Payer: COMMERCIAL

## 2020-06-26 VITALS
SYSTOLIC BLOOD PRESSURE: 156 MMHG | DIASTOLIC BLOOD PRESSURE: 101 MMHG | RESPIRATION RATE: 18 BRPM | TEMPERATURE: 98.3 F | HEART RATE: 79 BPM

## 2020-06-26 DIAGNOSIS — Z09 POSTOP CHECK: Primary | ICD-10-CM

## 2020-06-26 PROCEDURE — 99024 POSTOP FOLLOW-UP VISIT: CPT | Performed by: SURGERY

## 2020-06-26 NOTE — LETTER
6/26/2020         RE: [facility]  [address] [facility] [address]        Dear Colleague,    Thank you for referring your patient, Micah Lay, to the [facility]. Please see a copy of my visit note below.    No complaints.  Pain controlled with oral pain meds.    BP (!) 156/101 (BP Location: Right arm, Patient Position: Chair, Cuff Size: Adult Regular)   Pulse 79   Temp 98.3  F (36.8  C) (Tympanic)   Resp 18     Exam  [id number]  CTAB  RRR  S&NTND+BS, wounds - cdi s erythema  No CCE    A/P s/p lap BIH healing well.   RTC prn.    Jd Wheatley MD     Again, thank you for allowing me to participate in the care of your patient.        Sincerely,        Jd Wheatley MD

## 2020-06-26 NOTE — PROGRESS NOTES
No complaints.  Pain controlled with oral pain meds.    BP (!) 156/101 (BP Location: Right arm, Patient Position: Chair, Cuff Size: Adult Regular)   Pulse 79   Temp 98.3  F (36.8  C) (Tympanic)   Resp 18     Exam  [id number]  CTAB  RRR  S&NTND+BS, wounds - cdi s erythema  No CCE    A/P s/p lap BIH healing well.   RTC prn.    Jd Wheatley MD

## 2020-06-26 NOTE — NURSING NOTE
"Chief Complaint   Patient presents with     Surgical Followup     06/12 Lap Bilateral Inguinal Hernia repair with mesh        Initial BP (!) 152/90 (BP Location: Right arm, Patient Position: Chair, Cuff Size: Adult Regular)   Pulse 79   Temp 98.3  F (36.8  C) (Tympanic)   Resp 18  Estimated body mass index is 34.31 kg/m  as calculated from the following:    Height as of 6/3/20: 1.721 m (5' 7.75\").    Weight as of 6/3/20: 101.6 kg (224 lb).  BP completed using cuff size: regular long   Medications and allergies reviewed.      Laura DOZIER CMA     "

## 2020-06-26 NOTE — PATIENT INSTRUCTIONS
Per physician instructions.    If you have questions or concerns on any instructions given to you by your provider today or if you need to schedule an appointment, you can reach us at [phone].    Thank you!

## 2020-11-22 ENCOUNTER — HEALTH MAINTENANCE LETTER (OUTPATIENT)
Age: 55
End: 2020-11-22

## 2020-11-28 ENCOUNTER — HOSPITAL ENCOUNTER (EMERGENCY)
Facility: CLINIC | Age: 55
Discharge: HOME | End: 2020-11-28
Attending: NURSE PRACTITIONER | Admitting: NURSE PRACTITIONER
Payer: COMMERCIAL

## 2020-11-28 VITALS
BODY MASS INDEX: 33.34 KG/M2 | HEIGHT: 68 IN | HEART RATE: 70 BPM | WEIGHT: 220 LBS | TEMPERATURE: 98 F | SYSTOLIC BLOOD PRESSURE: 157 MMHG | OXYGEN SATURATION: 98 % | RESPIRATION RATE: 18 BRPM | DIASTOLIC BLOOD PRESSURE: 82 MMHG

## 2020-11-28 DIAGNOSIS — M54.50 ACUTE RIGHT-SIDED LOW BACK PAIN WITHOUT SCIATICA: ICD-10-CM

## 2020-11-28 PROCEDURE — 99214 OFFICE O/P EST MOD 30 MIN: CPT | Performed by: NURSE PRACTITIONER

## 2020-11-28 PROCEDURE — G0463 HOSPITAL OUTPT CLINIC VISIT: HCPCS | Performed by: NURSE PRACTITIONER

## 2020-11-28 RX ORDER — CYCLOBENZAPRINE HCL 10 MG
10 TABLET ORAL 3 TIMES DAILY PRN
Qty: 20 TABLET | Refills: 0 | Status: SHIPPED | OUTPATIENT
Start: 2020-11-28 | End: 2020-12-04

## 2020-11-28 RX ORDER — OXYCODONE HYDROCHLORIDE 5 MG/1
5 TABLET ORAL EVERY 6 HOURS PRN
Qty: 12 TABLET | Refills: 0 | Status: SHIPPED | OUTPATIENT
Start: 2020-11-28 | End: 2021-04-19

## 2020-11-28 ASSESSMENT — ENCOUNTER SYMPTOMS
SHORTNESS OF BREATH: 0
CHILLS: 0
GASTROINTESTINAL NEGATIVE: 1
BACK PAIN: 1
NEUROLOGICAL NEGATIVE: 1
COUGH: 0
FEVER: 0
CARDIOVASCULAR NEGATIVE: 1

## 2020-11-28 ASSESSMENT — MIFFLIN-ST. JEOR: SCORE: 1812.41

## 2020-11-28 NOTE — DISCHARGE INSTRUCTIONS
Ice packs alternating with heat.  Flexeril 10 mg three times a day as needed (muscle relaxer)  Ibuprofen 400-600 mg every 6-8 hours as needed for mild/moderate pain. Take with food. Stop if it is causing nausea or abdominal pain.   Tylenol (Acetaminophen) 650 mg every 4-6 hours as needed for mild/moderate pain.  Oxycodone 5 mg every 6 hours if needed for moderate/severe pain. Do not use alcohol, operate machinery, drive, or climb on ladders for 8 hours after taking Oxycodone.   Use docusate (100mg) 2 times a day to prevent constipation while on opioid medication.    Recheck in clinic if you are not improving in 2-3 days.

## 2020-11-28 NOTE — ED AVS SNAPSHOT
[facility] Emergency Dept  [address] [address]  Phone: [phone]  Fax: [phone]                                    Micah Lay   MRN: [id number]    Department: [facility] Emergency Dept   Date of Visit: 11/28/2020           After Visit Summary Signature Page    I have received my discharge instructions, and my questions have been answered. I have discussed any challenges I see with this plan with the nurse or doctor.    ..........................................................................................................................................  Patient/Patient Representative Signature      ..........................................................................................................................................  Patient Representative Print Name and Relationship to Patient    ..................................................               ................................................  Date                                   Time    ..........................................................................................................................................  Reviewed by Signature/Title    ...................................................              ..............................................  Date                                               Time          22EPIC Rev 08/18

## 2020-11-28 NOTE — LETTER
November 28, 2020      To Whom It May Concern:      Micah HARISH Lay was seen in our Emergency Department today, 11/28/20.  Please excuse from work 11/28/2020 - 11/30/2020.    Sincerely,        FLORENCE Roach CNP

## 2021-01-20 DIAGNOSIS — E03.9 HYPOTHYROIDISM, UNSPECIFIED TYPE: ICD-10-CM

## 2021-01-20 NOTE — TELEPHONE ENCOUNTER
"Requested Prescriptions   Pending Prescriptions Disp Refills     levothyroxine (SYNTHROID/LEVOTHROID) 150 MCG tablet [Pharmacy Med Name: LEVOTHYROXINE 150 MCG TABLET] 180 tablet 3     Sig: TAKE 2 TABLETS (300 MCG) BY MOUTH DAILY       Thyroid Protocol Failed - 1/20/2021 12:35 AM        Failed - Normal TSH on file in past 12 months     Recent Labs   Lab Test 01/06/20  1432   TSH 0.37*              Passed - Patient is 12 years or older        Passed - Recent (12 mo) or future (30 days) visit within the authorizing provider's specialty     Patient has had an office visit with the authorizing provider or a provider within the authorizing providers department within the previous 12 mos or has a future within next 30 days. See \"Patient Info\" tab in inbasket, or \"Choose Columns\" in Meds & Orders section of the refill encounter.              Passed - Medication is active on med list             "

## 2021-01-21 DIAGNOSIS — E03.9 HYPOTHYROIDISM, UNSPECIFIED TYPE: ICD-10-CM

## 2021-01-21 NOTE — TELEPHONE ENCOUNTER
"Requested Prescriptions   Pending Prescriptions Disp Refills     pravastatin (PRAVACHOL) 40 MG tablet [Pharmacy Med Name: PRAVASTATIN SODIUM 40 MG TAB] 90 tablet 3     Sig: TAKE 1 TABLET BY MOUTH EVERY DAY       Statins Protocol Failed - 1/21/2021 12:26 AM        Failed - LDL on file in past 12 months     Recent Labs   Lab Test 01/06/20  1432   *             Passed - No abnormal creatine kinase in past 12 months     No lab results found.             Passed - Recent (12 mo) or future (30 days) visit within the authorizing provider's specialty     Patient has had an office visit with the authorizing provider or a provider within the authorizing providers department within the previous 12 mos or has a future within next 30 days. See \"Patient Info\" tab in inbasket, or \"Choose Columns\" in Meds & Orders section of the refill encounter.              Passed - Medication is active on med list        Passed - Patient is age 18 or older             "

## 2021-01-22 RX ORDER — LEVOTHYROXINE SODIUM 150 UG/1
300 TABLET ORAL DAILY
Qty: 60 TABLET | Refills: 0 | Status: SHIPPED | OUTPATIENT
Start: 2021-01-22 | End: 2021-03-31

## 2021-01-22 NOTE — TELEPHONE ENCOUNTER
Medication is being filled for 1 time refill only due to:  Patient needs to be seen because it has been more than one year since last visit. Labs due and out of range  Chary Nunez RN

## 2021-01-25 NOTE — TELEPHONE ENCOUNTER
Routing refill request to provider for review/approval because:  Labs not current:  Lipids    Thank you    Lydia MANCUSO RN

## 2021-01-26 RX ORDER — PRAVASTATIN SODIUM 40 MG
TABLET ORAL
Qty: 90 TABLET | Refills: 0 | Status: SHIPPED | OUTPATIENT
Start: 2021-01-26 | End: 2024-10-02

## 2021-02-13 ENCOUNTER — HEALTH MAINTENANCE LETTER (OUTPATIENT)
Age: 56
End: 2021-02-13

## 2021-03-05 DIAGNOSIS — N32.89 BLADDER SPASMS: ICD-10-CM

## 2021-03-09 RX ORDER — OXYBUTYNIN CHLORIDE 10 MG/1
TABLET, EXTENDED RELEASE ORAL
Qty: 90 TABLET | Refills: 0 | Status: SHIPPED | OUTPATIENT
Start: 2021-03-09 | End: 2021-06-07

## 2021-03-26 ENCOUNTER — HOSPITAL ENCOUNTER (EMERGENCY)
Facility: CLINIC | Age: 56
Discharge: HOME | End: 2021-03-26
Attending: EMERGENCY MEDICINE | Admitting: EMERGENCY MEDICINE
Payer: COMMERCIAL

## 2021-03-26 ENCOUNTER — APPOINTMENT (OUTPATIENT)
Dept: GENERAL RADIOLOGY | Facility: CLINIC | Age: 56
End: 2021-03-26
Attending: EMERGENCY MEDICINE
Payer: COMMERCIAL

## 2021-03-26 ENCOUNTER — APPOINTMENT (OUTPATIENT)
Dept: MRI IMAGING | Facility: CLINIC | Age: 56
End: 2021-03-26
Attending: EMERGENCY MEDICINE
Payer: COMMERCIAL

## 2021-03-26 VITALS
TEMPERATURE: 97.2 F | DIASTOLIC BLOOD PRESSURE: 95 MMHG | HEART RATE: 75 BPM | WEIGHT: 223 LBS | BODY MASS INDEX: 33.91 KG/M2 | SYSTOLIC BLOOD PRESSURE: 140 MMHG | OXYGEN SATURATION: 99 % | RESPIRATION RATE: 18 BRPM

## 2021-03-26 DIAGNOSIS — M79.671 PAIN OF RIGHT HEEL: ICD-10-CM

## 2021-03-26 DIAGNOSIS — S86.001A ACHILLES TENDON INJURY, RIGHT, INITIAL ENCOUNTER: ICD-10-CM

## 2021-03-26 PROCEDURE — 99284 EMERGENCY DEPT VISIT MOD MDM: CPT | Performed by: EMERGENCY MEDICINE

## 2021-03-26 PROCEDURE — 73721 MRI JNT OF LWR EXTRE W/O DYE: CPT | Mod: 26 | Performed by: RADIOLOGY

## 2021-03-26 PROCEDURE — 99285 EMERGENCY DEPT VISIT HI MDM: CPT | Performed by: EMERGENCY MEDICINE

## 2021-03-26 PROCEDURE — 29515 APPLICATION SHORT LEG SPLINT: CPT | Mod: RT | Performed by: EMERGENCY MEDICINE

## 2021-03-26 PROCEDURE — 73721 MRI JNT OF LWR EXTRE W/O DYE: CPT | Mod: RT

## 2021-03-26 PROCEDURE — 250N000013 HC RX MED GY IP 250 OP 250 PS 637: Performed by: EMERGENCY MEDICINE

## 2021-03-26 PROCEDURE — 73610 X-RAY EXAM OF ANKLE: CPT | Mod: RT

## 2021-03-26 RX ORDER — HYDROCODONE BITARTRATE AND ACETAMINOPHEN 5; 325 MG/1; MG/1
1 TABLET ORAL EVERY 6 HOURS PRN
Qty: 10 TABLET | Refills: 0 | Status: SHIPPED | OUTPATIENT
Start: 2021-03-26 | End: 2021-03-29

## 2021-03-26 RX ORDER — HYDROCODONE BITARTRATE AND ACETAMINOPHEN 5; 325 MG/1; MG/1
1 TABLET ORAL ONCE
Status: COMPLETED | OUTPATIENT
Start: 2021-03-26 | End: 2021-03-26

## 2021-03-26 RX ORDER — ACETAMINOPHEN 500 MG
1000 TABLET ORAL ONCE
Status: DISCONTINUED | OUTPATIENT
Start: 2021-03-26 | End: 2021-03-26

## 2021-03-26 RX ADMIN — HYDROCODONE BITARTRATE AND ACETAMINOPHEN 1 TABLET: 5; 325 TABLET ORAL at 11:31

## 2021-03-26 RX ADMIN — IBUPROFEN 600 MG: 400 TABLET, FILM COATED ORAL at 11:30

## 2021-03-26 ASSESSMENT — ENCOUNTER SYMPTOMS
EYES NEGATIVE: 1
PSYCHIATRIC NEGATIVE: 1
HEMATOLOGIC/LYMPHATIC NEGATIVE: 1
GASTROINTESTINAL NEGATIVE: 1
NEUROLOGICAL NEGATIVE: 1
ENDOCRINE NEGATIVE: 1
RESPIRATORY NEGATIVE: 1
ALLERGIC/IMMUNOLOGIC NEGATIVE: 1
CARDIOVASCULAR NEGATIVE: 1
CONSTITUTIONAL NEGATIVE: 1

## 2021-03-26 NOTE — ED PROVIDER NOTES
History     Chief Complaint   Patient presents with     Leg Pain     HPI  Micah Lay is a 55 year old male who presents by car alone from home stating that he injured his right heel after stepping awkwardly while walking off a deck developing acute pain about the posterior heel and Achilles tendon.  Patient reports pain with weightbearing, and localized swelling and tenderness around the calcaneus and Achilles tendon.  Patient has a history of hypothyroidism, bladder spasms and hyperlipidemia.  He is currently prescribed levothyroxine, pravastatin, and oxybutynin.  He reports no paresthesias about the foot and no ankle pain.  He reports no knee pain and no hip pain.  Due to his job as a  and concern with weightbearing and pain after falling off a deck down 3 steps he presents for further care.    Allergies:  No Known Allergies    Problem List:    Patient Active Problem List    Diagnosis Date Noted     Non-recurrent unilateral inguinal hernia without obstruction or gangrene 10/18/2018     Priority: Medium     Bladder spasms 09/15/2016     Priority: Medium     Hypothyroidism, unspecified type 05/25/2016     Priority: Medium     HYPERLIPIDEMIA LDL GOAL <130 10/31/2010     Priority: Medium     Family history of colon cancer 08/23/2010     Priority: Medium     Family history of aneurysm 08/23/2010     Priority: Medium     Screening for hypertension 11/03/2008     Priority: Medium        Past Medical History:    Past Medical History:   Diagnosis Date     Other and unspecified hyperlipidemia may 2006     Sprain of lumbar region      Unspecified hypothyroidism        Past Surgical History:    Past Surgical History:   Procedure Laterality Date     LAPAROSCOPIC HERNIORRHAPHY INGUINAL BILATERAL Bilateral 6/12/2020    Procedure: Laparoscopic bilateral inguinal hernia repair with mesh;  Surgeon: Jd Wheatley MD;  Location: [facility] OR       Family History:    Family History   Problem Relation Age of  Onset     Arthritis Mother      Thyroid Disease Mother      Lipids Mother      Hypertension Father      Allergies Son      Neurologic Disorder Son         taking concerta     Connective Tissue Disorder Maternal Grandfather      Hypertension Brother      Obesity Brother      Lipids Brother      Cardiovascular Brother         luan       Social History:  Marital Status:   [2]  Social History     Tobacco Use     Smoking status: Never Smoker     Smokeless tobacco: Never Used   Substance Use Topics     Alcohol use: No     Drug use: No        Medications:    HYDROcodone-acetaminophen (NORCO) 5-325 MG tablet  calcium-magnesium (CALMAG) 500-250 MG TABS per tablet  levothyroxine (SYNTHROID/LEVOTHROID) 150 MCG tablet  Melatonin (MELATONIN TR) 10 MG TBCR  oxybutynin ER (DITROPAN-XL) 10 MG 24 hr tablet  oxyCODONE (ROXICODONE) 5 MG tablet  pravastatin (PRAVACHOL) 40 MG tablet  Vitamin D3 (CHOLECALCIFEROL) 25 mcg (1000 units) tablet  vitamin E (TOCOPHEROL) 100 units (90 mg) capsule          Review of Systems   Constitutional: Negative.    HENT: Negative.    Eyes: Negative.    Respiratory: Negative.    Cardiovascular: Negative.    Gastrointestinal: Negative.    Endocrine: Negative.    Genitourinary: Negative.    Musculoskeletal:        Right achilles and calcaneal pain   Skin: Negative.    Allergic/Immunologic: Negative.    Neurological: Negative.    Hematological: Negative.    Psychiatric/Behavioral: Negative.    All other systems reviewed and are negative.      Physical Exam   BP: (!) 164/106  Pulse: 77  Temp: 97.2  F (36.2  C)  Resp: 18  Weight: 101.2 kg (223 lb)  SpO2: 98 %      Physical Exam  Constitutional:       General: He is not in acute distress.     Appearance: He is not ill-appearing, toxic-appearing or diaphoretic.   HENT:      Head: Normocephalic and atraumatic.   Eyes:      Extraocular Movements: Extraocular movements intact.      Pupils: Pupils are equal, round, and reactive to light.   Neck:       Musculoskeletal: Normal range of motion. No muscular tenderness.      Vascular: No carotid bruit.   Cardiovascular:      Rate and Rhythm: Normal rate and regular rhythm.      Pulses: Normal pulses.      Heart sounds: Normal heart sounds.   Pulmonary:      Effort: Pulmonary effort is normal. No respiratory distress.      Breath sounds: Normal breath sounds. No stridor. No wheezing, rhonchi or rales.   Chest:      Chest wall: No tenderness.   Musculoskeletal:         General: Tenderness and signs of injury present. No swelling.      Right shoulder: He exhibits tenderness.      Right lower leg: He exhibits tenderness and swelling.        Legs:    Skin:     Capillary Refill: Capillary refill takes less than 2 seconds.   Neurological:      General: No focal deficit present.      Mental Status: He is alert and oriented to person, place, and time.      Cranial Nerves: No cranial nerve deficit.      Sensory: No sensory deficit.      Motor: No weakness.      Coordination: Coordination normal.      Gait: Gait normal.      Deep Tendon Reflexes: Reflexes normal.   Psychiatric:         Mood and Affect: Mood normal.         Behavior: Behavior normal.         Thought Content: Thought content normal.         Judgment: Judgment normal.         ED Course        Procedures               Critical Care time:  none               ED medications:  Medications   ibuprofen (ADVIL/MOTRIN) tablet 600 mg (600 mg Oral Given 3/26/21 1130)   HYDROcodone-acetaminophen (NORCO) 5-325 MG per tablet 1 tablet (1 tablet Oral Given 3/26/21 1131)       ED vitals:  Vitals:    03/26/21 0953   BP: (!) 164/106   Pulse: 77   Resp: 18   Temp: 97.2  F (36.2  C)   TempSrc: Temporal   SpO2: 98%   Weight: 101.2 kg (223 lb)       ED labs and imaging:  Results for orders placed or performed during the hospital encounter of 03/26/21   Ankle XR, G/E 3 views, right     Status: None (Preliminary result)    Narrative    RIGHT ANKLE THREE OR MORE VIEWS   3/26/2021 11:22 AM      HISTORY: Acute right posterior heel and achilles pain after awkward  fall. Evaluate for Achilles injury versus other acute bony process  after blunt trauma.    COMPARISON: None.      Impression    IMPRESSION: No evidence of fracture. Mortise and talar dome are  intact. Calcaneus is normal. The shadow of the Achilles tendon may be  mildly thickened. If there is suspicion of Achilles tendon injury  recommend MRI.             Assessments & Plan (with Medical Decision Making)   Assessment Summary and Clinical Impression: 55-year-old male who presented with acute right heel and Achilles pain after an awkward fall off a deck missing 2-3 steps.  He has an abnormal Garcia test on the right leg.  There was swelling and redness about the calcaneus and achilles-suspicious for Achilles tendon injury or rupture. The foot was warm and well-perfused without paresthesias or anesthesia reported  Patient elected to have definitive imaging to help with further care after reviewing options for management including limitations with imaging modalities and approach to managing Achilles injury.  At shift end patient was awaiting MRI imaging for definitive diagnosis of Achilles tendon injury with plan for outpatient follow-up in orthopedic and sports medicine clinic for further definitive management.  Plan at handoff and at shift end is if MRI confirms Achilles tendon rupture he will be followed through the orthopedic  referral service . Crutches and Cam boot, pain management and work release note provided.    ED course and plan:  Reviewed the medical record.  We discussed and reviewed possible causes for his pain and discomfort.  Given his positive Garcia test, location of his pain we discussed the possibility of tendinopathy including partial or full tendon rupture.  We discussed limitations of imaging options including options for care including supportive care with pain management a cam boot and outpatient follow-up in  sports medicine clinic for non-emergent follow-up evaluation.  After reviewing options for care because of his work as a  he requested expedited work-up.  MRI was delayed-and we reviewed alternative imaging modalities including ultrasound and x-ray imaging. Ultrasound and x-ray imaging was requested.  I was informed that the ultrasound requested could not be completed.  MRI was obtained as an alternate imaging modality. X-ray imaging was reviewed independently and the radiologist interpretation was reviewed.  There was no bony fracture appreciated an MRI was advised for suspicion for Achilles tendon injury.    At shift end patient was signed out to Aisha Clayton PA-C at 3.45 PM awaiting MRI completion and interpretation with plan for outpatient follow-up with the orthopedic  service for further definitive management with plan to use cam boot and walker, work note and pain management reviewed.      Disclaimer: This note consists of symbols derived from keyboarding, dictation and/or voice recognition software. As a result, there may be errors in the script that have gone undetected. Please consider this when interpreting information found in this chart.  I have reviewed the nursing notes.    I have reviewed the findings, diagnosis, plan and need for follow up with the patient.       New Prescriptions    HYDROCODONE-ACETAMINOPHEN (NORCO) 5-325 MG TABLET    Take 1 tablet by mouth every 6 hours as needed for severe pain       Final diagnoses:   Pain of right heel - acute injury after awkward fall earlier in the day concerning for Achilles injury   Achilles tendon injury, right, initial encounter - right heel pain and discomfort.       3/26/2021   [facility] EMERGENCY DEPT     Mir Howe MD  03/26/21 1600

## 2021-03-26 NOTE — DISCHARGE INSTRUCTIONS
1) Your evaluation today suggest that you have suffered an injury to your right Achilles tendon.  We have discussed next steps for care and to help manage symptoms.  MRI imaging was completed at your request to help facilitate outpatient follow-up.    2) A referral was placed to the orthopedic and sports medicine clinic to help with further definitive management and further care.  You should be called for follow-up appointment in the next 1 to 2 weeks.  You are placed in a cam boot to help with pain management.  You are to use the crutches to help with weightbearing.    3) You may take ibuprofen if needed for pain management and use pain medication as needed and provided.  Although you appear stable for discharge to home if you develop new symptoms of concern you should return to the emergency department to be reevaluated

## 2021-03-26 NOTE — LETTER
March 26, 2021      To Whom It May Concern:      Micah Lay was seen in our Emergency Department today, 03/26/21.  Please excuse him from work until he is cleared by his care team for his injury.  This work excuse is valid until April 26, 2021.  You need additional care and management and may need some restrictions while at work until he is cleared to fully return to his work duty capacity.      Sincerely,         Taurus Howe MD

## 2021-03-28 DIAGNOSIS — E03.9 HYPOTHYROIDISM, UNSPECIFIED TYPE: ICD-10-CM

## 2021-03-29 NOTE — TELEPHONE ENCOUNTER
"Requested Prescriptions   Pending Prescriptions Disp Refills     levothyroxine (SYNTHROID/LEVOTHROID) 150 MCG tablet [Pharmacy Med Name: LEVOTHYROXINE 150 MCG TABLET] 60 tablet 0     Sig: TAKE 2 TABLETS (300 MCG) BY MOUTH DAILY NEED APPT AND LABS BEFORE FURTHER REFILLS       Thyroid Protocol Failed - 3/28/2021 12:30 PM        Failed - Normal TSH on file in past 12 months     Recent Labs   Lab Test 01/06/20  1432   TSH 0.37*              Passed - Patient is 12 years or older        Passed - Recent (12 mo) or future (30 days) visit within the authorizing provider's specialty     Patient has had an office visit with the authorizing provider or a provider within the authorizing providers department within the previous 12 mos or has a future within next 30 days. See \"Patient Info\" tab in inbasket, or \"Choose Columns\" in Meds & Orders section of the refill encounter.              Passed - Medication is active on med list             "

## 2021-03-30 NOTE — TELEPHONE ENCOUNTER
Routing refill request to provider for review/approval because:  Labs out of range:  TSH - 0.37  Labs not current:  TSH 1/6/20  Patient needs to be seen because:  Last annual visit 1/6/20 with another provider    Chary Nunez RN

## 2021-03-31 ENCOUNTER — OFFICE VISIT (OUTPATIENT)
Dept: PODIATRY | Facility: CLINIC | Age: 56
End: 2021-03-31
Attending: EMERGENCY MEDICINE
Payer: COMMERCIAL

## 2021-03-31 VITALS
WEIGHT: 223 LBS | SYSTOLIC BLOOD PRESSURE: 147 MMHG | DIASTOLIC BLOOD PRESSURE: 90 MMHG | HEIGHT: 68 IN | HEART RATE: 68 BPM | BODY MASS INDEX: 33.8 KG/M2

## 2021-03-31 DIAGNOSIS — S86.001A ACHILLES TENDON INJURY, RIGHT, INITIAL ENCOUNTER: ICD-10-CM

## 2021-03-31 PROCEDURE — 99204 OFFICE O/P NEW MOD 45 MIN: CPT | Performed by: PODIATRIST

## 2021-03-31 RX ORDER — LEVOTHYROXINE SODIUM 150 UG/1
300 TABLET ORAL DAILY
Qty: 60 TABLET | Refills: 0 | Status: SHIPPED | OUTPATIENT
Start: 2021-03-31 | End: 2021-04-03

## 2021-03-31 ASSESSMENT — MIFFLIN-ST. JEOR: SCORE: 1826.02

## 2021-03-31 NOTE — PATIENT INSTRUCTIONS
Surgery Scheduling   You have elected to proceed with surgery for Primary repair of a ruptured achilles tendon, right.  Dr. Baird performs his surgeries on Tuesdays at [facility].   To schedule your surgery date please call [phone].  If no one answers, please leave a message with a good time for our staff to call you back.    - Please have a date in mind for your surgery, you can feel free to leave that date on the message, and we will schedule and call back to confirm.   - You can expect to receive a call back the same day or on the next business day from Dr. Baird s team to assist in the scheduling.   - We will assist to schedule the date of your surgery.    o The time will be determined a few days ahead of time.    o You can expect a call from Same Day Surgery 2-3 days ahead of time with specific instructions for what time to arrive at the hospital as well as any other preparations you should take prior to surgery.  - You may need to obtain a pre-operative physical from a primary medical provider.    o This must be done within 30 days of your surgery date.  - You will also need a preoperative COVID test.    o This is typically done 4 days before your scheduled surgery date.    o As most surgeries are scheduled on Tuesday, this means the test will need to be performed on the Friday before surgery.    o There are several centers where you can have the tests taken and you may need to go to the center that has availability.    - We will also schedule your first post-operative appointment for a bandage and wound check for the Monday following your surgery at the [place] location.  - You may be non-weight bearing for a period of up to 6 weeks.  Options for this include: (Please indicate which you would prefer so we can provide you with an order and instructions)  o Crutches  o Walker  o Roll-a-bout knee walker.  - If you will need paperwork filled out for your employer you may drop those off at  the clinic directly or you may have those faxed to us at [phone].  Please indicate on the form the date you would like the FMLA to begin if it will not be your surgery date.    The forms are typically filled out for up to 12 weeks, however you may be cleared to return prior to that time depending on your individual healing and job requirements.    GETTING READY FOR YOUR SURGERY    ONE-THREE WEEKS BEFORE  1. See your Family Doctor or Primary Care Provider for a Preoperative History and Physical.    2. Please see pre-surgical medications below to which medications need to be stopped before surgery and when.    SAME DAY SURGERY PATIENTS  1. You will need a family member of friend to drive you home. If you do not have one the surgery will be cancelled or rescheduled.  2. You will need a responsible adult to stay with you that night after the surgery.       We will ask this person to listen to some instructions before you leave the hospital.    DAY BEFORE SURGERY  1. DO NOT EAT OR DRINK ANYTHING AFTER MIDNIGHT THE NIGHT  BEFORE YOUR SURGERY!   2. DO NOT DRINK ALCOHOL.  3. Do not take over the counter drugs.  4. Some people need to have blood tests at the hospital. If you need blood tests, we will tell you in advance.  5. Take medications as directed by your doctor. You may take these with a small amount of water.  6. Do not chew gum, chew tobacco, or suck on hard candy the day of surgery.  7. Bring your insurance cards, a list of your medicines and co-pays you might need. Leave jewelry and other valuables at home.      PRESURGICAL MEDICATIONS:  Certain prescription, over-the-counter, and herbal medications interfere with healing after an operation. The main concern relates to medications that increase bleeding at the surgical site. Excess blood under the incision results in poor wound healing, excess pain, increased scarring, and a higher risk of infection.    Some medications slow the healing process of bone.  Medications can also interfere with the anesthesia drugs that keep you asleep during the operation. It is important to ensure that these medications are out of your system prior to the operation. The list below details a number of medications that are of concern. Pay special attention to how long you should avoid these medications before your operation. Please note that this list is not complete. You should ask your surgeon or pharmacist if you are uncertain about other medications. Any herbal supplement not listed should be discontinued at least one week prior to surgery.    Aspirin: Hold for one week prior to surgery and restart the day after surgery. This over the counter medication promotes bleeding.    Motrin / Ibuprofen / Aleve / Advil / NSAIDS:  Stop one week prior to surgery. These medications affect bleeding and may cause delay in bone healing. Avoid taking these medications for six weeks after bone surgeries. Other procedures may allow you to restart sooner than 6 weeks after surgery.    Coumadin / Plavix: Your primary care provider will manage Coumadin in relation to surgery. Coumadin may result in excessive bleeding and may be adjusted before and after surgery.    Enbriel: Stop two weeks prior to surgery and restart two weeks after surgery. This medication can effect soft tissue healing and increases the risk of infection.    Remicade: Stop 8-12 weeks before surgery and restart two weeks after surgery. This medication can affect soft tissue healing and increases the risk of infection.    Humira: Stop 4 weeks before surgery and restart two weeks after surgery. This medication can affect soft tissue healing and increases the risk of infection.    Methotrexate: Stop one dose prior to surgery. This medication will be restarted when the wound appears to be healing well. Please ask your surgeon about restarting this medication when you are being seen in the office for wound checks.    Kava: Stop at least one  day prior to surgery and may restart one day after surgery. Kava may increase the sedative effect of anesthetics that are given during the operation. Kava can also increase bleeding at the surgical site.    Ephedra (ma pruitt): Stop at least one day prior to surgery and may restart one day after surgery.  Ephedra may increase the risk of heart attack and stroke. This medication can also increase bleeding at the surgical site.    Lorna's Wort: Stop at least five days before surgery and may restart one day after surgery. Lorna's wort may diminish the effects of several drugs that are given during surgery.    Ginseng: Stop at least one week prior to surgery and may restart one day after surgery.  Ginseng lowers blood sugar and may increase bleeding at the surgical site.    Ginkgo: Stop 36 hours before surgery and may restart one day after surgery. Ginkgo may increase bleeding at the surgical site.    Garlic: Stop at least one week prior to surgery and may restart one day after. Garlic may increase bleeding at the surgery site.    Valerian: Do a slow steady decrease in your daily dose over a period of 2-3 weeks before surgery to decrease the chance of withdrawal symptoms. Valerian may increase the sedative effect of anesthetics given during the operation.    Echinacea: There is no data on stopping echinacea prior to surgery. This medication though can be associated with allergic reactions and suppression of your immune system.    Vitamin E, Omega-3, Flax, Fish Oil, Glucosamine and Chondroitin: Stop 2 weeks prior to surgery and may restart one day after. These herbal medications can increase risk of bleeding at surgical site.   Micah to follow up with Primary Care provider regarding elevated blood pressure.  Micah to follow up with Primary Care provider regarding elevated blood pressure.

## 2021-03-31 NOTE — PROGRESS NOTES
"PATIENT HISTORY:  Micah Lay is a 55 year old male who presents to clinic with a chief complaint of a painful right lower leg.  The patient relates stepping wrong off his deck and feeling a snap in the back of the right ankle.  The patient was seen in the ER where an MRI was taken showing a complete rupture of the achilles tendon. The patient was referred here for further evaluation and treatment.  Any previous notes and studies that pertain to the patient's condition were reviewed.    Pertinent medical, surgical and family history was reviewed in Epic chart and include Hypothyroidism    Medications:   Current Outpatient Medications:      calcium-magnesium (CALMAG) 500-250 MG TABS per tablet, Take 1 tablet by mouth 2 times daily, Disp: , Rfl:      levothyroxine (SYNTHROID/LEVOTHROID) 150 MCG tablet, TAKE 2 TABLETS (300 MCG) BY MOUTH DAILY NEED APPT AND LABS BEFORE FURTHER REFILLS, Disp: 60 tablet, Rfl: 0     Melatonin (MELATONIN TR) 10 MG TBCR, Take 1 tablet by mouth At Bedtime, Disp: , Rfl:      oxybutynin ER (DITROPAN-XL) 10 MG 24 hr tablet, TAKE 1 TABLET BY MOUTH EVERY DAY, Disp: 90 tablet, Rfl: 0     oxyCODONE (ROXICODONE) 5 MG tablet, Take 1 tablet (5 mg) by mouth every 6 hours as needed for moderate to severe pain, Disp: 12 tablet, Rfl: 0     pravastatin (PRAVACHOL) 40 MG tablet, TAKE 1 TABLET BY MOUTH EVERY DAY, Disp: 90 tablet, Rfl: 0     Vitamin D3 (CHOLECALCIFEROL) 25 mcg (1000 units) tablet, Take by mouth daily, Disp: , Rfl:      vitamin E (TOCOPHEROL) 100 units (90 mg) capsule, Take 100 Units by mouth daily, Disp: , Rfl:      Allergies:  No Known Allergies    Vitals: BP (!) 147/90   Pulse 68   Ht 1.727 m (5' 8\")   Wt 101.2 kg (223 lb)   BMI 33.91 kg/m    BMI= Body mass index is 33.91 kg/m .    LOWER EXTREMITY PHYSICAL EXAM    Dermatologic: Skin is intact to left lower extremities without significant lesions, rash or abrasion.        Vascular: DP & PT pulses are intact & regular on the left.   " CFT and skin temperature is normal to the left lower extremities.     Neurologic: Lower extremity sensation is intact to light touch.  No evidence of weakness in the left lower extremities.        Musculoskeletal: Patient is ambulatory without assistive device or brace.  No gross ankle deformity noted.  No foot or ankle joint effusion is noted.  Noted positive Garcia's test on the left lower extremity.    Diagnostics: MRI of the left lower extremity reveals a full-thickness complete rupture of the Achilles tendon with 6.7 cm gap.    ASSESSMENT / PLAN:     ICD-10-CM    1. Achilles tendon injury, right, initial encounter  S86.001A Orthopedic & Spine  Referral    right heel pain and discomfort.       I have explained to Micah about the conditions.  We discussed the underlying contributing factors of the condition as well as both conservative and surgical treatment options along with expected length of recovery.  I am recommending surgical treatment of the condition involving primary repair of the ruptured Achilles tendon on the left foot.  I informed the patient on the surgical technique involved as well the advantages and disadvantages associated the procedure.   We discussed the anticipated postoperative course and timeframe to return to normal activity in shoes.  The patient was instructed to not smoke or use nicotine patches before and after the surgery as this could result in poor outcomes due to slower healing potentially.  We discussd the possible development of a deep vein thrombosis (DVT) of the lower extremity and how this could lead to a pulmonary embolism (PE) that could be life threatening.  The patient was informed on DVT signs and symptoms as well as precautions to take to lessen the risk.  I informed the patient in risks and complications of the procedure including but not limited to infection, wound complications, swelling, pain, diminished range of motion with arthritis and diminished  function, DVT, reoccurrence of condition, and possible loss of limb.  There is moderate risk involved.  The procedure will be performed under general with popliteal block for anesthesia.  The patient will obtain a preoperative history and physical by the primary care provider.  Consents will be reviewed and signed on the day of surgery.    Micah verbalized agreement with and understanding of the rational for the diagnosis and treatment plan.  All questions were answered to best of my ability and the patient's satisfaction. The patient was advised to contact the clinic with any questions that may arise after the clinic visit.      Disclaimer: This note consists of symbols derived from keyboarding, dictation and/or voice recognition software. As a result, there may be errors in the script that have gone undetected. Please consider this when interpreting information found in this chart.       ANNABELLE Baird D.P.M., F.ANATOLY.C.F.A.S.

## 2021-03-31 NOTE — LETTER
March 31, 2021      Micah Lay  [address] [address] [address]        To Whom It May Concern:    Micah Lay was seen in our clinic. He is expected to be out of work for approximately three months for recovery from surgery and rehabilitation.        Sincerely,        Uri Baird DPM

## 2021-03-31 NOTE — LETTER
"    3/31/2021         RE: Micah Lay  [address] [address] [address]        Dear Colleague,    Thank you for referring your patient, Micah Lay, to the [facility] [place]. Please see a copy of my visit note below.    PATIENT HISTORY:  Micah Lay is a 55 year old male who presents to clinic with a chief complaint of a painful right lower leg.  The patient relates stepping wrong off his deck and feeling a snap in the back of the right ankle.  The patient was seen in the ER where an MRI was taken showing a complete rupture of the achilles tendon. The patient was referred here for further evaluation and treatment.  Any previous notes and studies that pertain to the patient's condition were reviewed.    Pertinent medical, surgical and family history was reviewed in Epic chart and include Hypothyroidism    Medications:   Current Outpatient Medications:      calcium-magnesium (CALMAG) 500-250 MG TABS per tablet, Take 1 tablet by mouth 2 times daily, Disp: , Rfl:      levothyroxine (SYNTHROID/LEVOTHROID) 150 MCG tablet, TAKE 2 TABLETS (300 MCG) BY MOUTH DAILY NEED APPT AND LABS BEFORE FURTHER REFILLS, Disp: 60 tablet, Rfl: 0     Melatonin (MELATONIN TR) 10 MG TBCR, Take 1 tablet by mouth At Bedtime, Disp: , Rfl:      oxybutynin ER (DITROPAN-XL) 10 MG 24 hr tablet, TAKE 1 TABLET BY MOUTH EVERY DAY, Disp: 90 tablet, Rfl: 0     oxyCODONE (ROXICODONE) 5 MG tablet, Take 1 tablet (5 mg) by mouth every 6 hours as needed for moderate to severe pain, Disp: 12 tablet, Rfl: 0     pravastatin (PRAVACHOL) 40 MG tablet, TAKE 1 TABLET BY MOUTH EVERY DAY, Disp: 90 tablet, Rfl: 0     Vitamin D3 (CHOLECALCIFEROL) 25 mcg (1000 units) tablet, Take by mouth daily, Disp: , Rfl:      vitamin E (TOCOPHEROL) 100 units (90 mg) capsule, Take 100 Units by mouth daily, Disp: , Rfl:      Allergies:  No Known Allergies    Vitals: BP (!) 147/90   Pulse 68   Ht 1.727 m (5' 8\")   Wt 101.2 kg (223 lb)   " BMI 33.91 kg/m    BMI= Body mass index is 33.91 kg/m .    LOWER EXTREMITY PHYSICAL EXAM    Dermatologic: Skin is intact to left lower extremities without significant lesions, rash or abrasion.        Vascular: DP & PT pulses are intact & regular on the left.   CFT and skin temperature is normal to the left lower extremities.     Neurologic: Lower extremity sensation is intact to light touch.  No evidence of weakness in the left lower extremities.        Musculoskeletal: Patient is ambulatory without assistive device or brace.  No gross ankle deformity noted.  No foot or ankle joint effusion is noted.  Noted positive Garcia's test on the left lower extremity.    Diagnostics: MRI of the left lower extremity reveals a full-thickness complete rupture of the Achilles tendon with 6.7 cm gap.    ASSESSMENT / PLAN:     ICD-10-CM    1. Achilles tendon injury, right, initial encounter  S86.001A Orthopedic & Spine  Referral    right heel pain and discomfort.       I have explained to Micah about the conditions.  We discussed the underlying contributing factors of the condition as well as both conservative and surgical treatment options along with expected length of recovery.  I am recommending surgical treatment of the condition involving primary repair of the ruptured Achilles tendon on the left foot.  I informed the patient on the surgical technique involved as well the advantages and disadvantages associated the procedure.   We discussed the anticipated postoperative course and timeframe to return to normal activity in shoes.  The patient was instructed to not smoke or use nicotine patches before and after the surgery as this could result in poor outcomes due to slower healing potentially.  We discussd the possible development of a deep vein thrombosis (DVT) of the lower extremity and how this could lead to a pulmonary embolism (PE) that could be life threatening.  The patient was informed on DVT signs and  symptoms as well as precautions to take to lessen the risk.  I informed the patient in risks and complications of the procedure including but not limited to infection, wound complications, swelling, pain, diminished range of motion with arthritis and diminished function, DVT, reoccurrence of condition, and possible loss of limb.  There is moderate risk involved.  The procedure will be performed under general with popliteal block for anesthesia.  The patient will obtain a preoperative history and physical by the primary care provider.  Consents will be reviewed and signed on the day of surgery.    Micah verbalized agreement with and understanding of the rational for the diagnosis and treatment plan.  All questions were answered to best of my ability and the patient's satisfaction. The patient was advised to contact the clinic with any questions that may arise after the clinic visit.      Disclaimer: This note consists of symbols derived from keyboarding, dictation and/or voice recognition software. As a result, there may be errors in the script that have gone undetected. Please consider this when interpreting information found in this chart.       CARLYLE Lira.P.M., F.A.C.F.A.S.        Again, thank you for allowing me to participate in the care of your patient.        Sincerely,        Uri Baird DPM

## 2021-03-31 NOTE — NURSING NOTE
"Chief Complaint   Patient presents with     Consult     MR 3/26, XR 3/26, Right achillies tendon injury \"fell off front step\"       Initial BP (!) 147/90   Pulse 68   Ht 1.727 m (5' 8\")   Wt 101.2 kg (223 lb)   BMI 33.91 kg/m   Estimated body mass index is 33.91 kg/m  as calculated from the following:    Height as of this encounter: 1.727 m (5' 8\").    Weight as of this encounter: 101.2 kg (223 lb).  Medications and allergies reviewed.      Cecille TADEO MA    "

## 2021-04-01 ENCOUNTER — TELEPHONE (OUTPATIENT)
Dept: PODIATRY | Facility: CLINIC | Age: 56
End: 2021-04-01

## 2021-04-01 ENCOUNTER — OFFICE VISIT (OUTPATIENT)
Dept: FAMILY MEDICINE | Facility: CLINIC | Age: 56
End: 2021-04-01
Payer: COMMERCIAL

## 2021-04-01 VITALS
SYSTOLIC BLOOD PRESSURE: 110 MMHG | HEART RATE: 74 BPM | DIASTOLIC BLOOD PRESSURE: 80 MMHG | WEIGHT: 222 LBS | BODY MASS INDEX: 33.65 KG/M2 | OXYGEN SATURATION: 98 % | TEMPERATURE: 97.9 F | RESPIRATION RATE: 16 BRPM | HEIGHT: 68 IN

## 2021-04-01 DIAGNOSIS — S86.001A ACHILLES TENDON INJURY, RIGHT, INITIAL ENCOUNTER: ICD-10-CM

## 2021-04-01 DIAGNOSIS — E03.9 HYPOTHYROIDISM, UNSPECIFIED TYPE: ICD-10-CM

## 2021-04-01 DIAGNOSIS — Z01.818 PRE-OPERATIVE EXAMINATION: Primary | ICD-10-CM

## 2021-04-01 DIAGNOSIS — Z11.59 ENCOUNTER FOR SCREENING FOR OTHER VIRAL DISEASES: ICD-10-CM

## 2021-04-01 PROCEDURE — 99214 OFFICE O/P EST MOD 30 MIN: CPT | Performed by: FAMILY MEDICINE

## 2021-04-01 ASSESSMENT — MIFFLIN-ST. JEOR: SCORE: 1821.49

## 2021-04-01 NOTE — PATIENT INSTRUCTIONS
Hold vitamins and supplement.   No NSAID's Ibuprofen, Aleve etc. To not be taken for 3 days prior to surgery   No aspirin until surgery   Able to continue other medications at this time.     Preparing for Your Surgery  Getting started  A nurse will call you to review your health history and instructions. They will give you an arrival time based on your scheduled surgery time.  Please be ready to share the following:    Your doctor's clinic name and phone number    Your medical, surgical and anesthesia history    A list of allergies and sensitivities    A list of medicines, including herbal treatments and over-the-counter drugs    Whether the patient has a legal guardian (ask how to send us the papers in advance)  If you have a child who's having surgery, please ask for a copy of Preparing for Your Child's Surgery.    Preparing for surgery    Within 30 days of surgery: Have a pre-op exam (sometimes called an H&P, or History and Physical). This can be done at a clinic or pre-operative center.  ? If you're having a , you may not need this exam. Talk to your care team    At your pre-op exam, talk to your care team about all medicines you take. If you need to stop any medicines before surgery, ask when to start taking them again.  ? We do this for your safety. Many medicines can make you bleed too much during surgery. Some change how well surgery (anesthesia) drugs work.    Call your insurance company to let them know you're having surgery. (If you don't have insurance, call [phone].)    Call your clinic if there's any change in your health. This includes signs of a cold or flu (sore throat, runny nose, cough, rash, fever). It also includes a scrape or scratch near the surgery site.    If you have questions on the day of surgery, call your hospital or surgery center.  Eating and drinking guidelines  For your safety: Unless your surgeon tells you otherwise, follow the guidelines below.    Eat and drink as  usual until 8 hours before surgery. After that, no food or milk.    Drink clear liquids until 2 hours before surgery. These are liquids you can see through, like water, Gatorade and Propel Water. You may also have black coffee and tea (no cream or milk).    Nothing by mouth within 2 hours of surgery. This includes gum, candy and breath mints.    If you drink, stop drinking alcohol the night before surgery.    If your care team tells you to take medicine on the morning of surgery, it's okay to take it with a sip of water.  Preventing infection    Shower or bathe the night before and morning of your surgery. Follow the instructions your clinic gave you. (If no instructions, use regular soap.)    Don't shave or clip hair near your surgery site. We'll remove the hair if needed.    Don't smoke or vape the morning of surgery. You may chew nicotine gum up to 2 hours before surgery. A nicotine patch is okay.  ? Note: Some surgeries require you to completely quit smoking and nicotine. Check with your surgeon.    Your care team will make every effort to keep you safe from infection. We will:  ? Clean our hands often with soap and water (or an alcohol-based hand rub).  ? Clean the skin at your surgery site with a special soap that kills germs.  ? Give you a special gown to keep you warm. (Cold raises the risk of infection.)  ? Wear special hair covers, masks, gowns and gloves during surgery.  ? Give antibiotic medicine, if prescribed. Not all surgeries need antibiotics.  What to bring on the day of surgery    Photo ID and insurance card    Copy of your health care directive, if you have one    Glasses and hearing aides (bring cases)  ? You can't wear contacts during surgery    Inhaler and eye drops, if you use them (tell us about these when you arrive)    CPAP machine or breathing device, if you use them    A few personal items, if spending the night    If you have . . .  ? A pacemaker or ICD (cardiac defibrillator): Bring the  ID card.  ? An implanted stimulator: Bring the remote control.  ? A legal guardian: Bring a copy of the certified (court-stamped) guardianship papers.  Please remove any jewelry, including body piercings. Leave jewelry and other valuables at home.  If you're going home the day of surgery  Important: If you don't follow the rules below, we must cancel your surgery.     Arrange for someone to drive you home after surgery. You may not drive, take a taxi or take public transportation by yourself (unless you'll have local anesthesia only).    Arrange for a responsible adult to stay with you overnight. If you don't, we may keep you in the hospital overnight, and you may need to pay the costs yourself.  Questions?   If you have any questions for your care team, list them here: _________________________________________________________________________________________________________________________________________________________________________________________________________________________________________________________________________________________________________________________  For informational purposes only. Not to replace the advice of your health care provider. Copyright   2003, 2019 [facility]. All rights reserved. Clinically reviewed by Hannah Diallo MD. Lov[place] [id number] - REV 4/20.

## 2021-04-01 NOTE — PROGRESS NOTES
[facility]  [address] [address]  Phone: [phone]  Primary Provider: Cassius Barriga  Pre-op Performing Provider: MATHEW MAS      PREOPERATIVE EVALUATION:  Today's date: 4/1/2021    Micah Lay is a 55 year old male who presents for a preoperative evaluation.    Surgical Information:  Surgery/Procedure: Achilles tendon repair, right  Surgery Location: [facility]  Surgeon: Oli  Surgery Date: 4/6/2021  Time of Surgery: 7:30am  Where patient plans to recover: At home with family  Fax number for surgical facility: Note does not need to be faxed, will be available electronically in Epic.    Type of Anesthesia Anticipated: General and popliteal block    Assessment & Plan     The proposed surgical procedure is considered INTERMEDIATE risk.    Pre-operative examination  Mets>4   No chest pain or shortness of breath at rest or with activity.   No history of blood clots.   No indication for labs or ECG  Discussed holding NSAID's  COVID test scheduled.     Achilles tendon injury, right, initial encounter  Surgery scheduled for 4/6/2021      Hypothyroidism  No recent TSH testing.  This was last completed on 1/6/2020 with a TSH of 0.37, free T4 1.35.  Patient is currently taking levothyroxine 300 mcg daily.  He has been hypothyroid since the age of 19 due to Hashimoto's thyroiditis.  We will recheck a TSH to ensure therapeutic.  As long as the TSH is not drastically outside of the norm patient will be cleared for surgery.    Medication Instructions:   - ibuprofen (Advil, Motrin): HOLD 1 day before surgery.    - naproxen (Aleve, Naprosyn): HOLD 4 days before surgery.    Hold vitamins and also supplements starting today until after surgery.     RECOMMENDATION:  APPROVAL GIVEN to proceed with proposed procedure, as long as thyroid testing within normal limits.     Subjective     HPI related to upcoming procedure:     Please see note by Dr. Baird on  3/31/2021. Complete achilles tendon tear.   Covid test is scheduled     Mets>4   No chest pain or shortness of breath at rest or with activity.   No prior blood clots   No issues with anesthesia in the past.   No tobacco     Takes quite a few vitamins and also supplements. Including testosterone supplementation. Discussed with patient he should hold these medications until after his surgical procedure.     Preop Questions 4/1/2021   1. Have you ever had a heart attack or stroke? No   2. Have you ever had surgery on your heart or blood vessels, such as a stent placement, a coronary artery bypass, or surgery on an artery in your head, neck, heart, or legs? No   3. Do you have chest pain with activity? No   4. Do you have a history of  heart failure? No   5. Do you currently have a cold, bronchitis or symptoms of other infection? No   6. Do you have a cough, shortness of breath, or wheezing? No   7. Do you or anyone in your family have previous history of blood clots? No   8. Do you or does anyone in your family have a serious bleeding problem such as prolonged bleeding following surgeries or cuts? No   9. Have you ever had problems with anemia or been told to take iron pills? No   10. Have you had any abnormal blood loss such as black, tarry or bloody stools? No   11. Have you ever had a blood transfusion? No   12. Are you willing to have a blood transfusion if it is medically needed before, during, or after your surgery? Yes   13. Have you or any of your relatives ever had problems with anesthesia? No   14. Do you have sleep apnea, excessive snoring or daytime drowsiness? No   15. Do you have any artifical heart valves or other implanted medical devices like a pacemaker, defibrillator, or continuous glucose monitor? No   16. Do you have artificial joints? No   17. Are you allergic to latex? No     Health Care Directive:  Patient does not have a Health Care Directive or Living Will: Reports having one, needs to be  updated     Preoperative Review of :   reviewed - controlled substances reflected in medication list.      Taking Norco at bedtime to help with sleep.       Review of Systems  Constitutional: Negative fevers, chills, night sweats  HEENT: Negative vision changes, hearing changes, difficulty with swallowing.  CV: Negative chest pain, palpitations.  Resp: Negative shortness of breath, significant cough, wheezing  GI: Negative nausea, vomiting, diarrhea, constipation, blood in stool  : Negative dysuria, hematuria, incontinence.  Integumentary: Negative rash   Psych: Negative mood changes       Patient Active Problem List    Diagnosis Date Noted     Achilles tendon injury, right, initial encounter 04/01/2021     Priority: Medium     Added automatically from request for surgery [id number]       Non-recurrent unilateral inguinal hernia without obstruction or gangrene 10/18/2018     Priority: Medium     Bladder spasms 09/15/2016     Priority: Medium     Hypothyroidism, unspecified type 05/25/2016     Priority: Medium     HYPERLIPIDEMIA LDL GOAL <130 10/31/2010     Priority: Medium     Family history of colon cancer 08/23/2010     Priority: Medium     Family history of aneurysm 08/23/2010     Priority: Medium     Screening for hypertension 11/03/2008     Priority: Medium      Past Medical History:   Diagnosis Date     Other and unspecified hyperlipidemia may 2006     Sprain of lumbar region      Unspecified hypothyroidism      Past Surgical History:   Procedure Laterality Date     LAPAROSCOPIC HERNIORRHAPHY INGUINAL BILATERAL Bilateral 6/12/2020    Procedure: Laparoscopic bilateral inguinal hernia repair with mesh;  Surgeon: Jd Wheatley MD;  Location: [facility]     Current Outpatient Medications   Medication Sig Dispense Refill     levothyroxine (SYNTHROID/LEVOTHROID) 150 MCG tablet TAKE 2 TABLETS (300 MCG) BY MOUTH DAILY NEED APPT AND LABS BEFORE FURTHER REFILLS 60 tablet 0     Melatonin (MELATONIN TR) 10 MG TBCR Take 1  "tablet by mouth At Bedtime       oxybutynin ER (DITROPAN-XL) 10 MG 24 hr tablet TAKE 1 TABLET BY MOUTH EVERY DAY 90 tablet 0     oxyCODONE (ROXICODONE) 5 MG tablet Take 1 tablet (5 mg) by mouth every 6 hours as needed for moderate to severe pain 12 tablet 0     pravastatin (PRAVACHOL) 40 MG tablet TAKE 1 TABLET BY MOUTH EVERY DAY 90 tablet 0     calcium-magnesium (CALMAG) 500-250 MG TABS per tablet Take 1 tablet by mouth 2 times daily       Vitamin D3 (CHOLECALCIFEROL) 25 mcg (1000 units) tablet Take by mouth daily       vitamin E (TOCOPHEROL) 100 units (90 mg) capsule Take 100 Units by mouth daily         No Known Allergies     Social History     Tobacco Use     Smoking status: Never Smoker     Smokeless tobacco: Never Used   Substance Use Topics     Alcohol use: Yes     Comment: rare         Objective     /80 (BP Location: Left arm, Patient Position: Chair, Cuff Size: Adult Large)   Pulse 74   Temp 97.9  F (36.6  C) (Tympanic)   Resp 16   Ht 1.727 m (5' 8\")   Wt 100.7 kg (222 lb)   SpO2 98%   BMI 33.75 kg/m      Physical Exam  General: Alert, cooperative, no acute distress  Head: Normocephalic, atraumatic   Eyes: PERRL, no scleral icterus, no conjunctival injection   Ears: External ear without deformity, external canals with cerumen bilaterally will proceed with washout.   Throat: Oropharynx clear, non-erythematous, tonsils non-enlarged. Mallampati class II-III  Neck: supple, trachea midline  CV: RRR, no murmur   Lungs: Clear, non-labored breathing, No rales or rhonchi   Abdomen: Bowel sounds active, soft, non-tender, no guarding.   Extremities: No peripheral edema, calves non-tender   MSK: Right foot in air cast.  Left calf without tenderness or swelling.   Neuro: CN II-XII grossly intact. No focal deficits. Sensation intact.       Diagnostics:  No labs were ordered during this visit.   No EKG required, no history of coronary heart disease, significant arrhythmia, peripheral arterial disease or other " structural heart disease.    Revised Cardiac Risk Index (RCRI):  The patient has the following serious cardiovascular risks for perioperative complications:   - No serious cardiac risks = 0 points     RCRI Interpretation: 0 points: Class I (very low risk - 0.4% complication rate)       Signed Electronically by: Juventino Jin DO  Copy of this evaluation report is provided to requesting physician.

## 2021-04-01 NOTE — H&P (VIEW-ONLY)
[facility]  [address] [address]  Phone: [phone]  Primary Provider: Cassius Barriga  Pre-op Performing Provider: MATHEW MAS      PREOPERATIVE EVALUATION:  Today's date: 4/1/2021    Micah Lay is a 55 year old male who presents for a preoperative evaluation.    Surgical Information:  Surgery/Procedure: Achilles tendon repair, right  Surgery Location: [facility]  Surgeon: Oli  Surgery Date: 4/6/2021  Time of Surgery: 7:30am  Where patient plans to recover: At home with family  Fax number for surgical facility: Note does not need to be faxed, will be available electronically in Epic.    Type of Anesthesia Anticipated: General and popliteal block    Assessment & Plan     The proposed surgical procedure is considered INTERMEDIATE risk.    Pre-operative examination  Mets>4   No chest pain or shortness of breath at rest or with activity.   No history of blood clots.   No indication for labs or ECG  Discussed holding NSAID's  COVID test scheduled.     Achilles tendon injury, right, initial encounter  Surgery scheduled for 4/6/2021      Hypothyroidism  No recent TSH testing.  This was last completed on 1/6/2020 with a TSH of 0.37, free T4 1.35.  Patient is currently taking levothyroxine 300 mcg daily.  He has been hypothyroid since the age of 19 due to Hashimoto's thyroiditis.  We will recheck a TSH to ensure therapeutic.  As long as the TSH is not drastically outside of the norm patient will be cleared for surgery.    Medication Instructions:   - ibuprofen (Advil, Motrin): HOLD 1 day before surgery.    - naproxen (Aleve, Naprosyn): HOLD 4 days before surgery.    Hold vitamins and also supplements starting today until after surgery.     RECOMMENDATION:  APPROVAL GIVEN to proceed with proposed procedure, as long as thyroid testing within normal limits.     Subjective     HPI related to upcoming procedure:     Please see note by Dr. Baird on  3/31/2021. Complete achilles tendon tear.   Covid test is scheduled     Mets>4   No chest pain or shortness of breath at rest or with activity.   No prior blood clots   No issues with anesthesia in the past.   No tobacco     Takes quite a few vitamins and also supplements. Including testosterone supplementation. Discussed with patient he should hold these medications until after his surgical procedure.     Preop Questions 4/1/2021   1. Have you ever had a heart attack or stroke? No   2. Have you ever had surgery on your heart or blood vessels, such as a stent placement, a coronary artery bypass, or surgery on an artery in your head, neck, heart, or legs? No   3. Do you have chest pain with activity? No   4. Do you have a history of  heart failure? No   5. Do you currently have a cold, bronchitis or symptoms of other infection? No   6. Do you have a cough, shortness of breath, or wheezing? No   7. Do you or anyone in your family have previous history of blood clots? No   8. Do you or does anyone in your family have a serious bleeding problem such as prolonged bleeding following surgeries or cuts? No   9. Have you ever had problems with anemia or been told to take iron pills? No   10. Have you had any abnormal blood loss such as black, tarry or bloody stools? No   11. Have you ever had a blood transfusion? No   12. Are you willing to have a blood transfusion if it is medically needed before, during, or after your surgery? Yes   13. Have you or any of your relatives ever had problems with anesthesia? No   14. Do you have sleep apnea, excessive snoring or daytime drowsiness? No   15. Do you have any artifical heart valves or other implanted medical devices like a pacemaker, defibrillator, or continuous glucose monitor? No   16. Do you have artificial joints? No   17. Are you allergic to latex? No     Health Care Directive:  Patient does not have a Health Care Directive or Living Will: Reports having one, needs to be  updated     Preoperative Review of :   reviewed - controlled substances reflected in medication list.      Taking Norco at bedtime to help with sleep.       Review of Systems  Constitutional: Negative fevers, chills, night sweats  HEENT: Negative vision changes, hearing changes, difficulty with swallowing.  CV: Negative chest pain, palpitations.  Resp: Negative shortness of breath, significant cough, wheezing  GI: Negative nausea, vomiting, diarrhea, constipation, blood in stool  : Negative dysuria, hematuria, incontinence.  Integumentary: Negative rash   Psych: Negative mood changes       Patient Active Problem List    Diagnosis Date Noted     Achilles tendon injury, right, initial encounter 04/01/2021     Priority: Medium     Added automatically from request for surgery [id number]       Non-recurrent unilateral inguinal hernia without obstruction or gangrene 10/18/2018     Priority: Medium     Bladder spasms 09/15/2016     Priority: Medium     Hypothyroidism, unspecified type 05/25/2016     Priority: Medium     HYPERLIPIDEMIA LDL GOAL <130 10/31/2010     Priority: Medium     Family history of colon cancer 08/23/2010     Priority: Medium     Family history of aneurysm 08/23/2010     Priority: Medium     Screening for hypertension 11/03/2008     Priority: Medium      Past Medical History:   Diagnosis Date     Other and unspecified hyperlipidemia may 2006     Sprain of lumbar region      Unspecified hypothyroidism      Past Surgical History:   Procedure Laterality Date     LAPAROSCOPIC HERNIORRHAPHY INGUINAL BILATERAL Bilateral 6/12/2020    Procedure: Laparoscopic bilateral inguinal hernia repair with mesh;  Surgeon: Jd Wheatley MD;  Location: [facility] OR     Current Outpatient Medications   Medication Sig Dispense Refill     levothyroxine (SYNTHROID/LEVOTHROID) 150 MCG tablet TAKE 2 TABLETS (300 MCG) BY MOUTH DAILY NEED APPT AND LABS BEFORE FURTHER REFILLS 60 tablet 0     Melatonin (MELATONIN TR) 10 MG TBCR Take 1  "tablet by mouth At Bedtime       oxybutynin ER (DITROPAN-XL) 10 MG 24 hr tablet TAKE 1 TABLET BY MOUTH EVERY DAY 90 tablet 0     oxyCODONE (ROXICODONE) 5 MG tablet Take 1 tablet (5 mg) by mouth every 6 hours as needed for moderate to severe pain 12 tablet 0     pravastatin (PRAVACHOL) 40 MG tablet TAKE 1 TABLET BY MOUTH EVERY DAY 90 tablet 0     calcium-magnesium (CALMAG) 500-250 MG TABS per tablet Take 1 tablet by mouth 2 times daily       Vitamin D3 (CHOLECALCIFEROL) 25 mcg (1000 units) tablet Take by mouth daily       vitamin E (TOCOPHEROL) 100 units (90 mg) capsule Take 100 Units by mouth daily         No Known Allergies     Social History     Tobacco Use     Smoking status: Never Smoker     Smokeless tobacco: Never Used   Substance Use Topics     Alcohol use: Yes     Comment: rare         Objective     /80 (BP Location: Left arm, Patient Position: Chair, Cuff Size: Adult Large)   Pulse 74   Temp 97.9  F (36.6  C) (Tympanic)   Resp 16   Ht 1.727 m (5' 8\")   Wt 100.7 kg (222 lb)   SpO2 98%   BMI 33.75 kg/m      Physical Exam  General: Alert, cooperative, no acute distress  Head: Normocephalic, atraumatic   Eyes: PERRL, no scleral icterus, no conjunctival injection   Ears: External ear without deformity, external canals with cerumen bilaterally will proceed with washout.   Throat: Oropharynx clear, non-erythematous, tonsils non-enlarged. Mallampati class II-III  Neck: supple, trachea midline  CV: RRR, no murmur   Lungs: Clear, non-labored breathing, No rales or rhonchi   Abdomen: Bowel sounds active, soft, non-tender, no guarding.   Extremities: No peripheral edema, calves non-tender   MSK: Right foot in air cast.  Left calf without tenderness or swelling.   Neuro: CN II-XII grossly intact. No focal deficits. Sensation intact.       Diagnostics:  No labs were ordered during this visit.   No EKG required, no history of coronary heart disease, significant arrhythmia, peripheral arterial disease or other " structural heart disease.    Revised Cardiac Risk Index (RCRI):  The patient has the following serious cardiovascular risks for perioperative complications:   - No serious cardiac risks = 0 points     RCRI Interpretation: 0 points: Class I (very low risk - 0.4% complication rate)       Signed Electronically by: Juventino Jin DO  Copy of this evaluation report is provided to requesting physician.

## 2021-04-01 NOTE — TELEPHONE ENCOUNTER
Reason for Call:  Form, our goal is to have forms completed with 7 days, however, some forms may require a visit or additional information.    Type of letter, form or note:  Loss of Time     Who is the form from?: Patient    Where did the form come from: Patient or family brought in       Phone number of person requesting form: [phone]  Can we leave a detailed message on this number:  NO    Desired completion date of form: 03/31/21      How will form be returned?:  fax to [phone]    Has the patient signed a consent form for release of information (may be included with form)? YES    Form was started 3/31/21 and faxed 04/01/21 for provider review/ completion at [facility].    Cecille Russell MA on 4/1/2021 at 11:11 AM

## 2021-04-02 DIAGNOSIS — E03.9 HYPOTHYROIDISM, UNSPECIFIED TYPE: ICD-10-CM

## 2021-04-02 DIAGNOSIS — Z11.59 ENCOUNTER FOR SCREENING FOR OTHER VIRAL DISEASES: ICD-10-CM

## 2021-04-02 LAB
SARS-COV-2 RNA RESP QL NAA+PROBE: NORMAL
SPECIMEN SOURCE: NORMAL
T4 FREE SERPL-MCNC: 1.59 NG/DL (ref 0.76–1.46)
TSH SERPL DL<=0.005 MIU/L-ACNC: 0.12 MU/L (ref 0.4–4)

## 2021-04-02 PROCEDURE — 36415 COLL VENOUS BLD VENIPUNCTURE: CPT | Performed by: FAMILY MEDICINE

## 2021-04-02 PROCEDURE — 84439 ASSAY OF FREE THYROXINE: CPT | Performed by: FAMILY MEDICINE

## 2021-04-02 PROCEDURE — U0003 INFECTIOUS AGENT DETECTION BY NUCLEIC ACID (DNA OR RNA); SEVERE ACUTE RESPIRATORY SYNDROME CORONAVIRUS 2 (SARS-COV-2) (CORONAVIRUS DISEASE [COVID-19]), AMPLIFIED PROBE TECHNIQUE, MAKING USE OF HIGH THROUGHPUT TECHNOLOGIES AS DESCRIBED BY CMS-2020-01-R: HCPCS | Performed by: PODIATRIST

## 2021-04-02 PROCEDURE — U0005 INFEC AGEN DETEC AMPLI PROBE: HCPCS | Performed by: PODIATRIST

## 2021-04-02 PROCEDURE — 84443 ASSAY THYROID STIM HORMONE: CPT | Performed by: FAMILY MEDICINE

## 2021-04-03 DIAGNOSIS — E03.9 HYPOTHYROIDISM, UNSPECIFIED TYPE: Primary | ICD-10-CM

## 2021-04-03 LAB
LABORATORY COMMENT REPORT: NORMAL
SARS-COV-2 RNA RESP QL NAA+PROBE: NEGATIVE
SPECIMEN SOURCE: NORMAL

## 2021-04-03 RX ORDER — LEVOTHYROXINE SODIUM 150 UG/1
TABLET ORAL
Qty: 60 TABLET | Refills: 0 | Status: SHIPPED | OUTPATIENT
Start: 2021-04-03 | End: 2021-05-10

## 2021-04-03 RX ORDER — LEVOTHYROXINE SODIUM 137 UG/1
TABLET ORAL
Qty: 60 TABLET | Refills: 1 | Status: SHIPPED | OUTPATIENT
Start: 2021-04-03 | End: 2021-11-10

## 2021-04-03 NOTE — RESULT ENCOUNTER NOTE
Thyroid level is elevated as T4 is high and TSH is low. Will need to decrease dose of Levothyroxine. I have sent a new prescription to [facility] in [facility] pharmacy. Plan to take 137mcg tablet along with a 150 mcg tablet daily to have a total of 287 mcg daily. Will plan to recheck TSH in 6 weeks.     This slight elevation is not a reason to push off surgery in my opinion. I will write a message to Dr. Baird to ensure he is aware of the situation.     Let me know if any questions.     Dr. Juventino Jin

## 2021-04-05 ENCOUNTER — ANESTHESIA EVENT (OUTPATIENT)
Dept: SURGERY | Facility: CLINIC | Age: 56
End: 2021-04-05
Payer: COMMERCIAL

## 2021-04-05 NOTE — ANESTHESIA PREPROCEDURE EVALUATION
Anesthesia Pre-Procedure Evaluation    Patient: Micah Lay   MRN: [id number] : 1965        Preoperative Diagnosis: Achilles tendon injury, right, initial encounter [S86.001A]   Procedure : Procedure(s):  Achilles tendon repair, right     Past Medical History:   Diagnosis Date     Other and unspecified hyperlipidemia may 2006     Sprain of lumbar region      Unspecified hypothyroidism       Past Surgical History:   Procedure Laterality Date     LAPAROSCOPIC HERNIORRHAPHY INGUINAL BILATERAL Bilateral 2020    Procedure: Laparoscopic bilateral inguinal hernia repair with mesh;  Surgeon: Jd Wheatley MD;  Location: [facility] OR      No Known Allergies   Social History     Tobacco Use     Smoking status: Never Smoker     Smokeless tobacco: Never Used   Substance Use Topics     Alcohol use: Yes     Comment: rare      Wt Readings from Last 1 Encounters:   21 100.7 kg (222 lb)        Anesthesia Evaluation   Pt has had prior anesthetic. Type: General.    No history of anesthetic complications       ROS/MED HX  ENT/Pulmonary:     (+) ROSA risk factors,     Neurologic:  - neg neurologic ROS     Cardiovascular:     (+) Dyslipidemia hypertension-----    METS/Exercise Tolerance:     Hematologic:  - neg hematologic  ROS     Musculoskeletal: Comment: Achilles tendon tear - neg musculoskeletal ROS     GI/Hepatic:  - neg GI/hepatic ROS     Renal/Genitourinary: Comment: Bladder spasms      Endo:     (+) thyroid problem, hypothyroidism,     Psychiatric/Substance Use:  - neg psychiatric ROS     Infectious Disease:       Malignancy:       Other:            Physical Exam    Airway        Mallampati: II   TM distance: > 3 FB   Neck ROM: full   Mouth opening: > 3 cm    Respiratory Devices and Support         Dental  no notable dental history         Cardiovascular   cardiovascular exam normal          Pulmonary   pulmonary exam normal                OUTSIDE LABS:  CBC:   Lab Results   Component Value Date    WBC 6.7  09/19/2012    HGB 14.7 04/27/2015    HGB 14.7 09/19/2012    HCT 42.9 09/19/2012     09/19/2012     BMP:   Lab Results   Component Value Date     06/28/2014     09/19/2012    POTASSIUM 4.1 06/28/2014    POTASSIUM 4.0 09/19/2012    CHLORIDE 107 06/28/2014    CHLORIDE 105 09/19/2012    CO2 24 06/28/2014    CO2 26 09/19/2012    BUN 25 (H) 06/28/2014    BUN 18 09/19/2012    CR 0.91 06/28/2014    CR 0.97 09/19/2012    GLC 81 05/21/2016    GLC 96 04/20/2015     COAGS: No results found for: PTT, INR, FIBR  POC: No results found for: BGM, HCG, HCGS  HEPATIC:   Lab Results   Component Value Date    ALBUMIN 4.6 09/19/2012    PROTTOTAL 7.9 09/19/2012    ALT 37 09/11/2017    AST 23 04/20/2015    ALKPHOS 90 09/19/2012    BILITOTAL 0.4 09/19/2012     OTHER:   Lab Results   Component Value Date    MIRIAM 9.0 06/28/2014    TSH 0.12 (L) 04/02/2021    T4 1.59 (H) 04/02/2021       Anesthesia Plan    ASA Status:  3      Anesthesia Type: General.     - Airway: ETT   Induction: Intravenous.   Maintenance: Balanced.        Consents    Anesthesia Plan(s) and associated risks, benefits, and realistic alternatives discussed. Questions answered and patient/representative(s) expressed understanding.     - Discussed with:  Patient      - Patient is DNR/DNI Status: No    Use of blood products discussed: No .     Postoperative Care    Pain management: Multi-modal analgesia.   PONV prophylaxis: Ondansetron (or other 5HT-3), Dexamethasone or Solumedrol     Comments:    Right popliteal block.            FLORENCE Scott CRNA

## 2021-04-06 ENCOUNTER — HOSPITAL ENCOUNTER (OUTPATIENT)
Facility: CLINIC | Age: 56
Discharge: HOME | End: 2021-04-06
Attending: PODIATRIST | Admitting: PODIATRIST
Payer: COMMERCIAL

## 2021-04-06 ENCOUNTER — ANESTHESIA (OUTPATIENT)
Dept: SURGERY | Facility: CLINIC | Age: 56
End: 2021-04-06
Payer: COMMERCIAL

## 2021-04-06 VITALS
DIASTOLIC BLOOD PRESSURE: 75 MMHG | WEIGHT: 222 LBS | BODY MASS INDEX: 33.65 KG/M2 | TEMPERATURE: 97.9 F | RESPIRATION RATE: 16 BRPM | HEIGHT: 68 IN | HEART RATE: 81 BPM | SYSTOLIC BLOOD PRESSURE: 113 MMHG | OXYGEN SATURATION: 93 %

## 2021-04-06 DIAGNOSIS — S86.001A ACHILLES TENDON INJURY, RIGHT, INITIAL ENCOUNTER: ICD-10-CM

## 2021-04-06 PROCEDURE — 250N000009 HC RX 250: Performed by: NURSE ANESTHETIST, CERTIFIED REGISTERED

## 2021-04-06 PROCEDURE — 250N000011 HC RX IP 250 OP 636: Performed by: NURSE ANESTHETIST, CERTIFIED REGISTERED

## 2021-04-06 PROCEDURE — 999N000141 HC STATISTIC PRE-PROCEDURE NURSING ASSESSMENT: Performed by: PODIATRIST

## 2021-04-06 PROCEDURE — 360N000076 HC SURGERY LEVEL 3, PER MIN: Performed by: PODIATRIST

## 2021-04-06 PROCEDURE — 27650 REPAIR ACHILLES TENDON: CPT | Mod: RT | Performed by: PODIATRIST

## 2021-04-06 PROCEDURE — 250N000025 HC SEVOFLURANE, PER MIN: Performed by: PODIATRIST

## 2021-04-06 PROCEDURE — 250N000011 HC RX IP 250 OP 636: Performed by: PODIATRIST

## 2021-04-06 PROCEDURE — 370N000017 HC ANESTHESIA TECHNICAL FEE, PER MIN: Performed by: PODIATRIST

## 2021-04-06 PROCEDURE — 710N000012 HC RECOVERY PHASE 2, PER MINUTE: Performed by: PODIATRIST

## 2021-04-06 PROCEDURE — 272N000001 HC OR GENERAL SUPPLY STERILE: Performed by: PODIATRIST

## 2021-04-06 PROCEDURE — 710N000009 HC RECOVERY PHASE 1, LEVEL 1, PER MIN: Performed by: PODIATRIST

## 2021-04-06 PROCEDURE — 258N000003 HC RX IP 258 OP 636: Performed by: NURSE ANESTHETIST, CERTIFIED REGISTERED

## 2021-04-06 PROCEDURE — 250N000013 HC RX MED GY IP 250 OP 250 PS 637: Performed by: NURSE ANESTHETIST, CERTIFIED REGISTERED

## 2021-04-06 RX ORDER — LIDOCAINE HYDROCHLORIDE 10 MG/ML
INJECTION, SOLUTION INFILTRATION; PERINEURAL PRN
Status: DISCONTINUED | OUTPATIENT
Start: 2021-04-06 | End: 2021-04-06

## 2021-04-06 RX ORDER — GLYCOPYRROLATE 0.2 MG/ML
INJECTION, SOLUTION INTRAMUSCULAR; INTRAVENOUS PRN
Status: DISCONTINUED | OUTPATIENT
Start: 2021-04-06 | End: 2021-04-06

## 2021-04-06 RX ORDER — NALOXONE HYDROCHLORIDE 0.4 MG/ML
0.4 INJECTION, SOLUTION INTRAMUSCULAR; INTRAVENOUS; SUBCUTANEOUS
Status: DISCONTINUED | OUTPATIENT
Start: 2021-04-06 | End: 2021-04-06 | Stop reason: HOSPADM

## 2021-04-06 RX ORDER — PROPOFOL 10 MG/ML
INJECTION, EMULSION INTRAVENOUS PRN
Status: DISCONTINUED | OUTPATIENT
Start: 2021-04-06 | End: 2021-04-06

## 2021-04-06 RX ORDER — PROPOFOL 10 MG/ML
INJECTION, EMULSION INTRAVENOUS CONTINUOUS PRN
Status: DISCONTINUED | OUTPATIENT
Start: 2021-04-06 | End: 2021-04-06

## 2021-04-06 RX ORDER — LIDOCAINE HYDROCHLORIDE AND EPINEPHRINE BITARTRATE 20; .01 MG/ML; MG/ML
INJECTION, SOLUTION SUBCUTANEOUS PRN
Status: DISCONTINUED | OUTPATIENT
Start: 2021-04-06 | End: 2021-04-06

## 2021-04-06 RX ORDER — AMOXICILLIN 250 MG
1-2 CAPSULE ORAL 2 TIMES DAILY
Qty: 30 TABLET | Refills: 0 | Status: SHIPPED | OUTPATIENT
Start: 2021-04-06 | End: 2021-04-19

## 2021-04-06 RX ORDER — EPHEDRINE SULFATE 50 MG/ML
INJECTION, SOLUTION INTRAVENOUS PRN
Status: DISCONTINUED | OUTPATIENT
Start: 2021-04-06 | End: 2021-04-06

## 2021-04-06 RX ORDER — HYDROXYZINE PAMOATE 25 MG/1
25 CAPSULE ORAL 3 TIMES DAILY PRN
Qty: 30 CAPSULE | Refills: 0 | Status: SHIPPED | OUTPATIENT
Start: 2021-04-06 | End: 2021-12-31

## 2021-04-06 RX ORDER — GABAPENTIN 300 MG/1
300 CAPSULE ORAL ONCE
Status: COMPLETED | OUTPATIENT
Start: 2021-04-06 | End: 2021-04-06

## 2021-04-06 RX ORDER — SODIUM CHLORIDE, SODIUM LACTATE, POTASSIUM CHLORIDE, CALCIUM CHLORIDE 600; 310; 30; 20 MG/100ML; MG/100ML; MG/100ML; MG/100ML
INJECTION, SOLUTION INTRAVENOUS CONTINUOUS
Status: DISCONTINUED | OUTPATIENT
Start: 2021-04-06 | End: 2021-04-06 | Stop reason: HOSPADM

## 2021-04-06 RX ORDER — FENTANYL CITRATE 50 UG/ML
INJECTION, SOLUTION INTRAMUSCULAR; INTRAVENOUS PRN
Status: DISCONTINUED | OUTPATIENT
Start: 2021-04-06 | End: 2021-04-06

## 2021-04-06 RX ORDER — CEFAZOLIN SODIUM 2 G/100ML
2 INJECTION, SOLUTION INTRAVENOUS
Status: DISCONTINUED | OUTPATIENT
Start: 2021-04-06 | End: 2021-04-06 | Stop reason: HOSPADM

## 2021-04-06 RX ORDER — ROPIVACAINE HYDROCHLORIDE 7.5 MG/ML
INJECTION, SOLUTION EPIDURAL; PERINEURAL PRN
Status: DISCONTINUED | OUTPATIENT
Start: 2021-04-06 | End: 2021-04-06

## 2021-04-06 RX ORDER — ONDANSETRON 4 MG/1
4 TABLET, ORALLY DISINTEGRATING ORAL EVERY 30 MIN PRN
Status: DISCONTINUED | OUTPATIENT
Start: 2021-04-06 | End: 2021-04-06 | Stop reason: HOSPADM

## 2021-04-06 RX ORDER — HYDROCODONE BITARTRATE AND ACETAMINOPHEN 5; 325 MG/1; MG/1
1-2 TABLET ORAL EVERY 4 HOURS PRN
Qty: 25 TABLET | Refills: 0 | Status: SHIPPED | OUTPATIENT
Start: 2021-04-06 | End: 2021-04-08

## 2021-04-06 RX ORDER — KETOROLAC TROMETHAMINE 30 MG/ML
INJECTION, SOLUTION INTRAMUSCULAR; INTRAVENOUS PRN
Status: DISCONTINUED | OUTPATIENT
Start: 2021-04-06 | End: 2021-04-06

## 2021-04-06 RX ORDER — DEXAMETHASONE SODIUM PHOSPHATE 4 MG/ML
INJECTION, SOLUTION INTRA-ARTICULAR; INTRALESIONAL; INTRAMUSCULAR; INTRAVENOUS; SOFT TISSUE PRN
Status: DISCONTINUED | OUTPATIENT
Start: 2021-04-06 | End: 2021-04-06

## 2021-04-06 RX ORDER — MAGNESIUM SULFATE HEPTAHYDRATE 40 MG/ML
2 INJECTION, SOLUTION INTRAVENOUS ONCE
Status: COMPLETED | OUTPATIENT
Start: 2021-04-06 | End: 2021-04-06

## 2021-04-06 RX ORDER — NALOXONE HYDROCHLORIDE 0.4 MG/ML
0.2 INJECTION, SOLUTION INTRAMUSCULAR; INTRAVENOUS; SUBCUTANEOUS
Status: DISCONTINUED | OUTPATIENT
Start: 2021-04-06 | End: 2021-04-06 | Stop reason: HOSPADM

## 2021-04-06 RX ORDER — FENTANYL CITRATE 50 UG/ML
25-50 INJECTION, SOLUTION INTRAMUSCULAR; INTRAVENOUS EVERY 5 MIN PRN
Status: DISCONTINUED | OUTPATIENT
Start: 2021-04-06 | End: 2021-04-06 | Stop reason: HOSPADM

## 2021-04-06 RX ORDER — ACETAMINOPHEN 325 MG/1
975 TABLET ORAL ONCE
Status: COMPLETED | OUTPATIENT
Start: 2021-04-06 | End: 2021-04-06

## 2021-04-06 RX ORDER — OXYCODONE HYDROCHLORIDE 5 MG/1
5 TABLET ORAL
Status: DISCONTINUED | OUTPATIENT
Start: 2021-04-06 | End: 2021-04-06 | Stop reason: HOSPADM

## 2021-04-06 RX ORDER — ONDANSETRON 2 MG/ML
INJECTION INTRAMUSCULAR; INTRAVENOUS PRN
Status: DISCONTINUED | OUTPATIENT
Start: 2021-04-06 | End: 2021-04-06

## 2021-04-06 RX ORDER — FENTANYL CITRATE 50 UG/ML
25-50 INJECTION, SOLUTION INTRAMUSCULAR; INTRAVENOUS
Status: DISCONTINUED | OUTPATIENT
Start: 2021-04-06 | End: 2021-04-06 | Stop reason: HOSPADM

## 2021-04-06 RX ORDER — LIDOCAINE HYDROCHLORIDE 10 MG/ML
INJECTION, SOLUTION EPIDURAL; INFILTRATION; INTRACAUDAL; PERINEURAL PRN
Status: DISCONTINUED | OUTPATIENT
Start: 2021-04-06 | End: 2021-04-06

## 2021-04-06 RX ORDER — ONDANSETRON 2 MG/ML
4 INJECTION INTRAMUSCULAR; INTRAVENOUS EVERY 30 MIN PRN
Status: DISCONTINUED | OUTPATIENT
Start: 2021-04-06 | End: 2021-04-06 | Stop reason: HOSPADM

## 2021-04-06 RX ORDER — MEPERIDINE HYDROCHLORIDE 25 MG/ML
12.5 INJECTION INTRAMUSCULAR; INTRAVENOUS; SUBCUTANEOUS
Status: DISCONTINUED | OUTPATIENT
Start: 2021-04-06 | End: 2021-04-06 | Stop reason: HOSPADM

## 2021-04-06 RX ORDER — CEFAZOLIN SODIUM 2 G/100ML
2 INJECTION, SOLUTION INTRAVENOUS SEE ADMIN INSTRUCTIONS
Status: DISCONTINUED | OUTPATIENT
Start: 2021-04-06 | End: 2021-04-06 | Stop reason: HOSPADM

## 2021-04-06 RX ORDER — LIDOCAINE 40 MG/G
CREAM TOPICAL
Status: DISCONTINUED | OUTPATIENT
Start: 2021-04-06 | End: 2021-04-06 | Stop reason: HOSPADM

## 2021-04-06 RX ORDER — KETAMINE HYDROCHLORIDE 10 MG/ML
INJECTION, SOLUTION INTRAMUSCULAR; INTRAVENOUS PRN
Status: DISCONTINUED | OUTPATIENT
Start: 2021-04-06 | End: 2021-04-06

## 2021-04-06 RX ADMIN — ROPIVACAINE HYDROCHLORIDE 30 ML: 7.5 INJECTION, SOLUTION EPIDURAL; PERINEURAL at 07:33

## 2021-04-06 RX ADMIN — MIDAZOLAM 2 MG: 1 INJECTION INTRAMUSCULAR; INTRAVENOUS at 07:27

## 2021-04-06 RX ADMIN — ROCURONIUM BROMIDE 50 MG: 10 INJECTION INTRAVENOUS at 07:47

## 2021-04-06 RX ADMIN — EPHEDRINE SULFATE 5 MG: 50 INJECTION, SOLUTION INTRAVENOUS at 08:26

## 2021-04-06 RX ADMIN — GABAPENTIN 300 MG: 300 CAPSULE ORAL at 06:34

## 2021-04-06 RX ADMIN — EPHEDRINE SULFATE 5 MG: 50 INJECTION, SOLUTION INTRAVENOUS at 08:21

## 2021-04-06 RX ADMIN — KETAMINE HYDROCHLORIDE 20 MG: 10 INJECTION INTRAMUSCULAR; INTRAVENOUS at 08:20

## 2021-04-06 RX ADMIN — MIDAZOLAM 1 MG: 1 INJECTION INTRAMUSCULAR; INTRAVENOUS at 07:34

## 2021-04-06 RX ADMIN — CEFAZOLIN SODIUM 2 G: 2 INJECTION, SOLUTION INTRAVENOUS at 07:55

## 2021-04-06 RX ADMIN — GLYCOPYRROLATE 0.2 MG: 0.2 INJECTION, SOLUTION INTRAMUSCULAR; INTRAVENOUS at 07:47

## 2021-04-06 RX ADMIN — EPHEDRINE SULFATE 5 MG: 50 INJECTION, SOLUTION INTRAVENOUS at 08:41

## 2021-04-06 RX ADMIN — EPHEDRINE SULFATE 10 MG: 50 INJECTION, SOLUTION INTRAVENOUS at 08:06

## 2021-04-06 RX ADMIN — KETOROLAC TROMETHAMINE 30 MG: 30 INJECTION, SOLUTION INTRAMUSCULAR at 08:27

## 2021-04-06 RX ADMIN — Medication 5 ML: at 07:32

## 2021-04-06 RX ADMIN — SODIUM CHLORIDE, POTASSIUM CHLORIDE, SODIUM LACTATE AND CALCIUM CHLORIDE: 600; 310; 30; 20 INJECTION, SOLUTION INTRAVENOUS at 07:07

## 2021-04-06 RX ADMIN — FENTANYL CITRATE 50 MCG: 50 INJECTION, SOLUTION INTRAMUSCULAR; INTRAVENOUS at 07:46

## 2021-04-06 RX ADMIN — MIDAZOLAM 1 MG: 1 INJECTION INTRAMUSCULAR; INTRAVENOUS at 07:30

## 2021-04-06 RX ADMIN — DEXAMETHASONE SODIUM PHOSPHATE 10 MG: 4 INJECTION, SOLUTION INTRA-ARTICULAR; INTRALESIONAL; INTRAMUSCULAR; INTRAVENOUS; SOFT TISSUE at 07:47

## 2021-04-06 RX ADMIN — LIDOCAINE HYDROCHLORIDE 1 ML: 10 INJECTION, SOLUTION EPIDURAL; INFILTRATION; INTRACAUDAL; PERINEURAL at 07:29

## 2021-04-06 RX ADMIN — PROPOFOL 200 MG: 10 INJECTION, EMULSION INTRAVENOUS at 07:46

## 2021-04-06 RX ADMIN — PROPOFOL 50 MCG/KG/MIN: 10 INJECTION, EMULSION INTRAVENOUS at 07:50

## 2021-04-06 RX ADMIN — FENTANYL CITRATE 50 MCG: 50 INJECTION, SOLUTION INTRAMUSCULAR; INTRAVENOUS at 07:30

## 2021-04-06 RX ADMIN — LIDOCAINE HYDROCHLORIDE 1 ML: 10 INJECTION, SOLUTION EPIDURAL; INFILTRATION; INTRACAUDAL; PERINEURAL at 07:34

## 2021-04-06 RX ADMIN — FENTANYL CITRATE 100 MCG: 50 INJECTION, SOLUTION INTRAMUSCULAR; INTRAVENOUS at 07:27

## 2021-04-06 RX ADMIN — LIDOCAINE HYDROCHLORIDE 50 MG: 10 INJECTION, SOLUTION INFILTRATION; PERINEURAL at 07:46

## 2021-04-06 RX ADMIN — ONDANSETRON 4 MG: 2 INJECTION INTRAMUSCULAR; INTRAVENOUS at 08:26

## 2021-04-06 RX ADMIN — ROPIVACAINE HYDROCHLORIDE 10 ML: 7.5 INJECTION, SOLUTION EPIDURAL; PERINEURAL at 07:36

## 2021-04-06 RX ADMIN — ACETAMINOPHEN 975 MG: 325 TABLET, FILM COATED ORAL at 06:34

## 2021-04-06 RX ADMIN — LIDOCAINE HYDROCHLORIDE 0.1 ML: 10 INJECTION, SOLUTION EPIDURAL; INFILTRATION; INTRACAUDAL; PERINEURAL at 07:06

## 2021-04-06 RX ADMIN — MAGNESIUM SULFATE HEPTAHYDRATE 2 G: 2 INJECTION, SOLUTION INTRAVENOUS at 07:07

## 2021-04-06 ASSESSMENT — MIFFLIN-ST. JEOR: SCORE: 1821.49

## 2021-04-06 NOTE — BRIEF OP NOTE
[facility]    Brief Operative Note    Pre-operative diagnosis: Achilles tendon injury, right, initial encounter [S86.001A]  Post-operative diagnosis Same as pre-operative diagnosis    Procedure: Procedure(s):  Achilles tendon repair, right  Surgeon: Surgeon(s) and Role:     * Uri Baird, GITA - Primary  Anesthesia: Combined General with Popliteal Block   Estimated blood loss: Less than 10 ml  Drains: None  Specimens: * No specimens in log *  Findings:   None.  Complications: None.  Implants: * No implants in log *

## 2021-04-06 NOTE — OP NOTE
PREOPERATIVE DIAGNOSIS: 1.  Ruptured Achilles tendon, right foot.   POSTOPERATIVE DIAGNOSIS: 1. Ruptured Achilles tendon, right foot.   PROCEDURE: 1. Primary repair of Achilles tendon rupture, right foot.   PATHOLOGY: None.   ANESTHESIA: General with popliteal block   ESTIMATED BLOOD LOSS: Less than 10 mL   INDICATIONS FOR PROCEDURE: Micah Lay is a 55 year old-year-old male with a complete rupture of the Achilles tendon of the right lower extremity. The proposed surgery will entail performing a primary repair of the Achilles tendon rupture on the right foot. I informed the patient on the surgical technique involved as well the advantages and disadvantages associated the procedure.   We discussed the anticipated postoperative course and timeframe to return to normal activity in shoes.  The patient was instructed to not smoke or use nicotine patches after the surgery as this could result in poor outcomes due to slower healing potentially.  We discussd the possible development of a deep vein thrombosis (DVT) of the lower extremity and how this could lead to a pulmonary embolism (PE) that could be life threatening.  The patient was informed on DVT signs and symptoms as well as precautions to take to lessen the risk.  I informed the patient in risks and complications of the procedure including but not limited to infection, wound complications, swelling, pain, diminished range of motion with arthritis and diminished function, DVT, reoccurrence of condition, and possible loss of limb and death.  There is moderate risk involved.   The patient was consented for primary repair of an Achilles tendon rupture, right foot.   PROCEDURE IN DETAIL: Under mild sedation, the patient in the operating room.  After general anesthesia was obtained, the patient was placed on the operating table in the prone position. Pneumatic thigh tourniquet was placed around the patient's right thigh.  The foot was then scrubbed, prepped and  draped in the usual aseptic manner. Esmarch bandage was utilized to exsanguinate the patient's right lower extremity, and the pneumatic thigh tourniquet was inflated to 250 PSI.   Following this, an operative time out was performed according to our institution's policy which confirmed the laterality of the procedure. Preoperative antibiotics were administered to the patient prior to arrival to the OR.     The procedure began with a linear longitudinal incision over the posterior aspect of the distal lower leg centered over the midline of the underlying Achilles tendon on the right.  The incision was made full thickness down to subcutaneous tissue with care taken to avoid all vital neurovascular structures.  Any active bleeders were cauterized and ligated as needed.  Further dissection was carried down to the Achilles tendon where the two sections of achilles tendon was identified and sharply debrided of damaged tendon tissue.  Next the two ends of the tendon had an Arthrex #2 Fiberwire woven in a Krackow fashion.  The two ends of the tendon were reapproximated securely with the Fiberwire suture. The frayed ends were sutured with 3-0 vicryl suture.  The wound was irrigated.  The paratenon tissue was reapproximated over the Achilles tendon with 5-0 prolene in a running stitch.    The wound was irrigated with copious amounts of normal sterile saline.  The subcutaneous tissue was reapproximated utilizing 3-0 Vicryl. The skin was reapproximated utilizing 3-0 nylon suture in a horizontal mattress technique.  The wound was dressed with sterile Jumpstart dressing, 4 x 4s, cast padding, a posterior splint and an Ace wrap.   The patient tolerated these procedures well and was transferred from the operative table to the transport cart and taken from the OR to the PACU.  In PACU, patient and staff given orders and instructions for post-operative care in the following areas: post op pain management, weight-bearing status,  dressing/splint care, weight-bearing assistive devices, elevation of the extremity, ice/cold therapy, DVT/blood clot prevention, nutrition and post-operative concerns to be aware.  Case and post-operative care measures were reviewed with the patient and family members present.  A post operative instruction sheet was provided.  If concerns or questions arise they will contact our clinic.  If acute concerns develop they will present emergently to a nearby medical center.      SCOTT PRINCE DPM, FACFAS

## 2021-04-06 NOTE — DISCHARGE INSTRUCTIONS
Same Day Surgery Discharge Instructions  Special Precautions After Surgery - Adult    1. It is not unusual to feel lightheaded or faint, up to 24 hours after surgery or while taking pain medication.  If you have these symptoms; sit for a few minutes before standing and have someone assist you when getting up.  2. You should rest and relax for the next 24 hours and must have someone stay with you for at least 24 hours after your discharge.  3. DO NOT DRIVE any vehicle or operate mechanical equipment for 24 hours following the end of your surgery.  DO NOT DRIVE while taking narcotic pain medications that have been prescribed by your physician.  If you had a limb operated on, you must be able to use it fully to drive.  4. DO NOT drink alcoholic beverages for 24 hours following surgery or while taking prescription pain medication.  5. Drink clear liquids (apple juice, ginger ale, broth, 7-Up, etc.).  Progress to your regular diet as you feel able.  6. Any questions call your physician and do not make important decisions for 24 hours.      Breakthrough Bleeding    How/why does it occur?   There are many causes of breakthrough bleeding onto your dressing. The two most common causes are increased activity and increased NSAID use.     How is it treated?   The treatment for breakthrough dressing bleeding depends on the cause. For simple problems such as a saturated dressing, you may need to reinforce the dressing with more gauze and tape and put slight pressure on the site.  Call your healthcare provider if something else is causing bleeding.      Nausea and Vomiting: Nausea and vomiting can occur any time after receiving anesthesia. If you experience nausea and vomiting we encourage you to move to a clear liquid diet and advance your diet as tolerated. If nausea and vomiting do not improve within 12 hours please call the surgeon or present to the Emergency department.     Break-through Bleeding: If your  experience bleeding from your surgical site apply pressure and additional dressing per nurse instruction. For simple problems such as a saturated dressing, you may need to reinforce the dressing with more gauze and tape and put slight pressure on the site. If bleeding does not subside contact the surgeon or present to the Emergency Department.    Post-op Infection: If you develop a fever of 100.4 or greater, have pus like drainage, redness, swelling or severe pain at the surgical site not alleviated with pain medications; please contact the surgeon or present to the Emergency Department.  Post-operative Infection  What is a wound infection?    watch for signs of infection. Signs that the wound/incision is infected include:   Pus or cloudy fluid draining from the incision.   A pimple or yellow crust forming on the incision   The scab is increasing in size.   Increasing redness occurs around the incisions  A red streak is spreading from the wound toward the heart.   The incision has become extremely tender and/or hot   The lymph node draining that area of skin may become large and tender.   You may develop a fever over 100?F (37.8?C).     What is the cause?   Most skin infections follow breaks in the skin (for example, surgery,  from cuts, puncture wounds, animal bites, splinters, thorns, or burns). Bacteria (especially staphylococcus or streptococcus) then invade the wound and cause the infection.    Deeper wounds (like surgical incisions) are much more likely to become infected than superficial wounds (for example, scrapes).     What is the treatment?   Call your doctor's clinic if you feel you have the beginnings of an infection   Antibiotics You will probably need antibiotics prescribed by your healthcare provider. This medicine will kill the germs that are causing the wound infection. Try not to forget any of the doses.  Even if you feel better in a few days, take the antibiotic until it is completely gone to  keep the infection from flaring up again.    Fever and pain relief Take acetaminophen or ibuprofen if you develop a fever over 102?F (39?C)    How can I help prevent infections?   Wash around all new incisions vigorously with soap and water for 5 to 10 minutes to remove dirt and bacteria.      When should I call my healthcare provider?   Call IMMEDIATELY if:   The redness keeps spreading.   The wound becomes extremely painful.   Call during office hours if:   The fever is not gone 48 hours after you start taking an antibiotic.   The wound infection does not look better 3 days after your start taking an antibiotic.   The wound isn't completely healed within 10 days.   You have other questions or concerns.     __________________________________________________________________________________________________________________________________  IMPORTANT NUMBERS:    [facility] Main Number:  [phone], [phone]  Pharmacy:  [phone]  Same Day Surgery:  [phone], Monday - Friday until 8:30 p.m.  Urgent Care:  [phone]  Emergency Room:  [phone]      [facility]:  [phone]                                                                             [facility]:  [phone] option 1  [facility]:  [phone]     OB Clinic:  [phone]   [facility]:  [phone]   Home Medical Equipment: [phone]  [facility]:  [phone]

## 2021-04-06 NOTE — ANESTHESIA PROCEDURE NOTES
Sciatic, saphenous and popliteal Procedure Note  Pre-Procedure   Staff -        CRNA: Paco Modi APRN CRNA       Performed By: CRNA       Location: pre-op       Pre-Anesthestic Checklist: patient identified, IV checked, site marked, risks and benefits discussed, informed consent, monitors and equipment checked, pre-op evaluation, at physician/surgeon's request and post-op pain management  Timeout:       Correct Patient: Yes        Correct Procedure: Yes        Correct Site: Yes        Correct Position: Yes        Correct Laterality: Yes        Site Marked: Yes  Procedure Documentation  Procedure: sciatic, saphenous, popliteal       Laterality: right       Patient Position: prone       Patient Prep/Sterile Barriers: sterile gloves, mask, patient draped       Skin prep: Chloraprep      Local skin infiltrated with mL of 1% lidocaine.        Needle Type: insulated and short bevel       Needle Gauge: 20.        Needle Length (Inches): 4        - Ultrasound guided       - Ultrasound used to identify targeted nerve, plexus, vascular marker, or fascial plane and place a needle adjacent to it in real-time       - Ultrasound was used to visualize the spread of anesthetic in close proximity to the above referenced structure       - A permanent image is entered into the patient's record.       - The nerve(s) appeared anatomically normal       - There were no apparent abnormal pathologic findings       Nerve Stim: Initial Level 1 mA.  Lowest motor response 0.44 mA.    Assessment/Narrative         The placement was negative for: blood aspirated, painful injection and site bleeding       Paresthesias: No.    Test dose of mL at.         Test dose negative, 3 minutes after injection, for signs of intravascular, subdural, or intrathecal injection.      Bolus given via needle. No blood aspirated via catheter.        Secured via.        Insertion/Infusion Method: Single Shot       Complications: none       Injection made  incrementally with aspirations every 5 mL.    Comments:  Total volume injected at Sciatic nerve:30    Total volume injected at Saphenous Nerve:10

## 2021-04-06 NOTE — ANESTHESIA POSTPROCEDURE EVALUATION
Patient: Micah Lay    Procedure(s):  Achilles tendon repair, right    Diagnosis:Achilles tendon injury, right, initial encounter [S86.001A]  Diagnosis Additional Information: No value filed.    Anesthesia Type:  General    Note:  Disposition: Outpatient   Postop Pain Control: Uneventful            Sign Out: Well controlled pain   PONV: No   Neuro/Psych: Uneventful            Sign Out: Acceptable/Baseline neuro status   Airway/Respiratory: Uneventful            Sign Out: Acceptable/Baseline resp. status   CV/Hemodynamics: Uneventful            Sign Out: Acceptable CV status   Other NRE: NONE   DID A NON-ROUTINE EVENT OCCUR? No         Last vitals:  Vitals:    04/06/21 0950 04/06/21 0953 04/06/21 1000   BP: 129/87  123/73   Pulse: 77     Resp: 13  14   Temp: 36.6  C (97.9  F)     SpO2: 96% 96% 94%       Last vitals prior to Anesthesia Care Transfer:  CRNA VITALS  4/6/2021 0841 - 4/6/2021 0941      4/6/2021             Pulse:  86    SpO2:  96 %    Resp Rate (observed):  10          Electronically Signed By: FLORENCE Carroll CRNA  April 6, 2021  10:19 AM

## 2021-04-06 NOTE — ANESTHESIA PROCEDURE NOTES
Airway       Patient location during procedure: OR  Staff -        CRNA: Idalmis Tracy APRN CRNA       Performed By: CRNA  Consent for Airway        Urgency: elective  Indications and Patient Condition       Indications for airway management: geoffrey-procedural       Induction type:intravenous       Mask difficulty assessment: 1 - vent by mask    Final Airway Details       Final airway type: endotracheal airway       Successful airway: ETT - single  Endotracheal Airway Details        ETT size (mm): 7.5       Cuffed: yes       Cuff volume (mL): 7       Successful intubation technique: direct laryngoscopy       DL Blade Type: Becker 2       Grade View of Cords: 1       Adjucts: stylet       Position: Right       Measured from: gums/teeth       Secured at (cm): 23       Bite block used: None    Post intubation assessment        Placement verified by: capnometry, equal breath sounds and chest rise        Number of attempts at approach: 1       Number of other approaches attempted: 0       Secured with: silk tape       Ease of procedure: easy       Dentition: Intact and Unchanged    Medication(s) Administered   Medication Administration Time: 4/6/2021 7:50 AM

## 2021-04-07 ENCOUNTER — TELEPHONE (OUTPATIENT)
Dept: PODIATRY | Facility: CLINIC | Age: 56
End: 2021-04-07

## 2021-04-07 NOTE — TELEPHONE ENCOUNTER
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    Please call patient back to verify completion.    Rivka Aguiar   [facility]  [facility]

## 2021-04-08 ENCOUNTER — TELEPHONE (OUTPATIENT)
Dept: FAMILY MEDICINE | Facility: CLINIC | Age: 56
End: 2021-04-08

## 2021-04-08 DIAGNOSIS — S86.001A ACHILLES TENDON INJURY, RIGHT, INITIAL ENCOUNTER: ICD-10-CM

## 2021-04-08 DIAGNOSIS — Z98.890 STATUS POST RIGHT FOOT SURGERY: Primary | ICD-10-CM

## 2021-04-08 RX ORDER — HYDROCODONE BITARTRATE AND ACETAMINOPHEN 5; 325 MG/1; MG/1
1-2 TABLET ORAL EVERY 4 HOURS PRN
Qty: 25 TABLET | Refills: 0 | Status: SHIPPED | OUTPATIENT
Start: 2021-04-08 | End: 2021-04-15

## 2021-04-08 NOTE — TELEPHONE ENCOUNTER
Reason for Call:  Other prescription    Detailed comments: patient is asking provider if he would refill his pain medication until he can be seen for post op on 04/12/2021   Please call pt to let him know if this will be refill   Thank you    Phone Number Patient can be reached at: Cell number on file:    Telephone Information:   Mobile [phone]       Best Time: any    Can we leave a detailed message on this number? YES    Call taken on 4/8/2021 at 3:01 PM by Rachael Blackmon

## 2021-04-10 ENCOUNTER — NURSE TRIAGE (OUTPATIENT)
Dept: NURSING | Facility: CLINIC | Age: 56
End: 2021-04-10

## 2021-04-11 NOTE — TELEPHONE ENCOUNTER
"Surgery on achilles tendon right leg with Dr Baird on 4/6/2021. RX Hydrocodone-acetaminophen for pain. He states he had been taking 1-2 tablets every 4 hrs, and cannot sleep, having to go to the bathroom every hour and half- 2 hrs, not urinating a whole lot at a time. He feels empty after he has urinated He is not drinking a lot of fluids, does not know why he has to urinate so often, during the daytime as well. No burning or pain with urination, just frequency. He usually urinates 2 times every night. He takes Oxybutinin and thinks it is not helping, \" it worked for awhile, but it is not working now\".    He states he is not in pain. He has been applying ice pack to his foot (surgical area). He says he has been decreasing his pain medication. He is taking a stool softener, last BM 2 days ago. He says he doesn't feel very constipated--he is passing gas, and feels like he will have a BM soon.     He feels very tired, not sleeping, wants to know if he can take OTC medication for sleep ? Hx of Melatonin use, 5- 10mg every night (see historical medication, it is still on his current medication list). He states he has gone the last 2-3 nights with poor sleep.    Triaged to a disposition of See provider within 24 hrs. He intends to go to  tomorrow when they reopen.     Jessika Lynn RN Triage Nurse Advisor 9:12 PM 4/10/2021    Reason for Disposition    Requesting medication for sleep (\"sleeping pill\")    Additional Information    Negative: Difficulty breathing    Negative: Depression is suspected    Negative: Traumatic Brain Injury (TBI) is suspected    Negative: Alcohol  abuse or dependence is suspected    Negative: Substance abuse or dependence suspected    Negative: [1] Pain is causing insomnia AND [2] pain is not a chronic symptom (recurrent or ongoing AND present > 4 weeks)    Negative: Awakened  by jerking leg movements    Negative: [1] Restless legs or unpleasant feeling in legs AND [2] causes insomnia    " Negative: Loud snoring is an ongoing problem  (> 2 weeks)    Negative: Excessive daytime sleepiness is an ongoing problem  (> 2 weeks)    Negative: Insomnia is an ongoing problem (> 2 weeks)    Negative: [1] Insomnia persists > 1 week AND [2] no improvement after using CARE ADVICE    Negative: Insomnia interferes with work or school    Negative: [1] Pain is causing insomnia AND [2] pain is a chronic symptom (recurrent or ongoing AND present > 4 weeks)    Difficulty falling to sleep or staying asleep    Over-the-counter sleeping pills, questions about    Melatonin, questions about    Negative: Shock suspected (e.g., cold/pale/clammy skin, too weak to stand, low BP, rapid pulse)    Negative: Sounds like a life-threatening emergency to the triager    Negative: Followed a genital area injury    Negative: Followed a genital area injury (penis, scrotum)    Negative: Vaginal discharge    Negative: Pus (white, yellow) or bloody discharge from end of penis    Negative: [1] Taking antibiotic for urinary tract infection (UTI) AND [2] female    Negative: [1] Taking antibiotic for urinary tract infection (UTI) AND [2] male    Negative: [1] Discomfort (pain, burning or stinging) when passing urine AND [2] pregnant    Negative: [1] Discomfort (pain, burning or stinging) when passing urine AND [2] postpartum (from 0 to 6 weeks after delivery)    Negative: [1] Discomfort (pain, burning or stinging) when passing urine AND [2] female    Negative: [1] Discomfort (pain, burning or stinging) when passing urine AND [2] male    Negative: Pain or itching in the vulvar area    Negative: Pain in scrotum is main symptom    Negative: Blood in the urine is main symptom    Negative: Symptoms arising from use of a urinary catheter (Taylor or Coude)    Negative: [1] Unable to urinate (or only a few drops) > 4 hours AND     [2] bladder feels very full (e.g., palpable bladder or strong urge to urinate)    Negative: [1] Decreased urination and [2]  drinking very little AND [2] dehydration suspected (e.g., dark urine, no urine > 12 hours, very dry mouth, very lightheaded)    Negative: Patient sounds very sick or weak to the triager    Negative: Fever > 100.5 F (38.1 C)    Negative: Side (flank) or lower back pain present    Negative: [1] Can't control passage of urine (i.e., urinary incontinence) AND [2] new onset (< 2 weeks) or worsening    Urinating more frequently than usual (i.e., frequency)    Protocols used: INSOMNIA-A-AH, URINARY SYMPTOMS-A-AH  COVID 19 Nurse Triage Plan/Patient Instructions    Please be aware that novel coronavirus (COVID-19) may be circulating in the community. If you develop symptoms such as fever, cough, or SOB or if you have concerns about the presence of another infection including coronavirus (COVID-19), please contact your health care provider or visit [email or web address].     Disposition/Instructions    In-Person Visit with provider recommended. Reference Visit Selection Guide.    Thank you for taking steps to prevent the spread of this virus.  o Limit your contact with others.  o Wear a simple mask to cover your cough.  o Wash your hands well and often.    Resources    [facility]: About COVID-19: [email or web address]    CDC: What to Do If You're Sick: www.cdc.gov/coronavirus/2019-ncov/about/steps-when-sick.html    CDC: Ending Home Isolation: www.cdc.gov/coronavirus/2019-ncov/hcp/disposition-in-home-patients.html     CDC: Caring for Someone: www.cdc.gov/coronavirus/2019-ncov/if-you-are-sick/care-for-someone.html     [facility]: Interim Guidance for Hospital Discharge to Home: www.health.[facility].[place]/diseases/coronavirus/hcp/hospdischarge.pdf    [facility] clinical trials (COVID-19 research studies): [email or web address].[facility]/umn-clinical-trials     Below are the COVID-19 hotlines at the [place] Department of [facility] ([facility]). Interpreters are available.   o For health questions: Call 123-698-3194 or  1-594.672.8493 (7 a.m. to 7 p.m.)  o For questions about schools and childcare: Call 660-367-5548 or 1-622.512.2055 (7 a.m. to 7 p.m.)

## 2021-04-12 ENCOUNTER — OFFICE VISIT (OUTPATIENT)
Dept: PODIATRY | Facility: CLINIC | Age: 56
End: 2021-04-12
Payer: COMMERCIAL

## 2021-04-12 ENCOUNTER — HOSPITAL ENCOUNTER (EMERGENCY)
Facility: CLINIC | Age: 56
Discharge: HOME | End: 2021-04-12
Attending: PHYSICIAN ASSISTANT | Admitting: PHYSICIAN ASSISTANT
Payer: COMMERCIAL

## 2021-04-12 VITALS
WEIGHT: 222 LBS | TEMPERATURE: 97.4 F | HEART RATE: 95 BPM | DIASTOLIC BLOOD PRESSURE: 101 MMHG | SYSTOLIC BLOOD PRESSURE: 156 MMHG | OXYGEN SATURATION: 97 % | RESPIRATION RATE: 20 BRPM | BODY MASS INDEX: 33.76 KG/M2

## 2021-04-12 VITALS
HEIGHT: 68 IN | BODY MASS INDEX: 33.65 KG/M2 | HEART RATE: 83 BPM | SYSTOLIC BLOOD PRESSURE: 154 MMHG | DIASTOLIC BLOOD PRESSURE: 101 MMHG | WEIGHT: 222 LBS

## 2021-04-12 DIAGNOSIS — R35.1 NOCTURIA: ICD-10-CM

## 2021-04-12 DIAGNOSIS — Z98.890 STATUS POST RIGHT FOOT SURGERY: ICD-10-CM

## 2021-04-12 DIAGNOSIS — S86.001A ACHILLES TENDON INJURY, RIGHT, INITIAL ENCOUNTER: Primary | ICD-10-CM

## 2021-04-12 LAB
ALBUMIN UR-MCNC: NEGATIVE MG/DL
APPEARANCE UR: ABNORMAL
BILIRUB UR QL STRIP: NEGATIVE
COLOR UR AUTO: ABNORMAL
GLUCOSE UR STRIP-MCNC: NEGATIVE MG/DL
HGB UR QL STRIP: NEGATIVE
KETONES UR STRIP-MCNC: 5 MG/DL
LEUKOCYTE ESTERASE UR QL STRIP: NEGATIVE
MUCOUS THREADS #/AREA URNS LPF: PRESENT /LPF
NITRATE UR QL: NEGATIVE
PH UR STRIP: 5 PH (ref 5–7)
RBC #/AREA URNS AUTO: 1 /HPF (ref 0–2)
SOURCE: ABNORMAL
SP GR UR STRIP: 1.03 (ref 1–1.03)
SQUAMOUS #/AREA URNS AUTO: <1 /HPF (ref 0–1)
UROBILINOGEN UR STRIP-MCNC: 0 MG/DL (ref 0–2)
WBC #/AREA URNS AUTO: <1 /HPF (ref 0–5)

## 2021-04-12 PROCEDURE — 81001 URINALYSIS AUTO W/SCOPE: CPT | Performed by: PHYSICIAN ASSISTANT

## 2021-04-12 PROCEDURE — 87086 URINE CULTURE/COLONY COUNT: CPT | Performed by: PHYSICIAN ASSISTANT

## 2021-04-12 PROCEDURE — 99024 POSTOP FOLLOW-UP VISIT: CPT | Performed by: PODIATRIST

## 2021-04-12 PROCEDURE — G0463 HOSPITAL OUTPT CLINIC VISIT: HCPCS | Performed by: PHYSICIAN ASSISTANT

## 2021-04-12 PROCEDURE — 99213 OFFICE O/P EST LOW 20 MIN: CPT | Performed by: PHYSICIAN ASSISTANT

## 2021-04-12 PROCEDURE — 51798 US URINE CAPACITY MEASURE: CPT | Performed by: PHYSICIAN ASSISTANT

## 2021-04-12 ASSESSMENT — MIFFLIN-ST. JEOR: SCORE: 1821.49

## 2021-04-12 NOTE — PATIENT INSTRUCTIONS
Postoperative instructions:    1. Keep your dressings clean, dry and intact,  a. It is important to keep from exposing the incision to the outside environment.    b. The dressings that are applied are sterile. If the dressings do get wet, then you may change the dressings with 4x4 gauze and an ace wrap.  2. Keep the foot elevated above your heart level.  a. When sitting in a chair, rest your foot on a stool or another chair.  b. When lying in bed on on the couch, rest your foot on a couple of pillows.  3. Ice the foot or behind the knee 20 min per hour.  a. This will help reduce the acute inflammation that take place naturally after an injury or surgery.  4. Blood clot prevention  a. It is important to keep the blood moving through the veins to help prevent stagnant blood and clotting.  b. Perform range of motion exercises of the ankle joint (if not splinted), knee and hip joints throughout the day (every 2 hours).    c. Do not remain in bed or the couch all day.  Get up and move around.  d. Report to the nearest ER or Urgent Care if you develop any swelling, redness, pain or shortness of breath to the lower extremity.  i. There you can be evaluated for possible deep vein thrombosis (DVT).  ii. This action can help prevent a life threatening condition known as a pulmonary embolism (PE).    Pain management:    1. Keep the foot elevated and cool to help reduce inflammation.  2. Take your pain medication as prescribed  a. After 3 to 5 days from surgery, try switching to a combination of Tylenol and Ibuprofen (Advil) taken as directed by package instructions.  i. This will help reducing the risk of developing addiction to narcotics.  b. Do not take Tylenol with any narcotic pain medication as they typically come with acetaminophen (Tylenol).  Micah to follow up with Primary Care provider regarding elevated blood pressure.

## 2021-04-12 NOTE — PROGRESS NOTES
"Micah presents to the office s/p one week primary repair with stent rupture of the left lower extremity.  The patient relates keeping the bandages clean, dry and intact.  The patient relates good compliance with postoperative instructions.  The patient denies any severe pain, fevers, chills, nausea or vomiting.  The patient denies having any calf swelling or tenderness.    BP (!) 154/101   Pulse 83   Ht 1.727 m (5' 8\")   Wt 100.7 kg (222 lb)   BMI 33.75 kg/m         Physical Exam:    The patient appears to be in no distress and in good spirits.  The bandages appear clean, dry and intact with no strikethrough noted.   Neurovascular status unchanged with < 3 sec capillary refill to all digits.  No evidence of allodynia.  Noted mild  edema on the right lower extremity.  Sutures are intact and the skin is well coapted with no erythema or drainage noted.  No pain on palpation, erythema or noted calor over the back of the calf.       Assessment:     ICD-10-CM    1. Achilles tendon injury, right, initial encounter  S86.001A    2. Status post right foot surgery  Z98.890        Plan:  Sutures remain intact.  A sterile dressing and posterior splint was reapplied.  The patient was instructed to continue non  weightbearing to the right foot.  The patient is to keep the dressings clean, dry and intact and to continue with elevation of the right foot.  To decrease the risk of developing a blood clot, the patient was instructed to continue with performing leg lifts and knee bends throughout the day to help keep blood moving.  The patient was instructed that if they notice any calf pain, swelling, or shortness of breath, they should to the nearest ER or Urgent Care to be evaluated for a blood clot.  The patient will return to the office in one week for suture removal.      Micah verbalized agreement with and understanding of the rational for the diagnosis and treatment plan.  All questions were answered to best of my " ability and the patient's satisfaction. The patient was advised to contact the clinic with any questions that may arise after the clinic visit.      Disclaimer: This note consists of symbols derived from keyboarding, dictation and/or voice recognition software. As a result, there may be errors in the script that have gone undetected. Please consider this when interpreting information found in this chart.       ANNABELLE Baird D.P.M., [email or web address].

## 2021-04-12 NOTE — ED TRIAGE NOTES
Pt has had problems with urinary frequency but it has been much worse recently. Pt is concerned that he has a UTI.

## 2021-04-12 NOTE — LETTER
"    4/12/2021         RE: Micah Lay  [address] [facility] [address]        Dear Colleague,    Thank you for referring your patient, Micah Lay, to the [facility] [place]. Please see a copy of my visit note below.    Micah presents to the office s/p one week primary repair with stent rupture of the left lower extremity.  The patient relates keeping the bandages clean, dry and intact.  The patient relates good compliance with postoperative instructions.  The patient denies any severe pain, fevers, chills, nausea or vomiting.  The patient denies having any calf swelling or tenderness.    BP (!) 154/101   Pulse 83   Ht 1.727 m (5' 8\")   Wt 100.7 kg (222 lb)   BMI 33.75 kg/m         Physical Exam:    The patient appears to be in no distress and in good spirits.  The bandages appear clean, dry and intact with no strikethrough noted.   Neurovascular status unchanged with < 3 sec capillary refill to all digits.  No evidence of allodynia.  Noted mild  edema on the right lower extremity.  Sutures are intact and the skin is well coapted with no erythema or drainage noted.  No pain on palpation, erythema or noted calor over the back of the calf.       Assessment:     ICD-10-CM    1. Achilles tendon injury, right, initial encounter  S86.001A    2. Status post right foot surgery  Z98.890        Plan:  Sutures remain intact.  A sterile dressing and posterior splint was reapplied.  The patient was instructed to continue non  weightbearing to the right foot.  The patient is to keep the dressings clean, dry and intact and to continue with elevation of the right foot.  To decrease the risk of developing a blood clot, the patient was instructed to continue with performing leg lifts and knee bends throughout the day to help keep blood moving.  The patient was instructed that if they notice any calf pain, swelling, or shortness of breath, they should to the nearest ER or Urgent Care to " be evaluated for a blood clot.  The patient will return to the office in one week for suture removal.      Micah verbalized agreement with and understanding of the rational for the diagnosis and treatment plan.  All questions were answered to best of my ability and the patient's satisfaction. The patient was advised to contact the clinic with any questions that may arise after the clinic visit.      Disclaimer: This note consists of symbols derived from keyboarding, dictation and/or voice recognition software. As a result, there may be errors in the script that have gone undetected. Please consider this when interpreting information found in this chart.       ANNABELLE Baird D.P.M., F.A.C.F.A.S.        Again, thank you for allowing me to participate in the care of your patient.        Sincerely,        Uri Baird DPM

## 2021-04-12 NOTE — NURSING NOTE
"Chief Complaint   Patient presents with     Surgical Followup     DOS 4/06/21, achilles tendon repair, right       Initial BP (!) 154/101   Pulse 83   Ht 1.727 m (5' 8\")   Wt 100.7 kg (222 lb)   BMI 33.75 kg/m   Estimated body mass index is 33.75 kg/m  as calculated from the following:    Height as of this encounter: 1.727 m (5' 8\").    Weight as of this encounter: 100.7 kg (222 lb).  Medications and allergies reviewed.      Cecille TADEO MA    "

## 2021-04-13 LAB
BACTERIA SPEC CULT: NO GROWTH
Lab: NORMAL
SPECIMEN SOURCE: NORMAL

## 2021-04-13 NOTE — RESULT ENCOUNTER NOTE
[facility] Emergency Dept discharge antibiotic (if prescribed): None  No changes in treatment per [facility] ED Lab Result Urine culture protocol.

## 2021-04-16 ASSESSMENT — ENCOUNTER SYMPTOMS
NAUSEA: 0
VOMITING: 0
FREQUENCY: 0
WOUND: 0
DYSURIA: 0
CHILLS: 0
FEVER: 0
ARTHRALGIAS: 1
DIARRHEA: 0
HEMATURIA: 0
SHORTNESS OF BREATH: 0
COUGH: 0
COLOR CHANGE: 0
FLANK PAIN: 0
ABDOMINAL PAIN: 0

## 2021-04-16 NOTE — ED PROVIDER NOTES
History     Chief Complaint   Patient presents with     Urinary Frequency     HPI  Micah Lay is a 55 year old male who presents to the urgent care with concern over nocturia.  Patient reports longstanding symptoms for which she has been on oxybutynin for several years.  He reports that he had repair of his Achilles tendon last week and was discharged home with prescription for hydrocodone which she has been taking as directed.  Since surgery he has noted that he is at increased nocturia waking up 6-7 times per night which is up significantly from typical 1-2.  He denies any actual dysuria.  He has not had any significant urinary frequency, urgency, difficulty starting his flow, slow stream or sensation of incomplete emptying.  He has not had any recent fever, chills, myalgias, cough, chest pains, dyspnea, wheezing, numbness or paresthesias.     Allergies:  No Known Allergies    Problem List:    Patient Active Problem List    Diagnosis Date Noted     Achilles tendon injury, right, initial encounter 04/01/2021     Priority: Medium     Added automatically from request for surgery [id number]       Non-recurrent unilateral inguinal hernia without obstruction or gangrene 10/18/2018     Priority: Medium     Bladder spasms 09/15/2016     Priority: Medium     Hypothyroidism, unspecified type 05/25/2016     Priority: Medium     HYPERLIPIDEMIA LDL GOAL <130 10/31/2010     Priority: Medium     Family history of colon cancer 08/23/2010     Priority: Medium     Family history of aneurysm 08/23/2010     Priority: Medium     Screening for hypertension 11/03/2008     Priority: Medium        Past Medical History:    Past Medical History:   Diagnosis Date     Hypertension      Other and unspecified hyperlipidemia may 2006     Sprain of lumbar region      Unspecified hypothyroidism        Past Surgical History:    Past Surgical History:   Procedure Laterality Date     LAPAROSCOPIC HERNIORRHAPHY INGUINAL BILATERAL Bilateral  6/12/2020    Procedure: Laparoscopic bilateral inguinal hernia repair with mesh;  Surgeon: Jd Wheatley MD;  Location: [facility] OR     REPAIR TENDON ACHILLES Right 4/6/2021    Procedure: Achilles tendon repair, right;  Surgeon: Uri Baird DPM;  Location: [facility] OR       Family History:    Family History   Problem Relation Age of Onset     Arthritis Mother      Thyroid Disease Mother      Lipids Mother      Cerebrovascular Disease Mother      Hypertension Father      Heart Disease Father      Allergies Son      Neurologic Disorder Son         taking concerta     Connective Tissue Disorder Maternal Grandfather      Hypertension Brother      Obesity Brother      Lipids Brother      Cardiovascular Brother         ansyerusm       Social History:  Marital Status:   [2]  Social History     Tobacco Use     Smoking status: Never Smoker     Smokeless tobacco: Never Used   Substance Use Topics     Alcohol use: Yes     Comment: rare     Drug use: No        Medications:    calcium-magnesium (CALMAG) 500-250 MG TABS per tablet  HYDROcodone-acetaminophen (NORCO) 5-325 MG tablet  hydrOXYzine (VISTARIL) 25 MG capsule  levothyroxine (SYNTHROID/LEVOTHROID) 137 MCG tablet  levothyroxine (SYNTHROID/LEVOTHROID) 150 MCG tablet  Melatonin (MELATONIN TR) 10 MG TBCR  naloxone (NARCAN) 4 MG/0.1ML nasal spray  oxybutynin ER (DITROPAN-XL) 10 MG 24 hr tablet  oxyCODONE (ROXICODONE) 5 MG tablet  pravastatin (PRAVACHOL) 40 MG tablet  rivaroxaban ANTICOAGULANT (XARELTO ANTICOAGULANT) 10 MG TABS tablet  senna-docusate (SENOKOT-S/PERICOLACE) 8.6-50 MG tablet  Vitamin D3 (CHOLECALCIFEROL) 25 mcg (1000 units) tablet  vitamin E (TOCOPHEROL) 100 units (90 mg) capsule      Review of Systems   Constitutional: Negative for chills and fever.   Respiratory: Negative for cough and shortness of breath.    Cardiovascular: Negative for chest pain.   Gastrointestinal: Negative for abdominal pain, diarrhea, nausea and vomiting.   Genitourinary: Negative  for dysuria, flank pain, frequency, hematuria and urgency.        Nocturia   Musculoskeletal: Positive for arthralgias (expected post-surgical pain).   Skin: Negative for color change, rash and wound.     Physical Exam   BP: (!) 156/101  Pulse: 95  Temp: 97.4  F (36.3  C)  Resp: 20  Weight: 100.7 kg (222 lb 0.1 oz)  SpO2: 97 %  Physical Exam  GENERAL APPEARANCE: healthy, alert and no distress  RESP: lungs clear to auscultation - no rales, rhonchi or wheezes  CV: regular rates and rhythm, normal S1 S2, no murmur noted  ABDOMEN:  soft, nontender, no HSM or masses and bowel sounds normal  BACK: No CVA tenderness  MS:    SKIN: no suspicious lesions or rashes  ED Course        Procedures               Critical Care time:  none               Results for orders placed or performed during the hospital encounter of 04/12/21   UA with Microscopic     Status: Abnormal   Result Value Ref Range    Color Urine Christie     Appearance Urine Slightly Cloudy     Glucose Urine Negative NEG^Negative mg/dL    Bilirubin Urine Negative NEG^Negative    Ketones Urine 5 (A) NEG^Negative mg/dL    Specific Gravity Urine 1.026 1.003 - 1.035    Blood Urine Negative NEG^Negative    pH Urine 5.0 5.0 - 7.0 pH    Protein Albumin Urine Negative NEG^Negative mg/dL    Urobilinogen mg/dL 0.0 0.0 - 2.0 mg/dL    Nitrite Urine Negative NEG^Negative    Leukocyte Esterase Urine Negative NEG^Negative    Source Midstream Urine     WBC Urine <1 0 - 5 /HPF    RBC Urine 1 0 - 2 /HPF    Squamous Epithelial /HPF Urine <1 0 - 1 /HPF    Mucous Urine Present (A) NEG^Negative /LPF   Urine Culture     Status: None    Specimen: Midstream Urine   Result Value Ref Range    Specimen Description Midstream Urine     Special Requests Specimen received in preservative     Culture Micro No growth      Medications - No data to display    Assessments & Plan (with Medical Decision Making)     I have reviewed the nursing notes.    I have reviewed the findings, diagnosis, plan and need  for follow up with the patient.       Discharge Medication List as of 4/12/2021  6:37 PM        Final diagnoses:   Nocturia     55-year-old male  with known history of bladder spasms, currently on oxybutynin.  Presents to the emergency department with concern over increased nocturia since having Achilles tendon repair surgery last week.  He had elevated blood pressure upon arrival, remainder of vital signs stable.  Physical exam findings as described above are benign.  He had urinalysis which was negative for evidence of infection.  No blood or concerning pain history to suggest nephrolithiasis.  He did not have any significant evidence of retention on bladder scanner obtained by LPN.  He was discharged home stable with urine culture pending and instructions to follow-up with urology as needed if symptoms persist.  Worrisome reasons to return to ER/UC discussed.     Disclaimer: This note consists of symbols derived from keyboarding, dictation, and/or voice recognition software. As a result, there may be errors in the script that have gone undetected.  Please consider this when interpreting information found in the chart.    4/12/2021   [facility] EMERGENCY DEPT     Christie Arceo PA-C  04/16/21 [id number]

## 2021-04-19 ENCOUNTER — OFFICE VISIT (OUTPATIENT)
Dept: PODIATRY | Facility: CLINIC | Age: 56
End: 2021-04-19
Payer: COMMERCIAL

## 2021-04-19 VITALS
BODY MASS INDEX: 33.65 KG/M2 | DIASTOLIC BLOOD PRESSURE: 100 MMHG | SYSTOLIC BLOOD PRESSURE: 146 MMHG | HEART RATE: 79 BPM | HEIGHT: 68 IN | WEIGHT: 222 LBS

## 2021-04-19 DIAGNOSIS — Z98.890 STATUS POST RIGHT FOOT SURGERY: ICD-10-CM

## 2021-04-19 DIAGNOSIS — S86.001D ACHILLES TENDON INJURY, RIGHT, SUBSEQUENT ENCOUNTER: Primary | ICD-10-CM

## 2021-04-19 DIAGNOSIS — I89.0 LYMPHEDEMA OF RIGHT LOWER EXTREMITY: ICD-10-CM

## 2021-04-19 PROCEDURE — 99024 POSTOP FOLLOW-UP VISIT: CPT | Performed by: PODIATRIST

## 2021-04-19 ASSESSMENT — MIFFLIN-ST. JEOR: SCORE: 1821.49

## 2021-04-19 NOTE — NURSING NOTE
"Chief Complaint   Patient presents with     Suture Removal     DOS 4/06/21, achilles tendon repair, right       Initial BP (!) 146/100   Pulse 79   Ht 1.727 m (5' 8\")   Wt 100.7 kg (222 lb)   BMI 33.75 kg/m   Estimated body mass index is 33.75 kg/m  as calculated from the following:    Height as of this encounter: 1.727 m (5' 8\").    Weight as of this encounter: 100.7 kg (222 lb).  Medications and allergies reviewed.      Cecille TADEO MA    "

## 2021-04-19 NOTE — PATIENT INSTRUCTIONS
Next Steps    1. Now that your sutures have been removed, you may get the foot wet.    a. Start out with showering and lightly cleansing the skin.    b. After three days, you may begin soaking your foot in warm water with Epsom's salt for around 20 min.    c. While you are soaking, wiggle the toes and move the ankle around.    i. The combination of warmth and tissue movement will work together in breaking up scar tissue that is under the skin.    d. Remove the foot and ankle from the water and lightly dry off the skin.    e. Next, apply either a foot cream or muscle rub and perform a deep tissue massage around the surgical area.    i. This will also aid in breaking down scar tissue as well as hydrating the skin and reducing inflammation tissue.  1. Mederma Advanced Scar Gel  2. ScarAway Silicone Scar Sheets  f. If you had steristrips applied, they should lift off the skin over the next couple of weeks.  2. It is likely that you will still need to remain non weight bearing as the bones are still healing.  a. The doctor may say that it is ok to start protected weightbearing in a postoperative shoe.    b. Always increase your activity gradually over time  c. Listen to your foot or ankle.    i. If more pain and swelling is noted, you may need to get off the foot to allow more time to heal.  3. Return in 4 weeks for reevaluation and repeat x-rays, if indicated.      Micah to follow up with Primary Care provider regarding elevated blood pressure.

## 2021-04-19 NOTE — PROGRESS NOTES
"Micah presents to the office s/p 2 weeks primary repair of an Achilles tendon rupture of the right lower extremity.  The patient relates keeping the bandages clean, dry and intact.  The patient relates good compliance with postoperative instructions.  The patient denies any severe pain, fevers, chills, nausea or vomiting.  The patient denies having any calf swelling or tenderness.    BP (!) 146/100   Pulse 79   Ht 1.727 m (5' 8\")   Wt 100.7 kg (222 lb)   BMI 33.75 kg/m         Physical Exam:    The patient appears to be in no distress and in good spirits.  The bandages appear clean, dry and intact with no strikethrough noted.   Neurovascular status unchanged with < 3 sec capillary refill to all digits.  No evidence of allodynia.  Noted mild  edema on the right lower extremity.  Sutures are intact and the skin is well coapted with no erythema or drainage noted.  No pain on palpation, erythema or noted calor over the back of the calf.       Assessment:     ICD-10-CM    1. Achilles tendon injury, right, subsequent encounter  S86.001D    2. Status post right foot surgery  Z98.890        Plan:  Sutures were removed and Steri-Strips applied.  A compression dressing was reapplied.  The patient was prescribed compression stockings for swelling.  The patient was instructed to continue non  weightbearing to the right foot.  The patient was instructed on performing warm Epson salt water soaks and range of motion exercises of the right ankle.  The patient will return to the office in one month for reevaluation.      Micah verbalized agreement with and understanding of the rational for the diagnosis and treatment plan.  All questions were answered to best of my ability and the patient's satisfaction. The patient was advised to contact the clinic with any questions that may arise after the clinic visit.      Disclaimer: This note consists of symbols derived from keyboarding, dictation and/or voice recognition software. As " a result, there may be errors in the script that have gone undetected. Please consider this when interpreting information found in this chart.       ANNABELLE Baird D.P.M., [email or web address].

## 2021-04-19 NOTE — LETTER
"    4/19/2021         RE: Micah Lay  [address] [facility] [address]        Dear Colleague,    Thank you for referring your patient, Micah Lay, to the [facility] [place]. Please see a copy of my visit note below.    Micah presents to the office s/p 2 weeks primary repair of an Achilles tendon rupture of the right lower extremity.  The patient relates keeping the bandages clean, dry and intact.  The patient relates good compliance with postoperative instructions.  The patient denies any severe pain, fevers, chills, nausea or vomiting.  The patient denies having any calf swelling or tenderness.    BP (!) 146/100   Pulse 79   Ht 1.727 m (5' 8\")   Wt 100.7 kg (222 lb)   BMI 33.75 kg/m         Physical Exam:    The patient appears to be in no distress and in good spirits.  The bandages appear clean, dry and intact with no strikethrough noted.   Neurovascular status unchanged with < 3 sec capillary refill to all digits.  No evidence of allodynia.  Noted mild  edema on the right lower extremity.  Sutures are intact and the skin is well coapted with no erythema or drainage noted.  No pain on palpation, erythema or noted calor over the back of the calf.       Assessment:     ICD-10-CM    1. Achilles tendon injury, right, subsequent encounter  S86.001D    2. Status post right foot surgery  Z98.890        Plan:  Sutures were removed and Steri-Strips applied.  A compression dressing was reapplied.  The patient was prescribed compression stockings for swelling.  The patient was instructed to continue non  weightbearing to the right foot.  The patient was instructed on performing warm Epson salt water soaks and range of motion exercises of the right ankle.  The patient will return to the office in one month for reevaluation.      Micah verbalized agreement with and understanding of the rational for the diagnosis and treatment plan.  All questions were answered to best of my " ability and the patient's satisfaction. The patient was advised to contact the clinic with any questions that may arise after the clinic visit.      Disclaimer: This note consists of symbols derived from keyboarding, dictation and/or voice recognition software. As a result, there may be errors in the script that have gone undetected. Please consider this when interpreting information found in this chart.       ANNABELLE Baird D.P.M., F.A.C.F.A.S.        Again, thank you for allowing me to participate in the care of your patient.        Sincerely,        Uri Baird DPM

## 2021-05-07 DIAGNOSIS — E03.9 HYPOTHYROIDISM, UNSPECIFIED TYPE: ICD-10-CM

## 2021-05-07 NOTE — LETTER
[facility]  [address] [address]  Phone: [phone]    May 11, 2021      Micah Lay  [address] [address] [facility] [address]            Dear Micah Lay:    This is to remind you that your provider wanted you to return to the clinic for lab test: TSH (thyroid).      This was to be rechecked TSH 6 weeks after starting the levothyroxine 287mcg daily dose.    You may call our office at [facility] at [phone] to schedule an appointment.    Please disregard this notice if you have already had your labs drawn or made an appointment.        Sincerely,        Dr. Juventino Jin / Radha BECKFORD, RN, BSN

## 2021-05-10 RX ORDER — LEVOTHYROXINE SODIUM 150 UG/1
TABLET ORAL
Qty: 90 TABLET | Refills: 1 | Status: SHIPPED | OUTPATIENT
Start: 2021-05-10 | End: 2021-12-21

## 2021-05-18 ENCOUNTER — TELEPHONE (OUTPATIENT)
Dept: PODIATRY | Facility: CLINIC | Age: 56
End: 2021-05-18

## 2021-05-18 NOTE — TELEPHONE ENCOUNTER
Spoke to patient discussed ok to remove steri-strips, if they are peeling/falling off at this point.  He will follow up on 5/19/21 as scheduled.    Josh De Jesus ATC

## 2021-05-18 NOTE — TELEPHONE ENCOUNTER
Reason for Call:  Other     Detailed comments: Pt has tape on his incisions that are starting to peel (6 weeks postop today) - can he take these off before his appt tomorrow? - Please advise     Phone Number Patient can be reached at: [phone]    Best Time: Any    Can we leave a detailed message on this number? YES    Call taken on 5/18/2021 at 3:17 PM by Denise Behrendt

## 2021-05-19 ENCOUNTER — OFFICE VISIT (OUTPATIENT)
Dept: PODIATRY | Facility: CLINIC | Age: 56
End: 2021-05-19
Payer: COMMERCIAL

## 2021-05-19 VITALS
BODY MASS INDEX: 33.65 KG/M2 | WEIGHT: 222 LBS | HEIGHT: 68 IN | SYSTOLIC BLOOD PRESSURE: 143 MMHG | DIASTOLIC BLOOD PRESSURE: 101 MMHG | HEART RATE: 74 BPM

## 2021-05-19 DIAGNOSIS — S86.001D ACHILLES TENDON INJURY, RIGHT, SUBSEQUENT ENCOUNTER: Primary | ICD-10-CM

## 2021-05-19 DIAGNOSIS — Z98.890 STATUS POST RIGHT FOOT SURGERY: ICD-10-CM

## 2021-05-19 PROCEDURE — 99024 POSTOP FOLLOW-UP VISIT: CPT | Performed by: PODIATRIST

## 2021-05-19 ASSESSMENT — MIFFLIN-ST. JEOR: SCORE: 1821.49

## 2021-05-19 NOTE — PATIENT INSTRUCTIONS
Micah to follow up with Primary Care provider regarding elevated blood pressure.    Ankle rehabilitation:  2 weeks range of motion (non-weightbearing), 2 weeks calf strengthening (protected weightbearing in a CAM boot, 2 weeks ankle balancing (Full weightbearing)

## 2021-05-19 NOTE — NURSING NOTE
"Chief Complaint   Patient presents with     Surgical Followup     \"Swelling in toes at night\" DOS 4/06/21, achilles tendon repair, right       Initial BP (!) 143/101   Pulse 74   Ht 1.727 m (5' 8\")   Wt 100.7 kg (222 lb)   BMI 33.75 kg/m   Estimated body mass index is 33.75 kg/m  as calculated from the following:    Height as of this encounter: 1.727 m (5' 8\").    Weight as of this encounter: 100.7 kg (222 lb).  Medications and allergies reviewed.      Cecille TADEO MA    "

## 2021-05-19 NOTE — PROGRESS NOTES
"Micah returns to the office with his wife for reevaluation of the right foot.  The patient relates following the instructions given at the last visit with noted overall less pain and more improvement in function of the right foot.   The patient relates no other problems.    Vitals: BP (!) 143/101   Pulse 74   Ht 1.727 m (5' 8\")   Wt 100.7 kg (222 lb)   BMI 33.75 kg/m    BMI= Body mass index is 33.75 kg/m .    Lower Extremity Physical Exam:      Neurovascular status remains unchanged.  Muscular exam is within normal limits to major muscle groups.  Integument is intact.      Noted limited ankle range of motion on the right.  The incision is well coapted with no surrounding erythema or drainage noted.         Assessment:     ICD-10-CM    1. Achilles tendon injury, right, subsequent encounter  S86.001D PHYSICAL THERAPY REFERRAL   2. Status post right foot surgery  Z98.890 PHYSICAL THERAPY REFERRAL       Plan:    I have explained to Micah about the progress of the conditions.  At this time, the patient was referred to physical therapy for right ankle rehabilitation.  The patient will start off with protected weightbearing in a cam boot and advance activity under the guidance of physical therapy.  The patient will return in 1 month for reevaluation.    Micah verbalized agreement with and understanding of the rational for the diagnosis and treatment plan.  All questions were answered to best of my ability and the patient's satisfaction. The patient was advised to contact the clinic with any questions that may arise after the clinic visit.      Disclaimer: This note consists of symbols derived from keyboarding, dictation and/or voice recognition software. As a result, there may be errors in the script that have gone undetected. Please consider this when interpreting information found in this chart.       ANNABELLE Baird D.P.M., F.ANATOLY.SURJIT.[email or web address].S.    "

## 2021-05-19 NOTE — LETTER
May 19, 2021      Micah Lay  [address] [facility] [address]        To Whom It May Concern:    Micah Lay was seen in our clinic. He advance to physical therapy rehabilitation for approximately 4-6 weeks. The patient will return in one month for reevaluation.  An updated work status will then be made.  I anticipate that he will be able to return to work at that point.      Sincerely,        Uri Baird DPM

## 2021-05-19 NOTE — LETTER
"    5/19/2021         RE: Micah Lay  [address] [facility] [address]        Dear Colleague,    Thank you for referring your patient, Micah Lay, to the [facility] [place]. Please see a copy of my visit note below.    Micah returns to the office with his wife for reevaluation of the right foot.  The patient relates following the instructions given at the last visit with noted overall less pain and more improvement in function of the right foot.   The patient relates no other problems.    Vitals: BP (!) 143/101   Pulse 74   Ht 1.727 m (5' 8\")   Wt 100.7 kg (222 lb)   BMI 33.75 kg/m    BMI= Body mass index is 33.75 kg/m .    Lower Extremity Physical Exam:      Neurovascular status remains unchanged.  Muscular exam is within normal limits to major muscle groups.  Integument is intact.      Noted limited ankle range of motion on the right.  The incision is well coapted with no surrounding erythema or drainage noted.         Assessment:     ICD-10-CM    1. Achilles tendon injury, right, subsequent encounter  S86.001D PHYSICAL THERAPY REFERRAL   2. Status post right foot surgery  Z98.890 PHYSICAL THERAPY REFERRAL       Plan:    I have explained to Micah about the progress of the conditions.  At this time, the patient was referred to physical therapy for right ankle rehabilitation.  The patient will start off with protected weightbearing in a cam boot and advance activity under the guidance of physical therapy.  The patient will return in 1 month for reevaluation.    Micah verbalized agreement with and understanding of the rational for the diagnosis and treatment plan.  All questions were answered to best of my ability and the patient's satisfaction. The patient was advised to contact the clinic with any questions that may arise after the clinic visit.      Disclaimer: This note consists of symbols derived from keyboarding, dictation and/or voice recognition software. " As a result, there may be errors in the script that have gone undetected. Please consider this when interpreting information found in this chart.       ANNABELLE Baird D.P.M., F.A.C.F.A.S.        Again, thank you for allowing me to participate in the care of your patient.        Sincerely,        Uri Baird DPM

## 2021-05-20 ENCOUNTER — HOSPITAL ENCOUNTER (OUTPATIENT)
Dept: PHYSICAL THERAPY | Facility: CLINIC | Age: 56
Setting detail: THERAPIES SERIES
End: 2021-05-20
Attending: PODIATRIST
Payer: COMMERCIAL

## 2021-05-20 DIAGNOSIS — Z98.890 STATUS POST RIGHT FOOT SURGERY: ICD-10-CM

## 2021-05-20 DIAGNOSIS — S86.001D ACHILLES TENDON INJURY, RIGHT, SUBSEQUENT ENCOUNTER: ICD-10-CM

## 2021-05-20 PROCEDURE — 97161 PT EVAL LOW COMPLEX 20 MIN: CPT | Mod: GP | Performed by: PHYSICAL THERAPIST

## 2021-05-20 PROCEDURE — 97140 MANUAL THERAPY 1/> REGIONS: CPT | Mod: GP | Performed by: PHYSICAL THERAPIST

## 2021-05-20 PROCEDURE — 97110 THERAPEUTIC EXERCISES: CPT | Mod: GP | Performed by: PHYSICAL THERAPIST

## 2021-05-20 NOTE — PROGRESS NOTES
Micah Lay  1965 Physical Therapy Initial Evaluation  05/20/21 1500   General Information   Type of Visit Initial OP Ortho PT Evaluation   Start of Care Date 05/20/21   Referring Physician Uri Baird DPM   Patient/Family Goals Statement Walk without crutches   Orders Evaluate and Treat   Orders Comment Ankle rehabilitation:  2 weeks range of motion (non-weightbearing), 2 weeks calf strengthening (protected weightbearing in a CAM boot, 2 weeks ankle balancing (Full weightbearing)   Date of Order 05/19/21   Certification Required? No   Medical Diagnosis Achilles tendon injury, right / Status post right foot surgery   Surgical/Medical history reviewed Yes   Body Part(s)   Body Part(s) Ankle/Foot   Presentation and Etiology   Pertinent history of current problem (include personal factors and/or comorbidities that impact the POC) Surgery on April 6. No pain right now. Has some pain when puts weight through leg occasionally. Now that can put weight on foot stairs have been much easier. Has been elevating leg as much as he can. Had some swelling but stocking has really helped with that. / Comorbidities - Hypothyroidism    Impairments C. Swelling;F. Decreased strength and endurance;G. Impaired balance;H. Impaired gait   Functional Limitations perform activities of daily living;perform desired leisure / sports activities;perform required work activities   Symptom Location Right foot   How/Where did it occur With a fall;At home   Onset date of current episode/exacerbation 03/26/21   Chronicity New   Pain rating (0-10 point scale) Worst (/10)   Worst (/10) 3   Pain quality C. Aching   Frequency of pain/symptoms C. With activity   Pain/symptoms exacerbated by B. Walking   Pain/symptoms eased by A. Sitting;C. Rest;J. Braces/supports   Progression of symptoms since onset: Improved   Prior Level of Function   Functional Level Prior Comment Independent in ADLs   Current Level of Function   Patient role/employment  history A. Employed   Employment Comments [employer] - On feet quite a bit - ladders   [place] [facility] [place]   Home/community accessibility 3 steps to get into house with rail / 1 step into kitchen   Current equipment-Gait/Locomotion Axillary crutches;Other  (Knee scooter)   Current equipment-ADL Shower/tub chair   Fall Risk Screen   Fall screen completed by PT   Have you fallen 2 or more times in the past year? No   Have you fallen and had an injury in the past year? Yes   Is patient a fall risk? Yes   Abuse Screen (yes response referral indicated)   Feels Unsafe at Home or Work/School   (Deferred due to family member present)   Feels Threatened by Someone   (Deferred due to family member present)   Does Anyone Try to Keep You From Having Contact with Others or Doing Things Outside Your Home?   (Deferred due to family member present)   Physical Signs of Abuse Present no   Ankle/Foot Objective Findings   Side (if bilateral, select both right and left) Right   Integumentary Moderate swelling of foot, ankle, and lower leg   Gait/Locomotion Ambulates with axillary crutches, CAM boot, WBAT / Stairs - Ambulates with axillary crutches and step-to gait pattern   Right DF (Knee Ext) AROM 10 degrees short of 0 / Left - 0   Right PF AROM 35 / Left - 48   Right Calcanceal Inversion AROM 0 / Left - 15   Right Calcaneal Eversion AROM 0 / Left - 13   Right DF/Inversion Strength <3/5 due to ROM deficits   Right DF/Eversion Strength <3/5 due to ROM deficits   Right PF/Inversion Strength <3/5 due to ROM deficits   Right PF/Eversion Strength <3/5 due to ROM deficits   Right PF Strength <3/5 due to ROM deficits   Planned Therapy Interventions   Planned Therapy Interventions manual therapy;neuromuscular re-education;ROM;strengthening;stretching;balance training;gait training   Planned Modality Interventions   Planned Modality Interventions Cryotherapy;Hot packs;TENS;Ultrasound;Electrical stimulation   Clinical  Impression   Criteria for Skilled Therapeutic Interventions Met yes, treatment indicated   PT Diagnosis Achilles tendon repair and difficulty with ambulation and stair climbing   Influenced by the following impairments Pain, Strength and flexibility deficits   Functional limitations due to impairments Difficulty with ambulation, stair climbing, Work tasks   Clinical Presentation Stable/Uncomplicated   Clinical Presentation Rationale 1 comorbidity impacting PT / 1 body system   Clinical Decision Making (Complexity) Low complexity   Therapy Frequency   (1-2 times per week)   Predicted Duration of Therapy Intervention (days/wks) 12 weeks   Risk & Benefits of therapy have been explained Yes   Patient, Family & other staff in agreement with plan of care Yes   Education Assessment   Preferred Learning Style Listening;Reading;Demonstration;Pictures/video   Barriers to Learning No barriers   ORTHO GOALS   PT Ortho Eval Goals 1;2;3;4   Ortho Goal 1   Goal Identifier HEP   Goal Description Pt will be independent in HEP in order to achieve and maintain long term treatment goals.   Target Date 06/20/21   Ortho Goal 2   Goal Identifier Ambulation   Goal Description Pt will be able to ambulate for community distances without an assistive device with a minimal increase in pain 1-2/10.   Target Date 08/12/21   Ortho Goal 3   Goal Identifier Stairs   Goal Description Pt will be able to ascend and descend 1 flight of stairs with a reciprical gait pattern and handrail assist safely.   Target Date 07/15/21   Ortho Goal 4   Goal Identifier Work   Goal Description Pt will be able to return to work and perform all work duties with a minimal increase in pain 1-2/10.   Target Date 08/12/21   Total Evaluation Time   PT Gurpreet Low Complexity Minutes (575[address]) 20     Kurt Davis, PT, DPT

## 2021-06-02 ENCOUNTER — HOSPITAL ENCOUNTER (OUTPATIENT)
Dept: PHYSICAL THERAPY | Facility: CLINIC | Age: 56
Setting detail: THERAPIES SERIES
End: 2021-06-02
Attending: PODIATRIST
Payer: COMMERCIAL

## 2021-06-02 PROCEDURE — 97110 THERAPEUTIC EXERCISES: CPT | Mod: GP | Performed by: PHYSICAL THERAPIST

## 2021-06-04 ENCOUNTER — HOSPITAL ENCOUNTER (OUTPATIENT)
Dept: PHYSICAL THERAPY | Facility: CLINIC | Age: 56
Setting detail: THERAPIES SERIES
End: 2021-06-04
Attending: PODIATRIST
Payer: COMMERCIAL

## 2021-06-04 PROCEDURE — 97110 THERAPEUTIC EXERCISES: CPT | Mod: GP | Performed by: PHYSICAL THERAPIST

## 2021-06-05 DIAGNOSIS — N32.89 BLADDER SPASMS: ICD-10-CM

## 2021-06-07 RX ORDER — OXYBUTYNIN CHLORIDE 10 MG/1
TABLET, EXTENDED RELEASE ORAL
Qty: 30 TABLET | Refills: 0 | Status: SHIPPED | OUTPATIENT
Start: 2021-06-07 | End: 2021-06-24

## 2021-06-07 NOTE — TELEPHONE ENCOUNTER
"Requested Prescriptions   Pending Prescriptions Disp Refills     oxybutynin ER (DITROPAN-XL) 10 MG 24 hr tablet [Pharmacy Med Name: OXYBUTYNIN CL ER 10 MG TABLET] 90 tablet 0     Sig: TAKE 1 TABLET BY MOUTH EVERY DAY       Muscarinic Antagonists (Urinary Incontinence Agents) Passed - 6/5/2021  9:28 AM        Passed - Recent (12 mo) or future (30 days) visit within the authorizing provider's specialty     Patient has had an office visit with the authorizing provider or a provider within the authorizing providers department within the previous 12 mos or has a future within next 30 days. See \"Patient Info\" tab in inbasket, or \"Choose Columns\" in Meds & Orders section of the refill encounter.              Passed - Medication is Oxybutynin and patient is 5 years of age or older        Passed - Patient does not have a diagnosis of glaucoma on the problem list     If glaucoma diagnosis is new, refer refill to physician.          Passed - Medication is active on med list        Passed - Patient is 18 years of age or older                   "

## 2021-06-08 ENCOUNTER — HOSPITAL ENCOUNTER (OUTPATIENT)
Dept: PHYSICAL THERAPY | Facility: CLINIC | Age: 56
Setting detail: THERAPIES SERIES
End: 2021-06-08
Attending: PODIATRIST
Payer: COMMERCIAL

## 2021-06-08 PROCEDURE — 97110 THERAPEUTIC EXERCISES: CPT | Mod: GP

## 2021-06-10 ENCOUNTER — HOSPITAL ENCOUNTER (OUTPATIENT)
Dept: PHYSICAL THERAPY | Facility: CLINIC | Age: 56
Setting detail: THERAPIES SERIES
End: 2021-06-10
Attending: PODIATRIST
Payer: COMMERCIAL

## 2021-06-10 PROCEDURE — 97110 THERAPEUTIC EXERCISES: CPT | Mod: GP

## 2021-06-15 ENCOUNTER — HOSPITAL ENCOUNTER (OUTPATIENT)
Dept: PHYSICAL THERAPY | Facility: CLINIC | Age: 56
Setting detail: THERAPIES SERIES
End: 2021-06-15
Attending: PODIATRIST
Payer: COMMERCIAL

## 2021-06-15 PROCEDURE — 97110 THERAPEUTIC EXERCISES: CPT | Mod: GP

## 2021-06-15 PROCEDURE — 97112 NEUROMUSCULAR REEDUCATION: CPT | Mod: GP

## 2021-06-17 ENCOUNTER — OFFICE VISIT (OUTPATIENT)
Dept: PODIATRY | Facility: CLINIC | Age: 56
End: 2021-06-17
Payer: COMMERCIAL

## 2021-06-17 VITALS
HEIGHT: 68 IN | HEART RATE: 76 BPM | WEIGHT: 222 LBS | DIASTOLIC BLOOD PRESSURE: 99 MMHG | SYSTOLIC BLOOD PRESSURE: 157 MMHG | BODY MASS INDEX: 33.65 KG/M2

## 2021-06-17 DIAGNOSIS — S86.001D ACHILLES TENDON INJURY, RIGHT, SUBSEQUENT ENCOUNTER: Primary | ICD-10-CM

## 2021-06-17 PROCEDURE — 99024 POSTOP FOLLOW-UP VISIT: CPT | Performed by: PODIATRIST

## 2021-06-17 ASSESSMENT — MIFFLIN-ST. JEOR: SCORE: 1821.49

## 2021-06-17 NOTE — NURSING NOTE
"Chief Complaint   Patient presents with     RECHECK     \"not walking with the boot as much\", DOS 4/06/21, achilles tendon repair, right       Initial BP (!) 157/99   Pulse 76   Ht 1.727 m (5' 8\")   Wt 100.7 kg (222 lb)   BMI 33.75 kg/m   Estimated body mass index is 33.75 kg/m  as calculated from the following:    Height as of this encounter: 1.727 m (5' 8\").    Weight as of this encounter: 100.7 kg (222 lb).  Medications and allergies reviewed.      Cecille TADEO MA    "

## 2021-06-17 NOTE — PROGRESS NOTES
"Micah returns to the office for reevaluation of the right Achilles tendon.  The patient relates following the instructions given at the last visit with noted overall less pain and more improvement in function of the right ankle.   The patient relates no other problems.    Vitals: BP (!) 157/99   Pulse 76   Ht 1.727 m (5' 8\")   Wt 100.7 kg (222 lb)   BMI 33.75 kg/m    BMI= Body mass index is 33.75 kg/m .    Lower Extremity Physical Exam:      Neurovascular status remains unchanged.  Muscular exam is within normal limits to major muscle groups.  Integument is intact.      Noted decreased pain on palpation over the Achilles tendon on the right.  No surrounding erythema noted.  Noted improved ankle range of motion on the right.       Assessment:     ICD-10-CM    1. Achilles tendon injury, right, subsequent encounter  S86.001D        Plan:    I have explained to Micah about the progress of the conditions.  At this time, the patient was educated on the importance of offloading supportive shoes and other devices.  I demonstrated to the patient calf stretches to perform every hour daily until symptoms resolve.  After symptoms resolve, the patient was advised to perform the stretches 3 times daily to prevent future recurrence.  The patient was instructed to perform warm soaks with Epson salt after which to also apply over-the-counter Voltaren gel to deeply massage the injured tissue.  The patient was instructed to do this on a daily basis until symptoms resolve.  The patient may also take over-the-counter NSAID medication, if tolerated, to help further reduce the inflammation tissue.   The patient was advised to take this type of medication with food to prevent stomach irritation and to stop taking the medication if stomach irritation occurs.  The patient was fitted with a Tri-Lock ankle brace that will aid in offloading the tension forces to the soft tissues and prevent further inflammation.   The patient will " return in four weeks for reevaluation if the symptoms do not resolve.      Micah verbalized agreement with and understanding of the rational for the diagnosis and treatment plan.  All questions were answered to best of my ability and the patient's satisfaction. The patient was advised to contact the clinic with any questions that may arise after the clinic visit.      Disclaimer: This note consists of symbols derived from keyboarding, dictation and/or voice recognition software. As a result, there may be errors in the script that have gone undetected. Please consider this when interpreting information found in this chart.       ANNABELLE Baird D.P.M., F.ANATOLY.C.F.A.S.

## 2021-06-17 NOTE — LETTER
June 17, 2021      Micah Lay  [address] [facility] [address]        To Whom It May Concern:    Micah Lay was seen in our clinic. He may return to work with no restrictions on Monday, June 28, 2021.    Sincerely,        Uri Baird DPM

## 2021-06-17 NOTE — PATIENT INSTRUCTIONS
Micah to follow up with Primary Care provider regarding elevated blood pressure.    Next Steps:      1. Support:  a. Wear supportive shoes, sandals, boots and/or inserts that have a rigid supportive sole.    i. This will offload the majority of tension forces that travel through your feet every step you take.    1. Skechers Max Cushioning Elite/Premier   2. Skechers Relax Fit D'Lux Walker  3. Superfeet inserts ([email or web address].[email or web address])  b. It is important that you also wear supportive shoe wear in the house to continue providing support to your feet.    c. You may always use a cushioned liner for your shoes if that makes your feet feel better.  2. Stretching  a. Calf stretching is essential to offload the tension forces that travel through your feet every step you take  b. Preferred calf stretch is the Runner's Stretch  i. Place one foot behind the other foot, flat against the ground (it is important to keep the heel on the ground).  The back leg is the one that will be stretched.  1. Start with the knee straight and lean your hips into the wall, counter or whatever you are leaning into - count to ten.  2. Next, bend the knee.  You should feel the stretch lower in the calf muscle - count to ten.  c. Repeat this stretch once an hour to start off with.  When symptoms subside, I recommend performing the stretch 3 times daily to prevent any future problems.                3. Tissue Massage  a. It is important that you physically loosen the inflammation tissue to help your body heal the injured tissue.  b. I recommend soaking your foot in warm water to increase the microcirculation to the soft tissues.  You may add Epson salt to the water if you prefer.  c. You may apply an over-the-counter muscle rub, such as Voltaren gel, and deeply massage the injured tissue.  4. Reduce Inflammation  a. You can ice the injured tissue with an ice pack with a light cloth covering or soaking in ice water 20 minutes to reduce any acute  inflammation, typically at the end of the day.  b. If tolerated, you may take a Non-Steroidal Antiinflammatory medication (NSAID), such as Advil or Aleve, to help reduce the inflammation tissue.  This can help the overall healing of the injured tissue.  i. It is important to take food with any NSAID medication as the most common side effect is stomach irritation.  If you encounter any problems when taking NSAID, it is recommended that you stop taking the medication and notify your provider.    It is important to understand that most problems that develop in the foot and ankle are caused by excessive tension that cause microinjury to the soft tissues and inflammation in the foot and ankle.  By addressing the underlying causes with support and stretching as well as treating the current inflammatory conditions with tissue massage and anti-inflammatory treatments, most foot and ankle musculoskeletal conditions will resolve.  This may take time to heal.  However, if symptoms persist past 4 weeks you should return to the office for reevaluation to determine further treatment options.

## 2021-06-17 NOTE — LETTER
"    6/17/2021         RE: Micah Lay  [address] [facility] [address]        Dear Colleague,    Thank you for referring your patient, Micah Lay, to the [facility] [place]. Please see a copy of my visit note below.    Micah returns to the office for reevaluation of the right Achilles tendon.  The patient relates following the instructions given at the last visit with noted overall less pain and more improvement in function of the right ankle.   The patient relates no other problems.    Vitals: BP (!) 157/99   Pulse 76   Ht 1.727 m (5' 8\")   Wt 100.7 kg (222 lb)   BMI 33.75 kg/m    BMI= Body mass index is 33.75 kg/m .    Lower Extremity Physical Exam:      Neurovascular status remains unchanged.  Muscular exam is within normal limits to major muscle groups.  Integument is intact.      Noted decreased pain on palpation over the Achilles tendon on the right.  No surrounding erythema noted.  Noted improved ankle range of motion on the right.       Assessment:     ICD-10-CM    1. Achilles tendon injury, right, subsequent encounter  S86.001D        Plan:    I have explained to Micah about the progress of the conditions.  At this time, the patient was educated on the importance of offloading supportive shoes and other devices.  I demonstrated to the patient calf stretches to perform every hour daily until symptoms resolve.  After symptoms resolve, the patient was advised to perform the stretches 3 times daily to prevent future recurrence.  The patient was instructed to perform warm soaks with Epson salt after which to also apply over-the-counter Voltaren gel to deeply massage the injured tissue.  The patient was instructed to do this on a daily basis until symptoms resolve.  The patient may also take over-the-counter NSAID medication, if tolerated, to help further reduce the inflammation tissue.   The patient was advised to take this type of medication with food to " prevent stomach irritation and to stop taking the medication if stomach irritation occurs.  The patient was fitted with a Tri-Lock ankle brace that will aid in offloading the tension forces to the soft tissues and prevent further inflammation.   The patient will return in four weeks for reevaluation if the symptoms do not resolve.      Micah verbalized agreement with and understanding of the rational for the diagnosis and treatment plan.  All questions were answered to best of my ability and the patient's satisfaction. The patient was advised to contact the clinic with any questions that may arise after the clinic visit.      Disclaimer: This note consists of symbols derived from keyboarding, dictation and/or voice recognition software. As a result, there may be errors in the script that have gone undetected. Please consider this when interpreting information found in this chart.       CARLYLE Lira.P.JACOBY., F.A.C.F.A.S.      Again, thank you for allowing me to participate in the care of your patient.        Sincerely,        Uri Baird DPM

## 2021-06-21 DIAGNOSIS — N32.89 BLADDER SPASMS: ICD-10-CM

## 2021-06-22 ENCOUNTER — HOSPITAL ENCOUNTER (OUTPATIENT)
Dept: PHYSICAL THERAPY | Facility: CLINIC | Age: 56
Setting detail: THERAPIES SERIES
End: 2021-06-22
Attending: PODIATRIST
Payer: COMMERCIAL

## 2021-06-22 PROCEDURE — 97110 THERAPEUTIC EXERCISES: CPT | Mod: GP

## 2021-06-22 PROCEDURE — 97112 NEUROMUSCULAR REEDUCATION: CPT | Mod: GP

## 2021-06-23 NOTE — TELEPHONE ENCOUNTER
Janis,    Routing refill request to provider for review/approval because:  Refilled for 30 days on 6-7-21, this script is for 90 day supply, ok to fill?    GAVI Jackson

## 2021-06-24 RX ORDER — OXYBUTYNIN CHLORIDE 10 MG/1
TABLET, EXTENDED RELEASE ORAL
Qty: 90 TABLET | Refills: 1 | Status: SHIPPED | OUTPATIENT
Start: 2021-06-24 | End: 2021-12-29

## 2021-07-01 ENCOUNTER — HOSPITAL ENCOUNTER (OUTPATIENT)
Dept: PHYSICAL THERAPY | Facility: CLINIC | Age: 56
Setting detail: THERAPIES SERIES
End: 2021-07-01
Attending: PODIATRIST
Payer: COMMERCIAL

## 2021-07-01 PROCEDURE — 97110 THERAPEUTIC EXERCISES: CPT | Mod: GP

## 2021-07-01 PROCEDURE — 97112 NEUROMUSCULAR REEDUCATION: CPT | Mod: GP

## 2021-09-18 ENCOUNTER — HEALTH MAINTENANCE LETTER (OUTPATIENT)
Age: 56
End: 2021-09-18

## 2021-11-10 DIAGNOSIS — E03.9 HYPOTHYROIDISM, UNSPECIFIED TYPE: ICD-10-CM

## 2021-11-10 RX ORDER — LEVOTHYROXINE SODIUM 137 UG/1
TABLET ORAL
Qty: 60 TABLET | Refills: 1 | Status: SHIPPED | OUTPATIENT
Start: 2021-11-10 | End: 2021-12-31

## 2021-11-10 RX ORDER — LEVOTHYROXINE SODIUM 137 UG/1
TABLET ORAL
Qty: 60 TABLET | Refills: 1 | Status: CANCELLED | OUTPATIENT
Start: 2021-11-10

## 2021-11-10 NOTE — TELEPHONE ENCOUNTER
Patient is calling to follow up medication refill. Patient was out for 2 days now.    Janae Finch on 11/10/2021 at 2:32 PM

## 2021-11-11 NOTE — TELEPHONE ENCOUNTER
Pending Prescriptions:                       Disp   Refills    levothyroxine (SYNTHROID/LEVOTHROID) 137 *60 tab*1            Sig: Take 1 tablet of 137 mcg with a tablet of 150 mcg           to total 287mcg daily    Routing refill request to provider for review/approval because:  Labs not current:  TSH is overdue.  Levothyroxine dose was reduced in April and pt was supposed to get rechecked 6 weeks later.    Pt informed.  He has been out of medication for 3 days.    Jenifer Durham RN

## 2021-11-11 NOTE — TELEPHONE ENCOUNTER
"Pending Prescriptions:                       Disp   Refills    levothyroxine (SYNTHROID/LEVOTHROID) 137 *60 tab*1            Sig: Take 1 tablet of 137 mcg with a tablet of 150 mcg           to total 287mcg daily    Requested Prescriptions   Pending Prescriptions Disp Refills     levothyroxine (SYNTHROID/LEVOTHROID) 137 MCG tablet 60 tablet 1     Sig: Take 1 tablet of 137 mcg with a tablet of 150 mcg to total 287mcg daily       Thyroid Protocol Failed - 11/10/2021  6:35 PM        Failed - Normal TSH on file in past 12 months     Recent Labs   Lab Test 04/02/21  1606   TSH 0.12*              Passed - Patient is 12 years or older        Passed - Recent (12 mo) or future (30 days) visit within the authorizing provider's specialty     Patient has had an office visit with the authorizing provider or a provider within the authorizing providers department within the previous 12 mos or has a future within next 30 days. See \"Patient Info\" tab in inbasket, or \"Choose Columns\" in Meds & Orders section of the refill encounter.              Passed - Medication is active on med list             "

## 2021-11-11 NOTE — TELEPHONE ENCOUNTER
Covering:  Refills sent.  He should have labs rechecked once he is back on medication consistently for 6 weeks.    Thanks,  Zander Miller MD

## 2021-12-20 DIAGNOSIS — E03.9 HYPOTHYROIDISM, UNSPECIFIED TYPE: ICD-10-CM

## 2021-12-21 RX ORDER — LEVOTHYROXINE SODIUM 150 UG/1
TABLET ORAL
Qty: 90 TABLET | Refills: 0 | Status: SHIPPED | OUTPATIENT
Start: 2021-12-21 | End: 2022-03-28

## 2021-12-21 NOTE — TELEPHONE ENCOUNTER
"Has lab scheduled for 12/27/21    Requested Prescriptions   Pending Prescriptions Disp Refills     levothyroxine (SYNTHROID/LEVOTHROID) 150 MCG tablet 90 tablet 1     Sig: TAKE 1 TABLET (150MCG) WITH A 137 MCG TABLET TO EQUAL 287 MCG DAILY       Thyroid Protocol Failed - 12/20/2021 12:52 PM        Failed - Normal TSH on file in past 12 months     Recent Labs   Lab Test 04/02/21  1606   TSH 0.12*              Passed - Patient is 12 years or older        Passed - Recent (12 mo) or future (30 days) visit within the authorizing provider's specialty     Patient has had an office visit with the authorizing provider or a provider within the authorizing providers department within the previous 12 mos or has a future within next 30 days. See \"Patient Info\" tab in inbasket, or \"Choose Columns\" in Meds & Orders section of the refill encounter.              Passed - Medication is active on med list             "

## 2021-12-23 NOTE — TELEPHONE ENCOUNTER
"Requested Prescriptions   Pending Prescriptions Disp Refills     oxybutynin ER (DITROPAN-XL) 10 MG 24 hr tablet [Pharmacy Med Name: OXYBUTYNIN CL ER 10 MG TABLET] 90 tablet 3     Sig: TAKE 1 TABLET BY MOUTH EVERY DAY       Muscarinic Antagonists (Urinary Incontinence Agents) Passed - 3/5/2021  7:17 PM        Passed - Recent (12 mo) or future (30 days) visit within the authorizing provider's specialty     Patient has had an office visit with the authorizing provider or a provider within the authorizing providers department within the previous 12 mos or has a future within next 30 days. See \"Patient Info\" tab in inbasket, or \"Choose Columns\" in Meds & Orders section of the refill encounter.              Passed - Medication is Oxybutynin and patient is 5 years of age or older        Passed - Patient does not have a diagnosis of glaucoma on the problem list     If glaucoma diagnosis is new, refer refill to physician.          Passed - Medication is active on med list        Passed - Patient is 18 years of age or older             " DISPLAY PLAN FREE TEXT

## 2021-12-27 ENCOUNTER — LAB (OUTPATIENT)
Dept: LAB | Facility: CLINIC | Age: 56
End: 2021-12-27
Payer: COMMERCIAL

## 2021-12-27 DIAGNOSIS — E03.9 HYPOTHYROIDISM, UNSPECIFIED TYPE: ICD-10-CM

## 2021-12-27 LAB
T4 FREE SERPL-MCNC: 1.17 NG/DL (ref 0.76–1.46)
TSH SERPL DL<=0.005 MIU/L-ACNC: 0.11 MU/L (ref 0.4–4)

## 2021-12-27 PROCEDURE — 84443 ASSAY THYROID STIM HORMONE: CPT

## 2021-12-27 PROCEDURE — 84439 ASSAY OF FREE THYROXINE: CPT

## 2021-12-27 PROCEDURE — 36415 COLL VENOUS BLD VENIPUNCTURE: CPT

## 2021-12-29 ENCOUNTER — TELEPHONE (OUTPATIENT)
Dept: FAMILY MEDICINE | Facility: CLINIC | Age: 56
End: 2021-12-29
Payer: COMMERCIAL

## 2021-12-29 DIAGNOSIS — N32.89 BLADDER SPASMS: ICD-10-CM

## 2021-12-29 RX ORDER — OXYBUTYNIN CHLORIDE 10 MG/1
TABLET, EXTENDED RELEASE ORAL
Qty: 90 TABLET | Refills: 0 | Status: SHIPPED | OUTPATIENT
Start: 2021-12-29 | End: 2022-03-29

## 2021-12-29 NOTE — TELEPHONE ENCOUNTER
Pt called for results. Set up virtual appt for Friday for medication mgmnt per note        Daniel French RN

## 2021-12-31 ENCOUNTER — VIRTUAL VISIT (OUTPATIENT)
Dept: FAMILY MEDICINE | Facility: CLINIC | Age: 56
End: 2021-12-31
Payer: COMMERCIAL

## 2021-12-31 DIAGNOSIS — E03.9 HYPOTHYROIDISM, UNSPECIFIED TYPE: ICD-10-CM

## 2021-12-31 PROCEDURE — 99213 OFFICE O/P EST LOW 20 MIN: CPT | Mod: TEL | Performed by: FAMILY MEDICINE

## 2021-12-31 RX ORDER — LEVOTHYROXINE SODIUM 137 UG/1
TABLET ORAL
Qty: 90 TABLET | Refills: 3 | Status: SHIPPED | OUTPATIENT
Start: 2021-12-31 | End: 2023-01-12

## 2021-12-31 RX ORDER — MULTIPLE VITAMINS W/ MINERALS TAB 9MG-400MCG
1 TAB ORAL DAILY
COMMUNITY

## 2021-12-31 NOTE — PROGRESS NOTES
Micah is a 56 year old who is being evaluated via a billable telephone visit.      What phone number would you like to be contacted at? [phone]  How would you like to obtain your AVS? MyChart    Assessment & Plan     Hypothyroidism, unspecified type  Currently on Levothyroxine 300 mcg daily.   TSH 0.11, T4 1.17 on 12/27/2021  -- Will decrease dose down to 287 mcg daily ( 150 mcg tablet and 137 mcg tablet)   -- Recheck thyroid test in 6-8 weeks.   - levothyroxine (SYNTHROID/LEVOTHROID) 137 MCG tablet  Dispense: 90 tablet; Refill: 3  - TSH with free T4 reflex    The risks, benefits and treatment options of prescribed medications or other treatments have been discussed with the patient. The patient verbalized their understanding and should call or follow up if no improvement or if they develop further problems.      Juventino Jin, [facility]   Micah is a 56 year old who presents for the following health issues     HPI     Hypothyroidism Follow-up      Since last visit, patient describes the following symptoms: Weight stable, no hair loss, no skin changes, no constipation, no loose stools      How many servings of fruits and vegetables do you eat daily?  0-1    On average, how many sweetened beverages do you drink each day (Examples: soda, juice, sweet tea, etc.  Do NOT count diet or artificially sweetened beverages)?   1    How many days per week do you exercise enough to make your heart beat faster? 5-at work    How many minutes a day do you exercise enough to make your heart beat faster? 9 or less    How many days per week do you miss taking your medication? 0    Recent labs on 12/27/2021  TSH 0.11, T4 1.17  Currently on Levothyroxine 300 mcg daily.   Asymptomatic at this time.   Discussed his thyroid levels. Needs to lower thyroid dose.       Review of Systems   Constitutional, HEENT, cardiovascular, pulmonary, gi and gu systems are negative, except as otherwise  noted.      Objective           Vitals:  No vitals were obtained today due to virtual visit.    Physical Exam   Phone visit, no exam.     Phone call duration: 8 minutes

## 2022-03-05 ENCOUNTER — HEALTH MAINTENANCE LETTER (OUTPATIENT)
Age: 57
End: 2022-03-05

## 2022-03-28 ENCOUNTER — TELEPHONE (OUTPATIENT)
Dept: FAMILY MEDICINE | Facility: CLINIC | Age: 57
End: 2022-03-28
Payer: COMMERCIAL

## 2022-03-28 DIAGNOSIS — N32.89 BLADDER SPASMS: ICD-10-CM

## 2022-03-28 DIAGNOSIS — E03.9 HYPOTHYROIDISM, UNSPECIFIED TYPE: ICD-10-CM

## 2022-03-28 RX ORDER — LEVOTHYROXINE SODIUM 150 UG/1
TABLET ORAL
Qty: 90 TABLET | Refills: 2 | Status: SHIPPED | OUTPATIENT
Start: 2022-03-28 | End: 2022-12-23

## 2022-03-28 NOTE — TELEPHONE ENCOUNTER
Reason for Call:  Medication refill:    Do you use a [facility]?  Name of the pharmacy and phone number for the current request:  [facility] [place] [phone]    Name of the medication requested: Levothyroxine 150, patient needs 3 (3month) refills= 9 months    Can we leave a detailed message on this number? YES    Phone number patient can be reached at: Home number on file [phone] (home)    Best Time: Any    Call taken on 3/28/2022 at 11:05 AM by Jovita Robles

## 2022-03-29 RX ORDER — OXYBUTYNIN CHLORIDE 10 MG/1
TABLET, EXTENDED RELEASE ORAL
Qty: 90 TABLET | Refills: 2 | Status: SHIPPED | OUTPATIENT
Start: 2022-03-29 | End: 2022-12-22

## 2022-08-29 ENCOUNTER — TELEPHONE (OUTPATIENT)
Dept: FAMILY MEDICINE | Facility: CLINIC | Age: 57
End: 2022-08-29

## 2022-08-29 NOTE — TELEPHONE ENCOUNTER
Patient Quality Outreach    Patient is due for the following:   Colon Cancer Screening  IVD  -  BP Check  Physical Preventive Adult Physical    Next Steps:   Schedule a Adult Preventative    Type of outreach:    Sent letter.      Questions for provider review:    None     Jenifer Rose, [facility]

## 2022-08-29 NOTE — LETTER
August 29, 2022      Micah Lay  [address] [address] [facility] [address]      Your healthcare team cares about your health. To provide you with the best care, we have reviewed your chart and based on our findings, we see that you are due to:     - OTHER FOLLOW UP:  Please call [phone] to schedule your physical that is due.    If you have already completed these items, please contact the clinic via phone or Mychart so your care team can review and update your records.  Thank you for choosing [facility] for your healthcare needs. For any questions, concerns, or to schedule an appointment please contact the clinic.       Healthy Regards,      Your [facility] Care Team

## 2022-10-25 NOTE — PROGRESS NOTES
"[facility]    Outpatient Physical Therapy Discharge Note  Patient: Micah Lay  : 1965    Beginning/End Dates of Reporting Period:  21 to 21    Referring Provider: Uri Baird DPM    Therapy Diagnosis: Achilles tendon repair and difficulty with ambulation and stair climbing     Client Self Report: Pt reports he will be returning to work next Mon. Has been amb without cam boot for 1.5 weeks without pain.    Objective Measurements:     Objective Measure: Gait  Details: Ambulates without CAM boot or crutches    Objective Measure: R Ankle A/PROM  Details: DF11/16*; PF 39/45* (L DF 15/21*; PF 40/45*)    Objective Measure: SLS  Details: L 26\", R 20\"    Objective Measure: R PF Strength  Details:       Goals:  Goal Identifier HEP   Goal Description Pt will be independent in [facility] in order to achieve and maintain long term treatment goals.   Target Date 21   Date Met  21   Progress (detail required for progress note):       Goal Identifier Ambulation   Goal Description Pt will be able to ambulate for community distances without an assistive device with a minimal increase in pain 1-2/10.   Target Date 21   Date Met      Progress (detail required for progress note):       Goal Identifier Stairs   Goal Description Pt will be able to ascend and descend 1 flight of stairs with a reciprical gait pattern and handrail assist safely.   Target Date 07/15/21   Date Met      Progress (detail required for progress note):       Goal Identifier Work   Goal Description Pt will be able to return to work and perform all work duties with a minimal increase in pain 1-2/10.   Target Date 21   Date Met      Progress (detail required for progress note):         Plan:  Discharge from therapy.    Discharge:    Reason for Discharge: Patient has failed to schedule further " appointments.    Discharge Plan: Patient to continue home program.    Jovita Dent PT

## 2022-11-20 ENCOUNTER — HEALTH MAINTENANCE LETTER (OUTPATIENT)
Age: 57
End: 2022-11-20

## 2022-12-02 ENCOUNTER — NURSE TRIAGE (OUTPATIENT)
Dept: NURSING | Facility: CLINIC | Age: 57
End: 2022-12-02

## 2022-12-02 NOTE — TELEPHONE ENCOUNTER
Pt calling with concerns about;    11/29/22 began with dry cough and headache  Today, feels worse;  Sore throat  Body aches  Cough is very deep   Does not have a thermometer but feels hot and chills    Pt Denies;  Difficulty breathing/SOB  Chest pain  Hx of asthma/wheezing    According to the protocol, patient should be able to monitor/manage these symptoms at home.  Care advice given/when to call back. Patient verbalizes understanding and agrees with plan of care.     Alanis Crawford RN, Nurse Advisor 6:05 PM 12/2/2022    Reason for Disposition    Cough    Additional Information    Negative: SEVERE difficulty breathing (e.g., struggling for each breath, speaks in single words)    Negative: Bluish (or gray) lips or face now    Negative: [1] Difficulty breathing AND [2] exposure to flames, smoke, or fumes    Negative: [1] Stridor AND [2] difficulty breathing    Negative: Sounds like a life-threatening emergency to the triager    Negative: [1] Previous asthma attacks AND [2] this feels like asthma attack    Negative: Dry cough (non-productive;  no sputum or minimal clear sputum)    Negative: [1] MODERATE difficulty breathing (e.g., speaks in phrases, SOB even at rest, pulse 100-120) AND [2] still present when not coughing    Negative: Chest pain  (Exception: MILD central chest pain, present only when coughing)    Negative: Patient sounds very sick or weak to the triager    Negative: [1] MILD difficulty breathing (e.g., minimal/no SOB at rest, SOB with walking, pulse <100) AND [2] still present when not coughing    Negative: [1] Coughed up blood AND [2] > 1 tablespoon (15 ml)  (Exception: Blood-tinged sputum.)    Negative: Fever > 103 F (39.4 C)    Negative: [1] Fever > 101 F (38.3 C) AND [2] age > 60 years    Negative: [1] Fever > 100.0 F (37.8 C) AND [2] bedridden (e.g., nursing home patient, CVA, chronic illness, recovering from surgery)    Negative: [1] Fever > 100.0 F (37.8 C) AND [2] diabetes mellitus or weak  immune system (e.g., HIV positive, cancer chemo, splenectomy, organ transplant, chronic steroids)    Negative: Wheezing is present    Negative: SEVERE coughing spells (e.g., whooping sound after coughing, vomiting after coughing)    Negative: [1] Continuous (nonstop) coughing interferes with work or school AND [2] no improvement using cough treatment per Care Advice    Negative: Coughing up mildred-colored (reddish-brown) sputum    Negative: Fever present > 3 days (72 hours)    Negative: [1] Fever returns after gone for over 24 hours AND [2] symptoms worse or not improved    Negative: [1] Using nasal washes and pain medicine > 24 hours AND [2] sinus pain (around cheekbone or eye) persists    Negative: Earache    Negative: [1] Known COPD or other severe lung disease (i.e., bronchiectasis, cystic fibrosis, lung surgery) AND [2] worsening symptoms (i.e., increased sputum purulence or amount, increased breathing difficulty    Negative: Cough has been present for > 3 weeks    Negative: [1] Nasal discharge AND [2] present > 10 days    Negative: [1] Coughed up blood-tinged sputum AND [2] more than once    Negative: Exposure to TB (Tuberculosis)    Protocols used: COUGH - ACUTE [id number]

## 2022-12-19 ENCOUNTER — TELEPHONE (OUTPATIENT)
Dept: FAMILY MEDICINE | Facility: CLINIC | Age: 57
End: 2022-12-19

## 2022-12-19 NOTE — TELEPHONE ENCOUNTER
Patient Quality Outreach    Patient is due for the following:   Colon Cancer Screening  Physical Preventive Adult Physical    Next Steps:   Schedule a Adult Preventative    Type of outreach:    Sent letter.      Questions for provider review:    None     Jenifer Rose, [facility]

## 2022-12-19 NOTE — LETTER
December 19, 2022      Micah Lay  [address] [facility] [address]      Your healthcare team cares about your health. To provide you with the best care, we have reviewed your chart and based on our findings, we see that you are due to:     PREVENTATIVE VISIT: Physical    If you have already completed these items, please contact the clinic via phone or Mychart so your care team can review and update your records.  Thank you for choosing [facility] for your healthcare needs. For any questions, concerns, or to schedule an appointment please contact the clinic.       Healthy Regards,      Your [facility] Care Team

## 2022-12-22 DIAGNOSIS — N32.89 BLADDER SPASMS: ICD-10-CM

## 2022-12-22 DIAGNOSIS — E03.9 HYPOTHYROIDISM, UNSPECIFIED TYPE: ICD-10-CM

## 2022-12-23 RX ORDER — OXYBUTYNIN CHLORIDE 10 MG/1
10 TABLET, EXTENDED RELEASE ORAL DAILY
Qty: 90 TABLET | Refills: 0 | Status: SHIPPED | OUTPATIENT
Start: 2022-12-23 | End: 2023-02-01

## 2022-12-23 RX ORDER — LEVOTHYROXINE SODIUM 150 UG/1
TABLET ORAL
Qty: 90 TABLET | Refills: 0 | Status: SHIPPED | OUTPATIENT
Start: 2022-12-23 | End: 2023-02-03

## 2022-12-23 NOTE — TELEPHONE ENCOUNTER
Synthroid Routing to ordering provider for consideration, not on refill protocol.           Beatriz Becker     RN MSN

## 2022-12-23 NOTE — TELEPHONE ENCOUNTER
levothyroxine (SYNTHROID/LEVOTHROID) 150 MCG tablet    levothyroxine (SYNTHROID/LEVOTHROID) 137 MCG tablet

## 2022-12-23 NOTE — TELEPHONE ENCOUNTER
Oxybutynin Prescription approved per [facility] Refill Protocol     Beatriz Becker RN MSN

## 2023-01-12 DIAGNOSIS — E03.9 HYPOTHYROIDISM, UNSPECIFIED TYPE: ICD-10-CM

## 2023-01-12 NOTE — TELEPHONE ENCOUNTER
Pt needs refill on 137 dose of Levothyroxine - He takes BOTH doses - to get him through until his upcoming appt.  No need to call patient back, unless there are questions or problems.

## 2023-01-12 NOTE — TELEPHONE ENCOUNTER
Pending Prescriptions:                       Disp   Refills    levothyroxine (SYNTHROID/LEVOTHROID) 137 *90 tab*0            Sig: Take 1 tablet of 137 mcg with a tablet of 150 mcg           to total 287mcg daily    Routing refill request to provider for review/approval because:  Patient needs to be seen because it has been more than 1 year since last office visit.  Pt last seen nearly 2 years ago on 4/1/21.  Has pending appt with provider on 2/1/22.  Asks for refill to cover until seen?    Jenifer Durham RN       TSH   Date Value Ref Range Status   12/27/2021 0.11 (L) 0.40 - 4.00 mU/L Final   04/02/2021 0.12 (L) 0.40 - 4.00 mU/L Final

## 2023-01-13 RX ORDER — LEVOTHYROXINE SODIUM 137 UG/1
TABLET ORAL
Qty: 30 TABLET | Refills: 0 | Status: SHIPPED | OUTPATIENT
Start: 2023-01-13 | End: 2023-02-03

## 2023-02-01 ENCOUNTER — OFFICE VISIT (OUTPATIENT)
Dept: FAMILY MEDICINE | Facility: CLINIC | Age: 58
End: 2023-02-01
Payer: COMMERCIAL

## 2023-02-01 VITALS
RESPIRATION RATE: 18 BRPM | OXYGEN SATURATION: 98 % | DIASTOLIC BLOOD PRESSURE: 88 MMHG | SYSTOLIC BLOOD PRESSURE: 130 MMHG | BODY MASS INDEX: 31.83 KG/M2 | HEIGHT: 68 IN | TEMPERATURE: 96.5 F | WEIGHT: 210 LBS | HEART RATE: 65 BPM

## 2023-02-01 DIAGNOSIS — Z00.00 ROUTINE GENERAL MEDICAL EXAMINATION AT A HEALTH CARE FACILITY: Primary | ICD-10-CM

## 2023-02-01 DIAGNOSIS — N32.89 BLADDER SPASMS: ICD-10-CM

## 2023-02-01 DIAGNOSIS — E03.9 HYPOTHYROIDISM, UNSPECIFIED TYPE: ICD-10-CM

## 2023-02-01 DIAGNOSIS — Z12.5 ENCOUNTER FOR SCREENING FOR MALIGNANT NEOPLASM OF PROSTATE: ICD-10-CM

## 2023-02-01 DIAGNOSIS — Z76.89 SLEEP CONCERN: ICD-10-CM

## 2023-02-01 DIAGNOSIS — Z12.11 SCREEN FOR COLON CANCER: ICD-10-CM

## 2023-02-01 DIAGNOSIS — E78.5 HYPERLIPIDEMIA LDL GOAL <130: ICD-10-CM

## 2023-02-01 LAB
ANION GAP SERPL CALCULATED.3IONS-SCNC: 9 MMOL/L (ref 7–15)
BUN SERPL-MCNC: 21.8 MG/DL (ref 6–20)
CALCIUM SERPL-MCNC: 9.8 MG/DL (ref 8.6–10)
CHLORIDE SERPL-SCNC: 103 MMOL/L (ref 98–107)
CHOLEST SERPL-MCNC: 251 MG/DL
CREAT SERPL-MCNC: 0.9 MG/DL (ref 0.67–1.17)
DEPRECATED HCO3 PLAS-SCNC: 30 MMOL/L (ref 22–29)
GFR SERPL CREATININE-BSD FRML MDRD: >90 ML/MIN/1.73M2
GLUCOSE SERPL-MCNC: 93 MG/DL (ref 70–99)
HDLC SERPL-MCNC: 50 MG/DL
LDLC SERPL CALC-MCNC: 151 MG/DL
NONHDLC SERPL-MCNC: 201 MG/DL
POTASSIUM SERPL-SCNC: 4 MMOL/L (ref 3.4–5.3)
PSA SERPL-MCNC: 1.29 NG/ML (ref 0–3.5)
SODIUM SERPL-SCNC: 142 MMOL/L (ref 136–145)
T4 FREE SERPL-MCNC: 2.22 NG/DL (ref 0.9–1.7)
TRIGL SERPL-MCNC: 252 MG/DL
TSH SERPL DL<=0.005 MIU/L-ACNC: 0.1 UIU/ML (ref 0.3–4.2)

## 2023-02-01 PROCEDURE — 84443 ASSAY THYROID STIM HORMONE: CPT | Performed by: FAMILY MEDICINE

## 2023-02-01 PROCEDURE — 36415 COLL VENOUS BLD VENIPUNCTURE: CPT | Performed by: FAMILY MEDICINE

## 2023-02-01 PROCEDURE — 99396 PREV VISIT EST AGE 40-64: CPT | Performed by: FAMILY MEDICINE

## 2023-02-01 PROCEDURE — 99214 OFFICE O/P EST MOD 30 MIN: CPT | Mod: 25 | Performed by: FAMILY MEDICINE

## 2023-02-01 PROCEDURE — G0103 PSA SCREENING: HCPCS | Performed by: FAMILY MEDICINE

## 2023-02-01 PROCEDURE — 80048 BASIC METABOLIC PNL TOTAL CA: CPT | Performed by: FAMILY MEDICINE

## 2023-02-01 PROCEDURE — 84439 ASSAY OF FREE THYROXINE: CPT | Performed by: FAMILY MEDICINE

## 2023-02-01 PROCEDURE — 80061 LIPID PANEL: CPT | Performed by: FAMILY MEDICINE

## 2023-02-01 RX ORDER — PRAVASTATIN SODIUM 40 MG
40 TABLET ORAL DAILY
Qty: 90 TABLET | Refills: 3 | Status: CANCELLED | OUTPATIENT
Start: 2023-02-01

## 2023-02-01 RX ORDER — LEVOTHYROXINE SODIUM 137 UG/1
TABLET ORAL
Qty: 30 TABLET | Refills: 0 | Status: CANCELLED | OUTPATIENT
Start: 2023-02-01

## 2023-02-01 RX ORDER — OXYBUTYNIN CHLORIDE 10 MG/1
10 TABLET, EXTENDED RELEASE ORAL DAILY
Qty: 90 TABLET | Refills: 3 | Status: SHIPPED | OUTPATIENT
Start: 2023-02-01 | End: 2023-12-27

## 2023-02-01 RX ORDER — LEVOTHYROXINE SODIUM 150 UG/1
TABLET ORAL
Qty: 90 TABLET | Refills: 0 | Status: CANCELLED | OUTPATIENT
Start: 2023-02-01

## 2023-02-01 ASSESSMENT — ENCOUNTER SYMPTOMS
JOINT SWELLING: 0
CHILLS: 0
EYE PAIN: 0
HEMATURIA: 0
COUGH: 0
FEVER: 0
HEARTBURN: 0
ARTHRALGIAS: 0
NERVOUS/ANXIOUS: 0
HEMATOCHEZIA: 0
SHORTNESS OF BREATH: 0
NAUSEA: 0
ABDOMINAL PAIN: 0
DIARRHEA: 0
DIZZINESS: 0
PARESTHESIAS: 0
PALPITATIONS: 0
MYALGIAS: 0
CONSTIPATION: 0
DYSURIA: 0
FREQUENCY: 0
HEADACHES: 0
WEAKNESS: 0
SORE THROAT: 0

## 2023-02-01 ASSESSMENT — PAIN SCALES - GENERAL: PAINLEVEL: NO PAIN (0)

## 2023-02-01 NOTE — PROGRESS NOTES
SUBJECTIVE:   CC: Micah is an 57 year old who presents for preventative health visit.     Patient has been advised of split billing requirements and indicates understanding: Yes  Healthy Habits:     Getting at least 3 servings of Calcium per day:  NO    Bi-annual eye exam:  Yes    Dental care twice a year:  Yes    Sleep apnea or symptoms of sleep apnea:  None    Diet:  Regular (no restrictions)    Frequency of exercise:  1 day/week    Duration of exercise:  15-30 minutes    Taking medications regularly:  Yes    Medication side effects:  None    PHQ-2 Total Score: 0    Additional concerns today:  No     Cholesterol: Pravastatin 40 mg daily ( not taking)   Diabetes: screen today fasting glucose.   Colon Cancer: needs screening. Wishes to proceed with FIT.   Prostate: screen today.   HIV screen: deferred by patient   Hepatitis C screen: deferred by patient  Diet/Exercise: active at work   Tobacco: non-user.     Sleep concerns  Ongoing for last 5 years.   Takes melatonin 5-10 mg at night.   Issues with staying asleep.   Will wake up and have issues with staying asleep.   No snoring.       Urinary concerns   On Oxybutynin ER 10 mg daily.   Helping with bladder concerns.   No issues, tolerating medication.     Hyperlipidemia Follow-Up      Are you regularly taking any medication or supplement to lower your cholesterol?   No-has been off for 6 months. Has lost weight and hoping he does not need medication. He is fasting today.    Are you having muscle aches or other side effects that you think could be caused by your cholesterol lowering medication?  No    Not taking Pravastatin 40 mg daily.       The 10-year ASCVD risk score (Daniel JUDD, et al., 2019) is: 9.4%    Values used to calculate the score:      Age: 57 years      Sex: Male      Is Non- : No      Diabetic: No      Tobacco smoker: No      Systolic Blood Pressure: 130 mmHg      Is BP treated: No      HDL Cholesterol: 42 mg/dL      Total  Cholesterol: 254 mg/dL      Hypothyroidism Follow-up      Since last visit, patient describes the following symptoms: weight loss of 20 lbs    Levothyroxine 287 mcg daily.   Prior was hyperthyroid.  Did not return for recheck.  Check lab work today.       Today's PHQ-2 Score:   PHQ-2 ( 1999 [employer]) 2/1/2023   Q1: Little interest or pleasure in doing things 0   Q2: Feeling down, depressed or hopeless 0   PHQ-2 Score 0   PHQ-2 Total Score (12-17 Years)- Positive if 3 or more points; Administer PHQ-A if positive -   Q1: Little interest or pleasure in doing things Not at all   Q2: Feeling down, depressed or hopeless Not at all   PHQ-2 Score 0       Have you ever done Advance Care Planning? (For example, a Health Directive, POLST, or a discussion with a medical provider or your loved ones about your wishes): Yes, patient states has an Advance Care Planning document and will bring a copy to the clinic.    Social History     Tobacco Use     Smoking status: Never     Smokeless tobacco: Never   Substance Use Topics     Alcohol use: Yes     Comment: rare     If you drink alcohol do you typically have >3 drinks per day or >7 drinks per week? No    Alcohol Use 2/1/2023   Prescreen: >3 drinks/day or >7 drinks/week? No   Prescreen: >3 drinks/day or >7 drinks/week? -       Last PSA:   PSA   Date Value Ref Range Status   01/06/2020 1.28 0 - 4 ug/L Final     Comment:     Assay Method:  Chemiluminescence using Siemens Vista analyzer       Reviewed orders with patient. Reviewed health maintenance and updated orders accordingly - Yes    Reviewed and updated as needed this visit by clinical staff   Tobacco  Allergies  Meds  Problems  Med Hx  Surg Hx  Fam Hx          Reviewed and updated as needed this visit by Provider   Tobacco  Allergies  Meds  Problems  Med Hx  Surg Hx  Fam Hx           Review of Systems   Constitutional: Negative for chills and fever.   HENT: Negative for congestion, ear pain, hearing loss and sore  "throat.    Eyes: Negative for pain and visual disturbance.   Respiratory: Negative for cough and shortness of breath.    Cardiovascular: Negative for chest pain, palpitations and peripheral edema.   Gastrointestinal: Negative for abdominal pain, constipation, diarrhea, heartburn, hematochezia and nausea.   Genitourinary: Negative for dysuria, frequency, genital sores, hematuria, impotence, penile discharge and urgency.   Musculoskeletal: Negative for arthralgias, joint swelling and myalgias.   Skin: Negative for rash.   Neurological: Negative for dizziness, weakness, headaches and paresthesias.   Psychiatric/Behavioral: Negative for mood changes. The patient is not nervous/anxious.        OBJECTIVE:   /88 (BP Location: Left arm, Patient Position: Chair, Cuff Size: Adult Large)   Pulse 65   Temp (!) 96.5  F (35.8  C) (Tympanic)   Resp 18   Ht 1.734 m (5' 8.25\")   Wt 95.3 kg (210 lb)   SpO2 98%   BMI 31.70 kg/m      Physical Exam  GENERAL: healthy, alert and no distress  EYES: Eyes grossly normal to inspection, PERRL and conjunctivae and sclerae normal  HENT: ear canals and TM's normal, nose and mouth without ulcers or lesions  NECK: no adenopathy, no asymmetry, masses, or scars and thyroid normal to palpation  RESP: lungs clear to auscultation - no rales, rhonchi or wheezes  CV: regular rate and rhythm, normal S1 S2, no S3 or S4, no murmur, click or rub, no peripheral edema and peripheral pulses strong  ABDOMEN: soft, nontender, no hepatosplenomegaly, no masses and bowel sounds normal  MS: no gross musculoskeletal defects noted, no edema  SKIN: no suspicious lesions or rashes  NEURO: Normal strength and tone, mentation intact and speech normal  PSYCH: mentation appears normal, affect normal/bright      ASSESSMENT/PLAN:   Micah was seen today for physical and sleep problem.    Diagnoses and all orders for this visit:    Routine general medical examination at a health care facility  Cholesterol: " Pravastatin 40 mg daily ( not taking)   Diabetes: screen today fasting glucose.   Colon Cancer: needs screening. Wishes to proceed with FIT.   Prostate: screen today.   HIV screen: deferred by patient   Hepatitis C screen: deferred by patient  Diet/Exercise: active at work   Tobacco: non-user.   -     Basic metabolic panel  (Ca, Cl, CO2, Creat, Gluc, K, Na, BUN)    Encounter for screening for malignant neoplasm of prostate  -     PSA, screen    Screen for colon cancer  -     Fecal colorectal cancer screen FIT - Future (S+30); Future    Hypothyroidism, unspecified type  Currently on levothyroxine 287 mcg daily.  Last TSH was suppressed he was supposed to follow-up for recheck but did not do so.  We will check thyroid level today.  Pending these results we will determine to continue on current therapy or likely decrease therapy.  -     TSH with free T4 reflex  -     Basic metabolic panel  (Ca, Cl, CO2, Creat, Gluc, K, Na, BUN)    Bladder spasms  Stable, refill provided  -     oxybutynin ER (DITROPAN XL) 10 MG 24 hr tablet; Take 1 tablet (10 mg) by mouth daily    Hyperlipidemia LDL goal <130  -     Lipid panel reflex to direct LDL Fasting    Sleep concern  Patient with issues with staying asleep.  Has taken melatonin with minimal improvement.  Discussed need to correct his thyroid issue first.  If thyroid testing satisfactory we will proceed with trazodone 50 to 100 mg at night to help with this.      Patient has been advised of split billing requirements and indicates understanding: Yes      COUNSELING:   Reviewed preventive health counseling, as reflected in patient instructions       Regular exercise       Healthy diet/nutrition       Colorectal cancer screening       Prostate cancer screening        He reports that he has never smoked. He has never used smokeless tobacco.            Juventino Jin, [facility]

## 2023-02-01 NOTE — PATIENT INSTRUCTIONS
Bladder irritants  Caffeine (eg, coffee, tea, chocolate, certain pain relievers, cold medications, and supplements)  Citrus fruits and juices  Carbonated beverages with or without caffeine (including sparkling mineral water)  Vitamin C supplements and large doses of vitamin B  Alcohol  Chili peppers and onions  Products containing aspartame or saccharin  Products containing vinegar  Spicy foods  Tomatoes and tomato-based foods      Lab work today.     Will send thyroid meds if thyroid testing is normal.     Will send trazodone for sleep if thyroid testing normal.   Take  mg nightly         Preventive Health Recommendations  Male Ages 50 - 64    Yearly exam:             See your health care provider every year in order to  o   Review health changes.   o   Discuss preventive care.    o   Review your medicines if your doctor has prescribed any.   Have a cholesterol test every 5 years, or more frequently if you are at risk for high cholesterol/heart disease.   Have a diabetes test (fasting glucose) every three years. If you are at risk for diabetes, you should have this test more often.   Have a colonoscopy at age 50, or have a yearly FIT test (stool test). These exams will check for colon cancer.    Talk with your health care provider about whether or not a prostate cancer screening test (PSA) is right for you.  You should be tested each year for STDs (sexually transmitted diseases), if you re at risk.     Shots: Get a flu shot each year. Get a tetanus shot every 10 years.     Nutrition:  Eat at least 5 servings of fruits and vegetables daily.   Eat whole-grain bread, whole-wheat pasta and brown rice instead of white grains and rice.   Get adequate Calcium and Vitamin D.     Lifestyle  Exercise for at least 150 minutes a week (30 minutes a day, 5 days a week). This will help you control your weight and prevent disease.   Limit alcohol to one drink per day.   No smoking.   Wear sunscreen to prevent skin cancer.    See your dentist every six months for an exam and cleaning.   See your eye doctor every 1 to 2 years.

## 2023-02-03 DIAGNOSIS — E03.9 HYPOTHYROIDISM, UNSPECIFIED TYPE: Primary | ICD-10-CM

## 2023-02-03 RX ORDER — LEVOTHYROXINE SODIUM 137 UG/1
TABLET ORAL
Qty: 180 TABLET | Refills: 1 | Status: SHIPPED | OUTPATIENT
Start: 2023-02-03 | End: 2023-06-28

## 2023-03-14 ENCOUNTER — TELEPHONE (OUTPATIENT)
Dept: FAMILY MEDICINE | Facility: CLINIC | Age: 58
End: 2023-03-14
Payer: COMMERCIAL

## 2023-03-14 DIAGNOSIS — G47.00 INSOMNIA, UNSPECIFIED TYPE: Primary | ICD-10-CM

## 2023-03-14 RX ORDER — TRAZODONE HYDROCHLORIDE 100 MG/1
50-100 TABLET ORAL AT BEDTIME
Qty: 90 TABLET | Refills: 1 | Status: SHIPPED | OUTPATIENT
Start: 2023-03-14 | End: 2024-10-02

## 2023-03-14 NOTE — TELEPHONE ENCOUNTER
Trazodone sent to pharmacy. Please have patient schedule lab appt to recheck his TSH as this is likely why he is having issues with sleeping.

## 2023-03-14 NOTE — TELEPHONE ENCOUNTER
Dr. Jin,    Patient is still having insomnia and would like trazodone prescribed.  [facility] in [place]. Ladan KIRK RN

## 2023-03-14 NOTE — TELEPHONE ENCOUNTER
Pt was notified, and call transferred to central scheduling to make a lab appt.    Christie Reese RN  [facility]

## 2023-03-22 ENCOUNTER — LAB (OUTPATIENT)
Dept: LAB | Facility: CLINIC | Age: 58
End: 2023-03-22
Payer: COMMERCIAL

## 2023-03-22 DIAGNOSIS — E03.9 HYPOTHYROIDISM, UNSPECIFIED TYPE: Primary | ICD-10-CM

## 2023-03-22 DIAGNOSIS — E03.9 HYPOTHYROIDISM, UNSPECIFIED TYPE: ICD-10-CM

## 2023-03-22 LAB
T4 FREE SERPL-MCNC: 2.21 NG/DL (ref 0.9–1.7)
TSH SERPL DL<=0.005 MIU/L-ACNC: 0.16 UIU/ML (ref 0.3–4.2)

## 2023-03-22 PROCEDURE — 36415 COLL VENOUS BLD VENIPUNCTURE: CPT

## 2023-03-22 PROCEDURE — 84439 ASSAY OF FREE THYROXINE: CPT

## 2023-03-22 PROCEDURE — 84443 ASSAY THYROID STIM HORMONE: CPT

## 2023-03-22 RX ORDER — LEVOTHYROXINE SODIUM 125 UG/1
250 TABLET ORAL DAILY
Qty: 180 TABLET | Refills: 0 | Status: SHIPPED | OUTPATIENT
Start: 2023-03-22 | End: 2023-06-28

## 2023-05-08 ENCOUNTER — TELEPHONE (OUTPATIENT)
Dept: FAMILY MEDICINE | Facility: CLINIC | Age: 58
End: 2023-05-08
Payer: COMMERCIAL

## 2023-05-08 NOTE — TELEPHONE ENCOUNTER
Patient Quality Outreach    Patient is due for the following:   Colon Cancer Screening    Next Steps:   Schedule a lab only visit for TSH and turn in FIT test.    Type of outreach:    Sent letter.      Questions for provider review:    None           Jenifer Rose, [facility]

## 2023-05-08 NOTE — LETTER
May 8, 2023    To  Micah Lay  [address] [facility] [address] [facility] [address]    Your team at [facility] cares about your health. We have reviewed your chart and based on our findings; we are making the following recommendations to better manage your health.     You are in particular need of attention regarding the following:     Colon cancer is now the second leading cause of cancer-related deaths in the [place] for both men and women and there are over 130,000 new cases and 50,000 deaths per year from colon cancer. Colonoscopies can prevent 90-95% of these deaths. Problem lesions can be removed before they ever become cancer. This test is not only looking for cancer, but also getting rid of precancerous lesions.   OTHER FOLLOW UP: Please do your FIT test and please make a lab appointment to recheck your thyroid since Dr. Jin adjusted your medication.    If you have already completed these items, please contact the clinic via phone or   [employer] so your care team can review and update your records. Thank you for   choosing [facility] for your healthcare needs. For any questions,   concerns, or to schedule an appointment please contact our clinic.    Healthy Regards,      Your [facility] Care Team

## 2023-06-21 ENCOUNTER — TELEPHONE (OUTPATIENT)
Dept: FAMILY MEDICINE | Facility: CLINIC | Age: 58
End: 2023-06-21
Payer: COMMERCIAL

## 2023-06-21 DIAGNOSIS — E03.9 HYPOTHYROIDISM, UNSPECIFIED TYPE: ICD-10-CM

## 2023-06-21 NOTE — TELEPHONE ENCOUNTER
"Requested Prescriptions   Pending Prescriptions Disp Refills     levothyroxine (SYNTHROID/LEVOTHROID) 125 MCG tablet 180 tablet 0     Sig: Take 2 tablets (250 mcg) by mouth daily       Thyroid Protocol Failed - 6/21/2023  3:15 PM        Failed - Normal TSH on file in past 12 months     Recent Labs   Lab Test 03/22/23  0723   TSH 0.16*              Passed - Patient is 12 years or older        Passed - Recent (12 mo) or future (30 days) visit within the authorizing provider's specialty     Patient has had an office visit with the authorizing provider or a provider within the authorizing providers department within the previous 12 mos or has a future within next 30 days. See \"Patient Info\" tab in inbasket, or \"Choose Columns\" in Meds & Orders section of the refill encounter.              Passed - Medication is active on med list             "

## 2023-06-21 NOTE — TELEPHONE ENCOUNTER
Pending Prescriptions:                       Disp   Refills    levothyroxine (SYNTHROID/LEVOTHROID) 125 *180 ta*0            Sig: Take 2 tablets (250 mcg) by mouth daily

## 2023-06-21 NOTE — TELEPHONE ENCOUNTER
Patient calling for 2 things:  1.) To  inquire if he needs to be fasting for his TSH scheduled on 06/27, informed he does not need to be.     2.) Micah says  trazodone is not working. He started it in March. He says it hardly does anything. He only tried taking it 3 times. He fell asleep but woke up several to urinate. He normally wakes 1 time per night to urinate. He tried one of his wife's eszopiclone 3 mg tabs about 5 or 6 times with good results. He only needs to take something about 3 times weekly for sleep. If willing to send Rx, [facility] [place] [facility].   I did inform him this may require some type of visit with Dr Jni to discuss. Last office visit: 02/01/23 and sleep concerns were discussed.   Please advise.  Thank you, Ashley BECKFORD RN

## 2023-06-23 NOTE — TELEPHONE ENCOUNTER
Wife has documentation concerning Marco A's sleep issues and would like to share them with the RN on Dr. Jin's care team please.  [phone] Renita Veloz on 6/23/2023 at 3:07 PM

## 2023-06-27 ENCOUNTER — LAB (OUTPATIENT)
Dept: LAB | Facility: CLINIC | Age: 58
End: 2023-06-27
Payer: COMMERCIAL

## 2023-06-27 DIAGNOSIS — E03.9 HYPOTHYROIDISM, UNSPECIFIED TYPE: ICD-10-CM

## 2023-06-27 LAB
T4 FREE SERPL-MCNC: 1.7 NG/DL (ref 0.9–1.7)
TSH SERPL DL<=0.005 MIU/L-ACNC: 0.28 UIU/ML (ref 0.3–4.2)

## 2023-06-27 PROCEDURE — 84443 ASSAY THYROID STIM HORMONE: CPT

## 2023-06-27 PROCEDURE — 36415 COLL VENOUS BLD VENIPUNCTURE: CPT

## 2023-06-27 PROCEDURE — 84439 ASSAY OF FREE THYROXINE: CPT

## 2023-06-27 NOTE — TELEPHONE ENCOUNTER
Left detailed message to call back and help schedule appointment to discuss sleep concerns.    .Sparkle MILLER

## 2023-06-28 DIAGNOSIS — E03.9 HYPOTHYROIDISM, UNSPECIFIED TYPE: Primary | ICD-10-CM

## 2023-06-28 RX ORDER — LEVOTHYROXINE SODIUM 125 UG/1
250 TABLET ORAL DAILY
Qty: 180 TABLET | Refills: 0 | OUTPATIENT
Start: 2023-06-28

## 2023-06-28 RX ORDER — LEVOTHYROXINE SODIUM 100 UG/1
TABLET ORAL
Qty: 90 TABLET | Refills: 3 | Status: SHIPPED | OUTPATIENT
Start: 2023-06-28 | End: 2024-06-24

## 2023-06-28 RX ORDER — LEVOTHYROXINE SODIUM 125 UG/1
TABLET ORAL
Qty: 90 TABLET | Refills: 3 | Status: SHIPPED | OUTPATIENT
Start: 2023-06-28 | End: 2024-06-24

## 2023-06-29 NOTE — TELEPHONE ENCOUNTER
Pt has an appt scheduled with PCP on 7/10/23 for sleep issues. Closing this encounter.    Christie Reese RN  [facility]

## 2023-07-10 ENCOUNTER — VIRTUAL VISIT (OUTPATIENT)
Dept: FAMILY MEDICINE | Facility: CLINIC | Age: 58
End: 2023-07-10
Payer: COMMERCIAL

## 2023-07-10 DIAGNOSIS — G47.00 INSOMNIA, UNSPECIFIED TYPE: Primary | ICD-10-CM

## 2023-07-10 DIAGNOSIS — E03.9 HYPOTHYROIDISM, UNSPECIFIED TYPE: ICD-10-CM

## 2023-07-10 DIAGNOSIS — Z12.11 SCREEN FOR COLON CANCER: ICD-10-CM

## 2023-07-10 PROCEDURE — 99214 OFFICE O/P EST MOD 30 MIN: CPT | Mod: 95 | Performed by: FAMILY MEDICINE

## 2023-07-10 RX ORDER — ESZOPICLONE 1 MG/1
1 TABLET, FILM COATED ORAL
Qty: 30 TABLET | Refills: 0 | Status: SHIPPED | OUTPATIENT
Start: 2023-07-10 | End: 2023-08-28

## 2023-07-10 NOTE — PROGRESS NOTES
Micah is a 57 year old who is being evaluated via a billable telephone visit.      What phone number would you like to be contacted at? [phone]  How would you like to obtain your AVS? Kiesha    Distant Location (provider location):  On-site    Assessment & Plan     Insomnia, unspecified type  Discussed sleep hygiene. Also stated his insomnia is likely related to his caffeine use, and over medicated for his thyroid. Does not tolerate trazodone.   Wishes to use Eszopiclone for his symptoms. He plans to only use this as needed for when having lots of difficulty sleeping. I think his symptoms will improve with thyroid med management and lifestyle changes.   -- Refer to sleep medicine for continue cares regarding insomnia and long term plan for sleep.   - Adult Sleep Eval & Management  Referral  - eszopiclone (LUNESTA) 1 MG tablet  Dispense: 30 tablet; Refill: 0    Hypothyroidism, unspecified type  Recent change to decrease Levothyroxine dose. Needs recheck of thyroid on 8/9    Screen for colon cancer  Due for screening, order placed.   - Colonoscopy Screening  Referral    The risks, benefits and treatment options of prescribed medications or other treatments have been discussed with the patient. The patient verbalized their understanding and should call or follow up if no improvement or if they develop further problems.        Juventino Jin, [facility]    Subjective   Micah is a 57 year old, presenting for the following health issues:  No chief complaint on file.        History of Present Illness       Reason for visit:  Trouble staying asleeep and falling asleep    He eats 2-3 servings of fruits and vegetables daily.He consumes 2 sweetened beverage(s) daily.He exercises with enough effort to increase his heart rate 9 or less minutes per day.  He exercises with enough effort to increase his heart rate 5 days per week.   He is taking medications regularly.        Insomnia  Onset/Duration: 1 year  Description:   Frequency of insomnia:  several times a week  Time to fall asleep (sleep latency): 10-20 minutes  Middle of night awakening:  YES  Early morning awakening:  YES  Progression of Symptoms:  same and intermittent  Accompanying Signs & Symptoms:  Daytime sleepiness/napping: No  Excessive snoring/apnea: No  Restless legs: YES  Waking to urinate: YES- 1 time usually but up to 2-3 times  Chronic pain:  No  Depression symptoms (if yes, do PHQ9): No  Anxiety symptoms (if yes, do VITO-7): No  History:  Prior Insomnia: No  New stressful situation: No  Precipitating factors:   Caffeine intake: 2 cups of coffee a day  3;30 and 5 am and one energy drink a day at 3:30 PM or Monster 80 mg caffeine drink at noon (if he has a monster he does not have the energy drink)  OTC decongestants: No  Any new medications: No  Alleviating factors:  Self medicating (alcohol, etc.):  No  Stress-reduction (exercise, yoga, meditation etc): No  Therapies tried and outcome: caffeine avoidance and unisom (shilo sleep only 5 hours) and melatonin, trazodone (he does not like the trazodone). He would like to try Lunesta (generic), he tried his wife's and worked.       Will have a couple times per week where will wake up and be unable to fall back asleep.       Review of Systems   Constitutional, HEENT, cardiovascular, pulmonary, gi and gu systems are negative, except as otherwise noted.      Objective       Vitals:  No vitals were obtained today due to virtual visit.    Physical Exam   healthy, alert, and no distress  PSYCH: Alert and oriented times 3; coherent speech, normal   rate and volume, able to articulate logical thoughts, able   to abstract reason, no tangential thoughts, no hallucinations   or delusions  His affect is normal  RESP: No cough, no audible wheezing, able to talk in full sentences  Remainder of exam unable to be completed due to telephone visits    Phone call duration: 11  minutes

## 2023-07-10 NOTE — PATIENT INSTRUCTIONS
Sleep Hygiene    1. Avoid doing anything stressful in the hour before bedtime. Avoid paying bills, watching politics on the news, etc.  2. Avoid screens just before bedtime. This includes cell phones, iPad, computers, TV. The bright lights on the screen is very stimulating to the brain, and makes it difficult to follow asleep and stay asleep  3. Avoid alcohol. Alcohol can sometimes make it easier to fall asleep, but significantly reduces the chance that you will stay asleep. It also impairs REM sleep, which is the good restful sleep  4. Use the bed for sleep and sex only. Avoid eating, reading, watching TV in bed  5. If you feel very restless at bedtime, try doing mundane physical activities such as vacuuming, folding laundry, etc.  6. If you find yourself making lists in your head at night, keep pen and paper at the bedside. Write down these lists. Also visualize yourself checking that item off your list and removing it from your brain.  7. Keep the room cool and dark. Consider investing in late darkening curtains  8. Avoid drinking excessive fluids just before bedtime. Empty your bladder just before bedtime  9. Cognitive behavioral therapy (CBT) has been proven to be highly effective to treat insomnia.         Eszopiclone as needed.   Follow up with sleep medicine.

## 2023-08-02 ENCOUNTER — LAB (OUTPATIENT)
Dept: LAB | Facility: CLINIC | Age: 58
End: 2023-08-02
Payer: COMMERCIAL

## 2023-08-02 DIAGNOSIS — E03.9 HYPOTHYROIDISM, UNSPECIFIED TYPE: ICD-10-CM

## 2023-08-02 LAB — TSH SERPL DL<=0.005 MIU/L-ACNC: 0.99 UIU/ML (ref 0.3–4.2)

## 2023-08-02 PROCEDURE — 84443 ASSAY THYROID STIM HORMONE: CPT

## 2023-08-02 PROCEDURE — 36415 COLL VENOUS BLD VENIPUNCTURE: CPT

## 2023-10-25 DIAGNOSIS — G47.00 INSOMNIA, UNSPECIFIED TYPE: ICD-10-CM

## 2023-10-25 NOTE — TELEPHONE ENCOUNTER
Pending Prescriptions:                       Disp   Refills    eszopiclone (LUNESTA) 1 MG tablet         30 tab*0            Sig: Take 1 tablet (1 mg) by mouth nightly as needed           for sleep      Medication Question or Refill    Pt states he typically takes med every other night. Pt currently out.    What medication are you calling about (include dose and sig)?:     eszopiclone (LUNESTA) 1 MG tablet         30 tab*0            Sig: Take 1 tablet (1 mg) by mouth nightly as needed           for sleep    Preferred Pharmacy:     [place] [address] [place] [address]  Phone: [phone] Fax: [phone]      Controlled Substance Agreement on file:   CSA -- Patient Level:    CSA: None found at the patient level.       Who prescribed the medication?: [place]    Do you need a refill? Yes    When did you use the medication last? 10/1    Patient offered an appointment? No    Do you have any questions or concerns?  No      Could we send this information to you in [facility] or would you prefer to receive a phone call?:   Patient would prefer a phone call   Okay to leave a detailed message?: Yes at Cell number on file:    Telephone Information:   Mobile [phone]

## 2023-10-26 RX ORDER — ESZOPICLONE 1 MG/1
1 TABLET, FILM COATED ORAL
Qty: 30 TABLET | Refills: 0 | Status: SHIPPED | OUTPATIENT
Start: 2023-10-26 | End: 2024-10-02

## 2023-12-27 DIAGNOSIS — N32.89 BLADDER SPASMS: ICD-10-CM

## 2023-12-27 RX ORDER — OXYBUTYNIN CHLORIDE 10 MG/1
10 TABLET, EXTENDED RELEASE ORAL DAILY
Qty: 90 TABLET | Refills: 1 | Status: SHIPPED | OUTPATIENT
Start: 2023-12-27 | End: 2024-06-24

## 2024-01-09 ENCOUNTER — TELEPHONE (OUTPATIENT)
Dept: FAMILY MEDICINE | Facility: CLINIC | Age: 59
End: 2024-01-09
Payer: COMMERCIAL

## 2024-01-09 NOTE — TELEPHONE ENCOUNTER
Patient Quality Outreach    Patient is due for the following:   Colon Cancer Screening    Next Steps:   Schedule colonoscopy    Type of outreach:    Sent letter.      Questions for provider review:    None           Jenifer Rose, CMA

## 2024-01-09 NOTE — LETTER
January 9, 2024    To  Micah Lay  [address] [facility] [address]    Your team at [facility] cares about your health. We have reviewed your chart and based on our findings; we are making the following recommendations to better manage your health.     You are in particular need of attention regarding the following:     Colon cancer is now the second leading cause of cancer-related deaths in the [place] for both men and women and there are over 130,000 new cases and 50,000 deaths per year from colon cancer. Colonoscopies can prevent 90-95% of these deaths. Problem lesions can be removed before they ever become cancer. This test is not only looking for cancer, but also getting rid of precancerous lesions.     Please call [phone] to schedule your colonoscopy.    If you have already completed these items, please contact the clinic via phone or   [facility] [employer] so your care team can review and update your records. Thank you for   choosing [facility] for your healthcare needs. For any questions,   concerns, or to schedule an appointment please contact our clinic.    Healthy Regards,      Your [facility] Care Team

## 2024-03-21 ENCOUNTER — HOSPITAL ENCOUNTER (EMERGENCY)
Facility: CLINIC | Age: 59
Discharge: HOME | End: 2024-03-21
Attending: EMERGENCY MEDICINE | Admitting: EMERGENCY MEDICINE
Payer: COMMERCIAL

## 2024-03-21 VITALS
SYSTOLIC BLOOD PRESSURE: 156 MMHG | HEIGHT: 68 IN | OXYGEN SATURATION: 98 % | DIASTOLIC BLOOD PRESSURE: 96 MMHG | RESPIRATION RATE: 16 BRPM | TEMPERATURE: 98.1 F | WEIGHT: 218 LBS | HEART RATE: 70 BPM | BODY MASS INDEX: 33.04 KG/M2

## 2024-03-21 DIAGNOSIS — M54.42 ACUTE LEFT-SIDED LOW BACK PAIN WITH LEFT-SIDED SCIATICA: ICD-10-CM

## 2024-03-21 DIAGNOSIS — M62.830 LUMBAR PARASPINAL MUSCLE SPASM: ICD-10-CM

## 2024-03-21 PROCEDURE — 99284 EMERGENCY DEPT VISIT MOD MDM: CPT | Performed by: EMERGENCY MEDICINE

## 2024-03-21 PROCEDURE — 99284 EMERGENCY DEPT VISIT MOD MDM: CPT | Mod: GC | Performed by: EMERGENCY MEDICINE

## 2024-03-21 RX ORDER — OXYCODONE HYDROCHLORIDE 5 MG/1
5 TABLET ORAL EVERY 6 HOURS PRN
Qty: 12 TABLET | Refills: 0 | Status: SHIPPED | OUTPATIENT
Start: 2024-03-21 | End: 2024-10-02

## 2024-03-21 RX ORDER — METHYLPREDNISOLONE 4 MG
TABLET, DOSE PACK ORAL
Qty: 21 TABLET | Refills: 0 | Status: SHIPPED | OUTPATIENT
Start: 2024-03-21 | End: 2024-10-02

## 2024-03-21 RX ORDER — CYCLOBENZAPRINE HCL 10 MG
10 TABLET ORAL 3 TIMES DAILY PRN
Qty: 30 TABLET | Refills: 0 | Status: SHIPPED | OUTPATIENT
Start: 2024-03-21 | End: 2024-10-02

## 2024-03-21 ASSESSMENT — ENCOUNTER SYMPTOMS
FLANK PAIN: 0
DIZZINESS: 0
HEMATURIA: 0
HEADACHES: 0
DYSURIA: 0
BACK PAIN: 1
FREQUENCY: 0
NECK PAIN: 1
ABDOMINAL PAIN: 0
DIFFICULTY URINATING: 0
WEAKNESS: 0
LIGHT-HEADEDNESS: 0
NAUSEA: 0
DIARRHEA: 0

## 2024-03-21 ASSESSMENT — COLUMBIA-SUICIDE SEVERITY RATING SCALE - C-SSRS
1. IN THE PAST MONTH, HAVE YOU WISHED YOU WERE DEAD OR WISHED YOU COULD GO TO SLEEP AND NOT WAKE UP?: NO
2. HAVE YOU ACTUALLY HAD ANY THOUGHTS OF KILLING YOURSELF IN THE PAST MONTH?: NO
6. HAVE YOU EVER DONE ANYTHING, STARTED TO DO ANYTHING, OR PREPARED TO DO ANYTHING TO END YOUR LIFE?: NO

## 2024-03-21 ASSESSMENT — ACTIVITIES OF DAILY LIVING (ADL)
ADLS_ACUITY_SCORE: 33
ADLS_ACUITY_SCORE: 33

## 2024-03-21 NOTE — ED PROVIDER NOTES
History     Chief Complaint   Patient presents with    Back Pain     HPI  History per patient, review of Psychiatric EMR and Care Everywhere EMR.    Micah Lay is a 58 year old male w/ PMH of insomnia, hypothyroidism, HLD who presents today for one month of LBP and bilateral leg pain, but only left upper leg pain currently.   1-month history of constant bilateral leg pain located just below gluteal fold extending to posterior mid-thigh without further radiation described as aching, worse in the evening with sitting and lying down. Pain is now predominately on the left side. Improved with standing or walking or PO Advil use. He reports being able to do his job but has difficulty sleeping or sitting to eat with his pain. He denies any previous similar pain presentation. He endorses prior history of benign back pain that was improved with chiropractic therapy. He has never tried PT. No pain relief with recent chiropractor therapy  With prior chiropractic therapy, he endorses getting lumbar Xrays every year, last one 2022 at [facility] in [facility], apparently benign x-ray findings.  He works as a low-voltage .   Denies known precipitant of low back pain or associated preceding traumatic event. No loss of b/b control. No numbness/sensation change, or leg weakness. Endorses some long standing neck pain that hasn't changed.  No fever or chills.  No abdominal or flank pain.  No other pertinent history or acute complaints or concerns.    Allergies:  No Known Allergies    Problem List:    Patient Active Problem List    Diagnosis Date Noted    Achilles tendon injury, right, initial encounter 04/01/2021     Priority: Medium     Added automatically from request for surgery [id number]      Non-recurrent unilateral inguinal hernia without obstruction or gangrene 10/18/2018     Priority: Medium    Bladder spasms 09/15/2016     Priority: Medium    Hypothyroidism, unspecified type 05/25/2016     Priority: Medium     HYPERLIPIDEMIA LDL GOAL <130 10/31/2010     Priority: Medium    Family history of colon cancer 08/23/2010     Priority: Medium    Family history of aneurysm 08/23/2010     Priority: Medium    Screening for hypertension 11/03/2008     Priority: Medium        Past Medical History:    Past Medical History:   Diagnosis Date    Hypertension     Other and unspecified hyperlipidemia may 2006    Sprain of lumbar region     Unspecified hypothyroidism        Past Surgical History:    Past Surgical History:   Procedure Laterality Date    LAPAROSCOPIC HERNIORRHAPHY INGUINAL BILATERAL Bilateral 6/12/2020    Procedure: Laparoscopic bilateral inguinal hernia repair with mesh;  Surgeon: Jd Wheatley MD;  Location: [facility] OR    REPAIR TENDON ACHILLES Right 4/6/2021    Procedure: Achilles tendon repair, right;  Surgeon: Uri Baird DPM;  Location: [facility] OR       Family History:    Family History   Problem Relation Age of Onset    Arthritis Mother     Thyroid Disease Mother     Lipids Mother     Cerebrovascular Disease Mother     Hypertension Father     Heart Disease Father     Allergies Son     Neurologic Disorder Son         taking concerta    Connective Tissue Disorder Maternal Grandfather     Hypertension Brother     Obesity Brother     Lipids Brother     Cardiovascular Brother         [email or web address]       Social History:  Marital Status:   [2]  Social History     Tobacco Use    Smoking status: Never    Smokeless tobacco: Never   Vaping Use    Vaping Use: Never used   Substance Use Topics    Alcohol use: Yes     Comment: rare    Drug use: No        Medications:    eszopiclone (LUNESTA) 1 MG tablet  levothyroxine (SYNTHROID/LEVOTHROID) 100 MCG tablet  levothyroxine (SYNTHROID/LEVOTHROID) 125 MCG tablet  Melatonin 10 MG TBCR  multivitamin w/minerals (THERA-VIT-M) tablet  oxyBUTYnin ER (DITROPAN XL) 10 MG 24 hr tablet  pravastatin (PRAVACHOL) 40 MG tablet  traZODone (DESYREL) 100 MG tablet      Review of Systems  "  Gastrointestinal:  Negative for abdominal pain, diarrhea and nausea.   Genitourinary:  Negative for difficulty urinating, dysuria, flank pain, frequency, hematuria and urgency.   Musculoskeletal:  Positive for back pain and neck pain. Negative for gait problem.   Neurological:  Negative for dizziness, weakness, light-headedness and headaches.   All other systems reviewed and are negative.      Physical Exam   BP: (!) 161/105  Pulse: 68  Temp: 98.1  F (36.7  C)  Resp: 20  Height: 172.7 cm (5' 8\")  Weight: 98.9 kg (218 lb)  SpO2: 97 %      Physical Exam  Vitals and nursing note reviewed.   Constitutional:       General: He is not in acute distress.     Appearance: Normal appearance. He is not ill-appearing, toxic-appearing or diaphoretic.   HENT:      Head: Normocephalic and atraumatic.      Mouth/Throat:      Mouth: Mucous membranes are moist.   Eyes:      General: No visual field deficit.  Cardiovascular:      Rate and Rhythm: Normal rate and regular rhythm.      Pulses: Normal pulses.      Heart sounds: Normal heart sounds. No murmur heard.     No friction rub. No gallop.   Pulmonary:      Effort: Pulmonary effort is normal.      Breath sounds: Normal breath sounds. No wheezing, rhonchi or rales.   Abdominal:      Palpations: Abdomen is soft.      Tenderness: There is no abdominal tenderness. There is no right CVA tenderness or left CVA tenderness.      Hernia: A hernia (Chronic stable nontender umbilical hernia) is present.   Musculoskeletal:         General: Tenderness (Left low back pain and paraspinous muscular tenderness as diagrammed, no midline or bony tenderness) present. No swelling. Normal range of motion.      Cervical back: Muscular tenderness present.      Thoracic back: Normal.      Lumbar back: Spasms and tenderness (Left paraspinous tenderness and spasm as diagrammed) present. No deformity, signs of trauma or lacerations. Normal range of motion.        Back:       Right hip: Normal. No bony " tenderness or crepitus. Normal range of motion. Normal strength.      Left hip: Normal. No bony tenderness or crepitus. Normal range of motion. Normal strength.      Left upper leg: No edema or deformity.      Right knee: Normal. No swelling. No tenderness.      Instability Tests: Anterior drawer test negative. Posterior drawer test negative.      Left knee: Normal. No swelling. No tenderness.      Instability Tests: Anterior drawer test negative. Posterior drawer test negative.      Right lower leg: No edema.      Left lower leg: No edema.   Skin:     General: Skin is warm.      Capillary Refill: Capillary refill takes less than 2 seconds.      Coloration: Skin is not jaundiced or pale.      Findings: No bruising, lesion or rash.   Neurological:      General: No focal deficit present.      Mental Status: He is alert and oriented to person, place, and time. Mental status is at baseline.      GCS: GCS eye subscore is 4. GCS verbal subscore is 5. GCS motor subscore is 6.      Cranial Nerves: No cranial nerve deficit, dysarthria or facial asymmetry.      Sensory: Sensation is intact. No sensory deficit.      Motor: Motor function is intact. No weakness, tremor, atrophy or abnormal muscle tone.      Coordination: Coordination is intact. Coordination normal.      Gait: Gait normal.   Psychiatric:         Mood and Affect: Mood normal.         Behavior: Behavior normal. Behavior is cooperative.         Thought Content: Thought content normal.         ED Course        Procedures                No results found for this or any previous visit (from the past 24 hour(s)).    Medications - No data to display    Assessments & Plan (with Medical Decision Making)     I have reviewed the nursing notes.    I have reviewed the findings, diagnosis, plan and need for follow up with the patient.  Pt is a 58 year old male with PMH of benign recurrent low back pain for which he receives intermittent chiropractic care who presents with one  month of atraumatic left low back pain which was radiating into the upper legs bilaterally, not past the knees, and now only present in the left upper leg.  Pain is consistent with benign musculoskeletal pain, exacerbated by movement and changing position and improved with rest and NSAIDs.  Given pain resolution with standing or walking, without traumatic onset, pain extending past his thigh, or red flag symptoms such as sensation changes, lower extremity weakness, or bowel or bladder incontinence, imaging not warranted at this time.  No evidence of cauda equina syndrome or neurologic emergency and I doubt emergent neurologic, neurovascular or infectious disease process.  Differentials include degenerative disc disorder vs herniated disc vs muscular strain with spasm.   Plan for symptomatic management with low-dose oxycodone prn pain refractory to NSAIDs, continuation of OTC NSAID cream and will have him use OTC lidocaine patches and Rx for Medrol Dosepak and Flexeril.  I will make a referral him for physical therapy and refer him to the spine  service for further management options (such as JIMMIE, further desired imaging, etc.).  He was counseled on signs and symptoms of cauda equina syndrome and signs and symptoms that would need emergent reevaluation, and expressed understanding of these.  Osiel Michaels MD      Medical Decision Making  The patient's presentation was of straightforward complexity (a clearly self-limited or minor problem).    The patient's evaluation involved:  review of external note(s) from 1 sources (see separate area of note for details)  strong consideration of a test (Lumbar MRI) that was ultimately deferred    The patient's management necessitated moderate risk (prescription drug management including medications given in the ED).        New Prescriptions    CYCLOBENZAPRINE (FLEXERIL) 10 MG TABLET    Take 1 tablet (10 mg) by mouth 3 times daily as needed    METHYLPREDNISOLONE  (MEDROL DOSEPAK) 4 MG TABLET THERAPY PACK    Follow Package Directions    OXYCODONE (ROXICODONE) 5 MG TABLET    Take 1 tablet (5 mg) by mouth every 6 hours as needed       Final diagnoses:   Acute left-sided low back pain with left-sided sciatica   Lumbar paraspinal muscle spasm     Kaitlynn De Leon, MIRIAMN, RN  DNP Student     3/21/2024   [facility] EMERGENCY DEPT    Medical/ESTEVAN Student  Attestation   I, Osiel Michaels MD, was present with the medical/ESTEVAN student who participated in the service and in the documentation of the note. I have verified the history and personally performed the HPI, ROS, Physical Exam and Medical Decision Making. I have amended and completed this note, including amendment of the HPI, ROS, Physical Exam and Medical Decision Making. I agree with the assessment and plan of care as documented in the note.        Osiel Michaels MD  03/22/24 1405       Osiel Michaels MD  03/22/24 1416

## 2024-03-21 NOTE — ED NOTES
Has ongoing issues with low back pain he normally sees a chiropractor for recently has been having pain that radiates through both buttocks into thighs is worse at night with laying down has more pain on L then R.    Denies known injury has NO risk factors for DVT denies swelling denies discoloration     Ambulates without difficulty     Denies changes in bowel or bladder

## 2024-03-21 NOTE — ED NOTES
Pt also c/o neck pain x 1 month. Able to walk without difficulty but more painful when he sits or lays down.

## 2024-04-13 ENCOUNTER — HEALTH MAINTENANCE LETTER (OUTPATIENT)
Age: 59
End: 2024-04-13

## 2024-04-18 NOTE — ED PROVIDER NOTES
History     Chief Complaint   Patient presents with     Back Pain     R lower back pain, onset after bending over to lace his shoes.  onset x 1 week ago.  denies loss of bladder and bowel. pain does not radiate.  it is worse w movement     HPI  Micah Lay is a 55 year old male who presents to urgent care for evaluation of right-sided low back pain.  Patient states he was bending over to lace his shoes 1 week ago and felt sharp pain in his right lower back.  Pain increases with twisting and bending movements.  He noted spasms in both thighs intermittently. He has had 3 chiropractic visits this week and has been taking Tylenol and ibuprofen.  Pain is still persistent and does not seem like it is improving.  He does heavy lifting at his job. No loss of bowel or bladder control. No LE numbness or weakness. No history of back surgery.    Allergies:  No Known Allergies    Problem List:    Patient Active Problem List    Diagnosis Date Noted     Non-recurrent unilateral inguinal hernia without obstruction or gangrene 10/18/2018     Priority: Medium     Bladder spasms 09/15/2016     Priority: Medium     Hypothyroidism, unspecified type 05/25/2016     Priority: Medium     HYPERLIPIDEMIA LDL GOAL <130 10/31/2010     Priority: Medium     Family history of colon cancer 08/23/2010     Priority: Medium     Family history of aneurysm 08/23/2010     Priority: Medium     Screening for hypertension 11/03/2008     Priority: Medium        Past Medical History:    Past Medical History:   Diagnosis Date     Other and unspecified hyperlipidemia may 2006     Sprain of lumbar region      Unspecified hypothyroidism        Past Surgical History:    Past Surgical History:   Procedure Laterality Date     LAPAROSCOPIC HERNIORRHAPHY INGUINAL BILATERAL Bilateral 6/12/2020    Procedure: Laparoscopic bilateral inguinal hernia repair with mesh;  Surgeon: Jd Wheatley MD;  Location: [facility] OR       Family History:    Family History   Problem  "Relation Age of Onset     Arthritis Mother      Thyroid Disease Mother      Lipids Mother      Hypertension Father      Allergies Son      Neurologic Disorder Son         taking concerta     Connective Tissue Disorder Maternal Grandfather      Hypertension Brother      Obesity Brother      Lipids Brother      Cardiovascular Brother         luan       Social History:  Marital Status:   [2]  Social History     Tobacco Use     Smoking status: Never Smoker     Smokeless tobacco: Never Used   Substance Use Topics     Alcohol use: No     Drug use: No        Medications:         cyclobenzaprine (FLEXERIL) 10 MG tablet       oxyCODONE (ROXICODONE) 5 MG tablet       calcium-magnesium (CALMAG) 500-250 MG TABS per tablet       levothyroxine (SYNTHROID/LEVOTHROID) 150 MCG tablet       Melatonin (MELATONIN TR) 10 MG TBCR       oxybutynin ER (DITROPAN-XL) 10 MG 24 hr tablet       pravastatin (PRAVACHOL) 40 MG tablet       Vitamin D3 (CHOLECALCIFEROL) 25 mcg (1000 units) tablet       vitamin E (TOCOPHEROL) 100 units (90 mg) capsule          Review of Systems   Constitutional: Negative for chills and fever.   HENT: Negative for congestion.    Respiratory: Negative for cough and shortness of breath.    Cardiovascular: Negative.    Gastrointestinal: Negative.    Genitourinary: Negative.    Musculoskeletal: Positive for back pain.   Neurological: Negative.    All other systems reviewed and are negative.      Physical Exam   BP: (!) 157/82  Pulse: 70  Temp: 98  F (36.7  C)  Resp: 18  Height: 172.7 cm (5' 8\")  Weight: 99.8 kg (220 lb)  SpO2: 98 %      Physical Exam  Appearance:  Alert, oriented. NAD.  Resp:  Lung sounds CTA.  CV:  RRR. No murmur.  BACK:  No overlying skin changes or rash. No midline back tenderness.  There is diffuse right sided lumbar paraspinal muscle tenderness to palpation.  Increased pain with bending and twisting movements.  Normal lower extremity strength.  Gait is  steady.  Normal LE sensation.  "     ED Course        Procedures         No results found for this or any previous visit (from the past 24 hour(s)).    Medications - No data to display    Assessments & Plan (with Medical Decision Making)   55-year-old male with 1 week history of right-sided low back pain after he was bending over to tie his shoelaces.  Increased pain with movement and twisting. No improvement after 3 visits to chiropractor and taking Tylenol and ibuprofen. No red flags in his history and exam to warrant emergent imaging of his lumbar spine.  On exam patient is afebrile.  BP mildly elevated 157/82, likely due to pain.  No tachycardia.  Tenderness to the right paraspinal muscles and along the iliac spine on his right side.  No midline spine tenderness.  No overlying skin changes, no erythema, no swelling, and no rash.  Normal lower extremity strength and sensation.  Patient will be treated conservatively with muscle relaxer and pain medication.  Instructed to continue to use NSAIDs for mild/moderate pain.  Plan as follows:    Ice packs alternating with heat.  Flexeril 10 mg three times a day as needed (muscle relaxer)  Ibuprofen 400-600 mg every 6-8 hours as needed for mild/moderate pain. Take with food. Stop if it is causing nausea or abdominal pain.   Tylenol (Acetaminophen) 650 mg every 4-6 hours as needed for mild/moderate pain.  Oxycodone 5 mg every 6 hours if needed for moderate/severe pain. Do not use alcohol, operate machinery, drive, or climb on ladders for 8 hours after taking Oxycodone.   Use docusate (100mg) 2 times a day to prevent constipation while on opioid medication.    Recheck in clinic if you are not improving in 2-3 days.  I have reviewed the nursing notes.    I have reviewed the findings, diagnosis, plan and need for follow up with the patient.      New Prescriptions    CYCLOBENZAPRINE (FLEXERIL) 10 MG TABLET    Take 1 tablet (10 mg) by mouth 3 times daily as needed for muscle spasms    OXYCODONE (ROXICODONE) 5  MG TABLET    Take 1 tablet (5 mg) by mouth every 6 hours as needed for moderate to severe pain       Final diagnoses:   Acute right-sided low back pain without sciatica       11/28/2020   [facility] EMERGENCY DEPT     Shari Condon APRN CNP  11/28/20 1555     Detail Level: Detailed

## 2024-05-02 ENCOUNTER — TRANSFERRED RECORDS (OUTPATIENT)
Dept: HEALTH INFORMATION MANAGEMENT | Facility: CLINIC | Age: 59
End: 2024-05-02
Payer: COMMERCIAL

## 2024-05-06 ENCOUNTER — TELEPHONE (OUTPATIENT)
Dept: FAMILY MEDICINE | Facility: CLINIC | Age: 59
End: 2024-05-06
Payer: COMMERCIAL

## 2024-05-06 NOTE — TELEPHONE ENCOUNTER
Patient Quality Outreach    Patient is due for the following:   Colon Cancer Screening  Physical Preventive Adult Physical    Next Steps:   Schedule a Adult Preventative    Type of outreach:    Sent letter.      Questions for provider review:    None           Jenifer Rose, [facility]

## 2024-05-06 NOTE — LETTER
May 6, 2024    To  Micah Lay  [address] [address] [address]    Your team at [facility] cares about your health. We have reviewed your chart and based on our findings; we are making the following recommendations to better manage your health.     You are in particular need of attention regarding the following:     PREVENTATIVE VISIT: Physical    If you have already completed these items, please contact the clinic via phone or   MyChart so your care team can review and update your records. Thank you for   choosing [facility] for your healthcare needs. For any questions,   concerns, or to schedule an appointment please contact our clinic.    Healthy Regards,      Your [facility] Care Team

## 2024-06-22 DIAGNOSIS — E03.9 HYPOTHYROIDISM, UNSPECIFIED TYPE: ICD-10-CM

## 2024-06-24 RX ORDER — LEVOTHYROXINE SODIUM 125 UG/1
TABLET ORAL
Qty: 90 TABLET | Refills: 0 | Status: SHIPPED | OUTPATIENT
Start: 2024-06-24 | End: 2024-09-27

## 2024-06-24 RX ORDER — LEVOTHYROXINE SODIUM 100 UG/1
TABLET ORAL
Qty: 90 TABLET | Refills: 0 | Status: SHIPPED | OUTPATIENT
Start: 2024-06-24 | End: 2024-09-27

## 2024-08-05 ENCOUNTER — TELEPHONE (OUTPATIENT)
Dept: FAMILY MEDICINE | Facility: CLINIC | Age: 59
End: 2024-08-05
Payer: COMMERCIAL

## 2024-08-05 NOTE — TELEPHONE ENCOUNTER
Patient Quality Outreach    Patient is due for the following:   Colon Cancer Screening  Physical Preventive Adult Physical    Next Steps:   Schedule a Adult Preventative    Type of outreach:    Sent letter.      Questions for provider review:    None           Jenifer Rose, [facility]

## 2024-08-05 NOTE — LETTER
August 5, 2024    To  Micah Lay  [address] [address] [address]    Your team at [facility] cares about your health. We have reviewed your chart and based on our findings; we are making the following recommendations to better manage your health.     You are in particular need of attention regarding the following:     PREVENTATIVE VISIT: Physical    Please call or use your MyChart to schedule your appointment as one is needed to renew your medications.    If you have already completed these items, please contact the clinic via phone or   [employer] so your care team can review and update your records. Thank you for   choosing [facility] for your healthcare needs. For any questions,   concerns, or to schedule an appointment please contact our clinic.    Healthy Regards,      Your [facility] Care Team

## 2024-08-13 NOTE — TELEPHONE ENCOUNTER
BP bord/stable 134/72; cont amlo 5mg QD and lisin 20mg QD   Called and received . Left message informing the patient that the medication was refilled and sent to his pharmacy.

## 2024-09-18 ENCOUNTER — TELEPHONE (OUTPATIENT)
Dept: FAMILY MEDICINE | Facility: CLINIC | Age: 59
End: 2024-09-18
Payer: COMMERCIAL

## 2024-09-18 DIAGNOSIS — N32.89 BLADDER SPASMS: ICD-10-CM

## 2024-09-18 DIAGNOSIS — E03.9 HYPOTHYROIDISM, UNSPECIFIED TYPE: ICD-10-CM

## 2024-09-18 RX ORDER — LEVOTHYROXINE SODIUM 100 UG/1
TABLET ORAL
Qty: 90 TABLET | Refills: 0 | OUTPATIENT
Start: 2024-09-18

## 2024-09-18 RX ORDER — LEVOTHYROXINE SODIUM 125 UG/1
TABLET ORAL
Qty: 90 TABLET | Refills: 0 | OUTPATIENT
Start: 2024-09-18

## 2024-09-18 RX ORDER — OXYBUTYNIN CHLORIDE 10 MG/1
10 TABLET, EXTENDED RELEASE ORAL DAILY
Qty: 90 TABLET | Refills: 0 | OUTPATIENT
Start: 2024-09-18

## 2024-09-18 NOTE — TELEPHONE ENCOUNTER
Requested Prescriptions   Pending Prescriptions Disp Refills    levothyroxine (SYNTHROID/LEVOTHROID) 100 MCG tablet [Pharmacy Med Name: LEVOTHYROXINE 100 MCG TABLET] 90 tablet 0     Sig: TAKE WITH 125 MCG TABLET TO EQUAL 225 MCG DAILY.       Thyroid Protocol Failed - 9/18/2024 12:42 AM        Failed - Recent (12 mo) or future (30 days) visit within the authorizing provider's specialty     The patient must have completed an in-person or virtual visit within the past 12 months or has a future visit scheduled within the next 90 days with the authorizing provider s specialty.  Urgent care and e-visits do not quality as an office visit for this protocol.          Failed - Normal TSH on file in past 12 months     Recent Labs   Lab Test 08/02/23  1504   TSH 0.99              Passed - Patient is 12 years or older        Passed - Medication is active on med list        Passed - Medication indicated for associated diagnosis     Medication is associated with one or more of the following diagnoses:  Hypothyroidism  Thyroid stimulating hormone suppression therapy  Thyroid cancer  Acquired atrophy of thyroid            oxyBUTYnin ER (DITROPAN XL) 10 MG 24 hr tablet [Pharmacy Med Name: OXYBUTYNIN CL ER 10 MG TABLET] 90 tablet 0     Sig: TAKE 1 TABLET (10 MG) BY MOUTH DAILY.       Muscarinic Antagonists (Urinary Incontinence Agents) Failed - 9/18/2024 12:42 AM        Failed - Recent (12 mo) or future (90 days) visit within the authorizing provider's specialty     The patient must have completed an in-person or virtual visit within the past 12 months or has a future visit scheduled within the next 90 days with the authorizing provider s specialty.  Urgent care and e-visits do not quality as an office visit for this protocol.          Failed - Medication indicated for associated diagnosis     Medication is associated with one or more of the following diagnoses:  Bladder pain  Disorder of the GI tract  Hyperhidrosis  Neurogenic  bladder  Neurogenic detrusor overactivity  Overactive Bladder  Urge incontinence  Urgent desire to urinate          Passed - Medication is Oxybutynin and patient is 5 years of age or older        Passed - Patient does not have a diagnosis of glaucoma on the problem list     If glaucoma diagnosis is new, refer refill to physician.          Passed - Medication is active on med list        Passed - Patient is 18 years of age or older          levothyroxine (SYNTHROID/LEVOTHROID) 125 MCG tablet [Pharmacy Med Name: LEVOTHYROXINE 125 MCG TABLET] 90 tablet 0     Sig: TAKE WITH 100 MCG TABLET TO EQUAL 225 MCG DAILY.       Thyroid Protocol Failed - 9/18/2024 12:42 AM        Failed - Recent (12 mo) or future (30 days) visit within the authorizing provider's specialty     The patient must have completed an in-person or virtual visit within the past 12 months or has a future visit scheduled within the next 90 days with the authorizing provider s specialty.  Urgent care and e-visits do not quality as an office visit for this protocol.          Failed - Normal TSH on file in past 12 months     Recent Labs   Lab Test 08/02/23  1504   TSH 0.99              Passed - Patient is 12 years or older        Passed - Medication is active on med list        Passed - Medication indicated for associated diagnosis     Medication is associated with one or more of the following diagnoses:  Hypothyroidism  Thyroid stimulating hormone suppression therapy  Thyroid cancer  Acquired atrophy of thyroid

## 2024-09-18 NOTE — TELEPHONE ENCOUNTER
Prescriptions declined. Sima refill already  provided 3 months ago.     Needs office visit for future refills.

## 2024-09-25 NOTE — TELEPHONE ENCOUNTER
Pt called requesting consideration of refill on medications to get to scheduled 10/2 appt with another provider due to PCP booked out farther. Please advise.    Genna Klein on 9/25/2024 at 3:31 PM

## 2024-09-27 RX ORDER — LEVOTHYROXINE SODIUM 100 UG/1
TABLET ORAL
Qty: 30 TABLET | Refills: 0 | Status: SHIPPED | OUTPATIENT
Start: 2024-09-27 | End: 2024-10-04

## 2024-09-27 RX ORDER — OXYBUTYNIN CHLORIDE 10 MG/1
10 TABLET, EXTENDED RELEASE ORAL DAILY
Qty: 30 TABLET | Refills: 0 | Status: SHIPPED | OUTPATIENT
Start: 2024-09-27 | End: 2024-10-02

## 2024-09-27 RX ORDER — LEVOTHYROXINE SODIUM 125 UG/1
TABLET ORAL
Qty: 30 TABLET | Refills: 0 | Status: SHIPPED | OUTPATIENT
Start: 2024-09-27 | End: 2024-10-04

## 2024-09-27 NOTE — TELEPHONE ENCOUNTER
Patient returned call. Relayed message from Dr. Jin. Patient in good understanding.    Ladan Burns on 9/27/2024 at 8:50 AM

## 2024-09-30 RX ORDER — ACETAMINOPHEN 325 MG/1
650 TABLET ORAL EVERY 4 HOURS PRN
COMMUNITY
Start: 2022-08-28 | End: 2024-10-02

## 2024-09-30 RX ORDER — IBUPROFEN 600 MG/1
600 TABLET, FILM COATED ORAL EVERY 6 HOURS PRN
COMMUNITY
Start: 2022-08-28

## 2024-10-02 ENCOUNTER — OFFICE VISIT (OUTPATIENT)
Dept: FAMILY MEDICINE | Facility: CLINIC | Age: 59
End: 2024-10-02
Payer: COMMERCIAL

## 2024-10-02 ENCOUNTER — ORDERS ONLY (AUTO-RELEASED) (OUTPATIENT)
Dept: FAMILY MEDICINE | Facility: CLINIC | Age: 59
End: 2024-10-02

## 2024-10-02 VITALS
WEIGHT: 211.2 LBS | DIASTOLIC BLOOD PRESSURE: 78 MMHG | HEART RATE: 61 BPM | BODY MASS INDEX: 32.01 KG/M2 | SYSTOLIC BLOOD PRESSURE: 132 MMHG | HEIGHT: 68 IN | TEMPERATURE: 97.6 F | OXYGEN SATURATION: 97 %

## 2024-10-02 DIAGNOSIS — N32.89 BLADDER SPASMS: ICD-10-CM

## 2024-10-02 DIAGNOSIS — E03.9 HYPOTHYROIDISM, UNSPECIFIED TYPE: ICD-10-CM

## 2024-10-02 DIAGNOSIS — M54.2 NECK PAIN: ICD-10-CM

## 2024-10-02 DIAGNOSIS — Z12.11 SCREEN FOR COLON CANCER: ICD-10-CM

## 2024-10-02 DIAGNOSIS — B35.3 TINEA PEDIS OF BOTH FEET: ICD-10-CM

## 2024-10-02 DIAGNOSIS — Z00.00 ROUTINE GENERAL MEDICAL EXAMINATION AT A HEALTH CARE FACILITY: Primary | ICD-10-CM

## 2024-10-02 DIAGNOSIS — E78.5 HYPERLIPIDEMIA LDL GOAL <130: ICD-10-CM

## 2024-10-02 LAB
ANION GAP SERPL CALCULATED.3IONS-SCNC: 11 MMOL/L (ref 7–15)
BUN SERPL-MCNC: 21.7 MG/DL (ref 6–20)
CALCIUM SERPL-MCNC: 9.5 MG/DL (ref 8.8–10.4)
CHLORIDE SERPL-SCNC: 103 MMOL/L (ref 98–107)
CHOLEST SERPL-MCNC: 299 MG/DL
CREAT SERPL-MCNC: 0.94 MG/DL (ref 0.67–1.17)
EGFRCR SERPLBLD CKD-EPI 2021: >90 ML/MIN/1.73M2
FASTING STATUS PATIENT QL REPORTED: NO
FASTING STATUS PATIENT QL REPORTED: NO
GLUCOSE SERPL-MCNC: 89 MG/DL (ref 70–99)
HCO3 SERPL-SCNC: 25 MMOL/L (ref 22–29)
HDLC SERPL-MCNC: 44 MG/DL
LDLC SERPL CALC-MCNC: 222 MG/DL
NONHDLC SERPL-MCNC: 255 MG/DL
POTASSIUM SERPL-SCNC: 4 MMOL/L (ref 3.4–5.3)
SODIUM SERPL-SCNC: 139 MMOL/L (ref 135–145)
TRIGL SERPL-MCNC: 166 MG/DL
TSH SERPL DL<=0.005 MIU/L-ACNC: 0.76 UIU/ML (ref 0.3–4.2)

## 2024-10-02 PROCEDURE — 36415 COLL VENOUS BLD VENIPUNCTURE: CPT

## 2024-10-02 PROCEDURE — 99214 OFFICE O/P EST MOD 30 MIN: CPT | Mod: 25

## 2024-10-02 PROCEDURE — 80048 BASIC METABOLIC PNL TOTAL CA: CPT

## 2024-10-02 PROCEDURE — 99396 PREV VISIT EST AGE 40-64: CPT

## 2024-10-02 PROCEDURE — 84443 ASSAY THYROID STIM HORMONE: CPT

## 2024-10-02 PROCEDURE — 80061 LIPID PANEL: CPT

## 2024-10-02 RX ORDER — OXYBUTYNIN CHLORIDE 10 MG/1
10 TABLET, EXTENDED RELEASE ORAL DAILY
Qty: 90 TABLET | Refills: 3 | Status: SHIPPED | OUTPATIENT
Start: 2024-10-02

## 2024-10-02 RX ORDER — LEVOTHYROXINE SODIUM 100 UG/1
TABLET ORAL
Qty: 30 TABLET | Refills: 0 | Status: CANCELLED | OUTPATIENT
Start: 2024-10-02

## 2024-10-02 RX ORDER — LEVOTHYROXINE SODIUM 125 UG/1
TABLET ORAL
Qty: 30 TABLET | Refills: 0 | Status: CANCELLED | OUTPATIENT
Start: 2024-10-02

## 2024-10-02 SDOH — HEALTH STABILITY: PHYSICAL HEALTH: ON AVERAGE, HOW MANY MINUTES DO YOU ENGAGE IN EXERCISE AT THIS LEVEL?: 100 MIN

## 2024-10-02 SDOH — HEALTH STABILITY: PHYSICAL HEALTH: ON AVERAGE, HOW MANY DAYS PER WEEK DO YOU ENGAGE IN MODERATE TO STRENUOUS EXERCISE (LIKE A BRISK WALK)?: 5 DAYS

## 2024-10-02 ASSESSMENT — SOCIAL DETERMINANTS OF HEALTH (SDOH): HOW OFTEN DO YOU GET TOGETHER WITH FRIENDS OR RELATIVES?: ONCE A WEEK

## 2024-10-02 ASSESSMENT — PATIENT HEALTH QUESTIONNAIRE - PHQ9
SUM OF ALL RESPONSES TO PHQ QUESTIONS 1-9: 0
5. POOR APPETITE OR OVEREATING: NOT AT ALL

## 2024-10-02 ASSESSMENT — ANXIETY QUESTIONNAIRES
1. FEELING NERVOUS, ANXIOUS, OR ON EDGE: NOT AT ALL
7. FEELING AFRAID AS IF SOMETHING AWFUL MIGHT HAPPEN: NOT AT ALL
GAD7 TOTAL SCORE: 0
2. NOT BEING ABLE TO STOP OR CONTROL WORRYING: NOT AT ALL
GAD7 TOTAL SCORE: 0
IF YOU CHECKED OFF ANY PROBLEMS ON THIS QUESTIONNAIRE, HOW DIFFICULT HAVE THESE PROBLEMS MADE IT FOR YOU TO DO YOUR WORK, TAKE CARE OF THINGS AT HOME, OR GET ALONG WITH OTHER PEOPLE: NOT DIFFICULT AT ALL
5. BEING SO RESTLESS THAT IT IS HARD TO SIT STILL: NOT AT ALL
6. BECOMING EASILY ANNOYED OR IRRITABLE: NOT AT ALL
3. WORRYING TOO MUCH ABOUT DIFFERENT THINGS: NOT AT ALL

## 2024-10-02 ASSESSMENT — PAIN SCALES - GENERAL: PAINLEVEL: MODERATE PAIN (4)

## 2024-10-02 NOTE — LETTER
October 9, 2024      Micah Lay  [id number] [address] [address]        Marc Oa,  Your TSH looks great, I will refill your synthroid for 125 mcg and 100 mcg. Blood urea nitrogen was just slightly elevated but similar to a year ago. We will continue to monitor it in the future.  Your cholesterol is elevated. Your 10 year ASCVD risk is 12.6%. I would recommend restarting a statin. I know you were on one previously. If you aren't ready to restart a statin I could order a coronary calcium exam which examines the coronary arteries for build-up and comes with recommendations based on the exam score. I see you do have family history of cardiovascular disease and stroke, which suggests to me that you would benefit from a statin for primary prevention.    The 10-year ASCVD risk score (Daniel JUDD, et al., 2019) is: 12.6%    Values used to calculate the score:      Age: 59 years      Sex: Male      Is Non- : No      Diabetic: No      Tobacco smoker: No      Systolic Blood Pressure: 132 mmHg      Is BP treated: No      HDL Cholesterol: 44 mg/dL      Total Cholesterol: 299 mg/dL    Resulted Orders   Lipid panel reflex to direct LDL Non-fasting   Result Value Ref Range    Cholesterol 299 (H) <200 mg/dL    Triglycerides 166 (H) <150 mg/dL    Direct Measure HDL 44 >=40 mg/dL    LDL Cholesterol Calculated 222 (H) <100 mg/dL    Non HDL Cholesterol 255 (H) <130 mg/dL    Patient Fasting > 8hrs? No     Narrative    Cholesterol  Desirable: < 200 mg/dL  Borderline High: 200 - 239 mg/dL  High: >= 240 mg/dL    Triglycerides  Normal: < 150 mg/dL  Borderline High: 150 - 199 mg/dL  High: 200-499 mg/dL  Very High: >= 500 mg/dL    Direct Measure HDL  Female: >= 50 mg/dL   Male: >= 40 mg/dL    LDL Cholesterol  Desirable: < 100 mg/dL  Above Desirable: 100 - 129 mg/dL   Borderline High: 130 - 159 mg/dL   High:  160 - 189 mg/dL   Very High: >= 190 mg/dL    Non HDL Cholesterol  Desirable: < 130 mg/dL  Above  Desirable: 130 - 159 mg/dL  Borderline High: 160 - 189 mg/dL  High: 190 - 219 mg/dL  Very High: >= 220 mg/dL   TSH WITH FREE T4 REFLEX   Result Value Ref Range    TSH 0.76 0.30 - 4.20 uIU/mL   Basic metabolic panel  (Ca, Cl, CO2, Creat, Gluc, K, Na, BUN)   Result Value Ref Range    Sodium 139 135 - 145 mmol/L    Potassium 4.0 3.4 - 5.3 mmol/L    Chloride 103 98 - 107 mmol/L    Carbon Dioxide (CO2) 25 22 - 29 mmol/L    Anion Gap 11 7 - 15 mmol/L    Urea Nitrogen 21.7 (H) 6.0 - 20.0 mg/dL    Creatinine 0.94 0.67 - 1.17 mg/dL    GFR Estimate >90 >60 mL/min/1.73m2      Comment:      eGFR calculated using 2021 CKD-EPI equation.    Calcium 9.5 8.8 - 10.4 mg/dL      Comment:      Reference intervals for this test were updated on 7/16/2024 to reflect our healthy population more accurately. There may be differences in the flagging of prior results with similar values performed with this method. Those prior results can be interpreted in the context of the updated reference intervals.    Glucose 89 70 - 99 mg/dL    Patient Fasting > 8hrs? No        If you have any questions or concerns, please call the clinic at the number listed above.   Take care,  FLORENCE Ingram, CNP

## 2024-10-02 NOTE — PROGRESS NOTES
Preventive Care Visit  [facility]  Oct 2, 2024      Assessment & Plan     Routine general medical examination at a health care facility    - Basic metabolic panel  (Ca, Cl, CO2, Creat, Gluc, K, Na, BUN); Future  - Basic metabolic panel  (Ca, Cl, CO2, Creat, Gluc, K, Na, BUN)    Hypothyroidism, unspecified type  Reports feeling well. Long standing hypothyroidism. Will reorder levothyroxine based on today's TSH.  - TSH WITH FREE T4 REFLEX; Future  - TSH WITH FREE T4 REFLEX    Bladder spasms  Stable with oxybutynin.  - oxyBUTYnin ER (DITROPAN XL) 10 MG 24 hr tablet; Take 1 tablet (10 mg) by mouth daily.    Neck pain  More notable over the last few months. Had been referred to PT, but did not complete. No images on file. Will obtain x-rays and refer for PT.   - Physical Therapy  Referral; Future  - XR Cervical Spine 2/3 Views; Future    Hyperlipidemia LDL goal <130  Previously on a statin, however, no longer taking.  Recommend restarting statin given ASCVD risk today, as well as family history of coronary artery disease and cerebral vascular events.  The 10-year ASCVD risk score (Daniel JUDD, et al., 2019) is: 12.6%    Values used to calculate the score:      Age: 59 years      Sex: Male      Is Non- : No      Diabetic: No      Tobacco smoker: No      Systolic Blood Pressure: 132 mmHg      Is BP treated: No      HDL Cholesterol: 44 mg/dL      Total Cholesterol: 299 mg/dL  - Lipid panel reflex to direct LDL Non-fasting; Future  - Lipid panel reflex to direct LDL Non-fasting    Tinea pedis of both feet  Not present upon assessment, reports tinea is recurrent. Amenable to OTC preparations, however returns quickly, within days. Plan for more prolonged treatment to eliminate tinea completely.   - miconazole (MICATIN) 2 % external powder; Apply topically 2 times daily for 28 days.    Screen for colon cancer  - COLOGKORI(EXACT  "SCIENCES); Future    BMI  Estimated body mass index is 32.11 kg/m  as calculated from the following:    Height as of this encounter: 1.727 m (5' 8\").    Weight as of this encounter: 95.8 kg (211 lb 3.2 oz).   Weight management plan: Discussed healthy diet and exercise guidelines    Counseling  Appropriate preventive services were addressed with this patient via screening, questionnaire, or discussion as appropriate for fall prevention, nutrition, physical activity, Tobacco-use cessation, social engagement, weight loss and cognition.  Checklist reviewing preventive services available has been given to the patient.  Reviewed patient's diet, addressing concerns and/or questions.       Subjective   Micah is a 59 year old, presenting for the following:  Physical and Musculoskeletal Problem (Neck pain. Diagnosed with arthritis. Is painful. Did not go to physical therapy as prescribed.)        10/2/2024     4:51 PM   Additional Questions   Roomed by [facility] Care Directive  Patient does not have a Health Care Directive or Living Will: Discussed advance care planning with patient; information given to patient to review.    HPI  Notes persistent/recurrent tinea pedis.     Hypothyroidism Follow-up    Since last visit, patient describes the following symptoms: Weight stable, no hair loss, no skin changes, no constipation, no loose stools    Pain History:  When did you first notice your pain? 6 months   Have you seen this provider for your pain in the past? No   Where in your body do your have pain? Neck  Are you seeing anyone else for your pain? No  What makes your pain better? Ibuprofen, heat, ice  What makes your pain worse? none  How has pain affected your ability to work? Pain does not limit ability to work   What type of work do you or did you do?   Who lives in your household? Wife and oldest son, grandson          10/2/2024     4:59 PM   PHQ-9 SCORE   PHQ-9 Total Score 0           " 10/2/2024     4:59 PM   VITO-7 SCORE   Total Score 0           10/2/2024     4:59 PM   PEG Score   PEG Total Score 4               10/2/2024   General Health   How would you rate your overall physical health? Excellent   Feel stress (tense, anxious, or unable to sleep) To some extent      (!) STRESS CONCERN      10/2/2024   Nutrition   Three or more servings of calcium each day? Yes   Diet: Regular (no restrictions)   How many servings of fruit and vegetables per day? (!) 2-3   How many sweetened beverages each day? (!) 2            10/2/2024   Exercise   Days per week of moderate/strenous exercise 5 days   Average minutes spent exercising at this level 100 min            10/2/2024   Social Factors   Frequency of gathering with friends or relatives Once a week   Worry food won't last until get money to buy more No   Food not last or not have enough money for food? No   Do you have housing? (Housing is defined as stable permanent housing and does not include staying ouside in a car, in a tent, in an abandoned building, in an overnight shelter, or couch-surfing.) Yes   Are you worried about losing your housing? No   Lack of transportation? No   Unable to get utilities (heat,electricity)? No            10/2/2024   Fall Risk   Fallen 2 or more times in the past year? No   Trouble with walking or balance? No             10/2/2024   Dental   Dentist two times every year? Yes            10/2/2024   TB Screening   Were you born outside of the [place]? No        Today's PHQ-2 Score:       10/2/2024     4:43 PM   PHQ-2 ( 1999 [employer])   Q1: Little interest or pleasure in doing things 0   Q2: Feeling down, depressed or hopeless 0   PHQ-2 Score 0   Q1: Little interest or pleasure in doing things Not at all   Q2: Feeling down, depressed or hopeless Not at all   PHQ-2 Score 0           10/2/2024   Substance Use   Alcohol more than 3/day or more than 7/wk No   Do you use any other substances recreationally? No        Social History  "    Tobacco Use    Smoking status: Never    Smokeless tobacco: Never   Vaping Use    Vaping status: Never Used   Substance Use Topics    Alcohol use: Yes     Comment: rare    Drug use: No           10/2/2024   STI Screening   New sexual partner(s) since last STI/HIV test? No      Last PSA:   PSA   Date Value Ref Range Status   01/06/2020 1.28 0 - 4 ug/L Final     Comment:     Assay Method:  Chemiluminescence using Siemens Vista analyzer     Prostate Specific Antigen Screen   Date Value Ref Range Status   02/01/2023 1.29 0.00 - 3.50 ng/mL Final     ASCVD Risk   The 10-year ASCVD risk score (Daniel JUDD, et al., 2019) is: 9.6%    Values used to calculate the score:      Age: 59 years      Sex: Male      Is Non- : No      Diabetic: No      Tobacco smoker: No      Systolic Blood Pressure: 132 mmHg      Is BP treated: No      HDL Cholesterol: 50 mg/dL      Total Cholesterol: 251 mg/dL         Reviewed and updated as needed this visit by Provider     Meds                 Objective    Exam  /78   Pulse 61   Temp 97.6  F (36.4  C) (Tympanic)   Ht 1.727 m (5' 8\")   Wt 95.8 kg (211 lb 3.2 oz)   SpO2 97%   BMI 32.11 kg/m     Estimated body mass index is 32.11 kg/m  as calculated from the following:    Height as of this encounter: 1.727 m (5' 8\").    Weight as of this encounter: 95.8 kg (211 lb 3.2 oz).    Physical Exam  GENERAL: alert and no distress  EYES: Eyes grossly normal to inspection, PERRL and conjunctivae and sclerae normal  HENT: ear canals and TM's normal, nose and mouth without ulcers or lesions  NECK: no adenopathy, no asymmetry, masses, or scars  RESP: lungs clear to auscultation - no rales, rhonchi or wheezes  CV: regular rate and rhythm, normal S1 S2, no S3 or S4, no murmur, click or rub, no peripheral edema  ABDOMEN: soft, nontender, no hepatosplenomegaly, no masses and bowel sounds normal  MS: no gross musculoskeletal defects noted, no edema  SKIN: no suspicious " lesions or rashes  NEURO: Normal strength and tone, mentation intact and speech normal  PSYCH: mentation appears normal, affect normal/bright        Signed Electronically by: FLORENCE Ingram, CNP

## 2024-10-02 NOTE — PATIENT INSTRUCTIONS
Labs today.  Schedule cervical spine x-ray.  Follow up with physical therapy for neck.  I will prescribe synthroid based on TSH results Friday.   Treat athletes foot x 4 weeks.     Patient Education   Preventive Care Advice   This is general advice given by our system to help you stay healthy. However, your care team may have specific advice just for you. Please talk to your care team about your preventive care needs.  Nutrition  Eat 5 or more servings of fruits and vegetables each day.  Try wheat bread, brown rice and whole grain pasta (instead of white bread, rice, and pasta).  Get enough calcium and vitamin D. Check the label on foods and aim for 100% of the RDA (recommended daily allowance).  Lifestyle  Exercise at least 150 minutes each week  (30 minutes a day, 5 days a week).  Do muscle strengthening activities 2 days a week. These help control your weight and prevent disease.  No smoking.  Wear sunscreen to prevent skin cancer.  Have a dental exam and cleaning every 6 months.  Yearly exams  See your health care team every year to talk about:  Any changes in your health.  Any medicines your care team has prescribed.  Preventive care, family planning, and ways to prevent chronic diseases.  Shots (vaccines)   HPV shots (up to age 26), if you've never had them before.  Hepatitis B shots (up to age 59), if you've never had them before.  COVID-19 shot: Get this shot when it's due.  Flu shot: Get a flu shot every year.  Tetanus shot: Get a tetanus shot every 10 years.  Pneumococcal, hepatitis A, and RSV shots: Ask your care team if you need these based on your risk.  Shingles shot (for age 50 and up)  General health tests  Diabetes screening:  Starting at age 35, Get screened for diabetes at least every 3 years.  If you are younger than age 35, ask your care team if you should be screened for diabetes.  Cholesterol test: At age 39, start having a cholesterol test every 5 years, or more often if advised.  Bone  density scan (DEXA): At age 50, ask your care team if you should have this scan for osteoporosis (brittle bones).  Hepatitis C: Get tested at least once in your life.  STIs (sexually transmitted infections)  Before age 24: Ask your care team if you should be screened for STIs.  After age 24: Get screened for STIs if you're at risk. You are at risk for STIs (including HIV) if:  You are sexually active with more than one person.  You don't use condoms every time.  You or a partner was diagnosed with a sexually transmitted infection.  If you are at risk for HIV, ask about PrEP medicine to prevent HIV.  Get tested for HIV at least once in your life, whether you are at risk for HIV or not.  Cancer screening tests  Cervical cancer screening: If you have a cervix, begin getting regular cervical cancer screening tests starting at age 21.  Breast cancer scan (mammogram): If you've ever had breasts, begin having regular mammograms starting at age 40. This is a scan to check for breast cancer.  Colon cancer screening: It is important to start screening for colon cancer at age 45.  Have a colonoscopy test every 10 years (or more often if you're at risk) Or, ask your provider about stool tests like a FIT test every year or Cologuard test every 3 years.  To learn more about your testing options, visit:   .  For help making a decision, visit:   https://bit.ly/rz03637.  Prostate cancer screening test: If you have a prostate, ask your care team if a prostate cancer screening test (PSA) at age 55 is right for you.  Lung cancer screening: If you are a current or former smoker ages 50 to 80, ask your care team if ongoing lung cancer screenings are right for you.  For informational purposes only. Not to replace the advice of your health care provider. Copyright   2023 [employer]. All rights reserved. Clinically reviewed by the [facility] Transitions Program. [employer] 670588 - REV 01/24.

## 2024-10-02 NOTE — LETTER
October 21, 2024      Micah Lay  [address] [facility] [address]        Dear ,    Nice to meet you Wednesday, Micah. Your TSH looks great, I will refill your synthroid for 125 mcg and 100 mcg. Blood urea nitrogen was just slightly elevated but similar to a year ago. We will continue to monitor it in the future.  Your cholesterol is elevated. Your 10 year ASCVD risk is 12.6%. I would recommend restarting a statin. I know you were on one previously. If you aren't ready to restart a statin I could order a coronary calcium exam which examines the coronary arteries for build-up and comes with recommendations based on the exam score. I see you do have family history of cardiovascular disease and stroke, which suggests to me that you would benefit from a statin for primary prevention.    Resulted Orders   Lipid panel reflex to direct LDL Non-fasting   Result Value Ref Range    Cholesterol 299 (H) <200 mg/dL    Triglycerides 166 (H) <150 mg/dL    Direct Measure HDL 44 >=40 mg/dL    LDL Cholesterol Calculated 222 (H) <100 mg/dL    Non HDL Cholesterol 255 (H) <130 mg/dL    Patient Fasting > 8hrs? No     Narrative    Cholesterol  Desirable: < 200 mg/dL  Borderline High: 200 - 239 mg/dL  High: >= 240 mg/dL    Triglycerides  Normal: < 150 mg/dL  Borderline High: 150 - 199 mg/dL  High: 200-499 mg/dL  Very High: >= 500 mg/dL    Direct Measure HDL  Female: >= 50 mg/dL   Male: >= 40 mg/dL    LDL Cholesterol  Desirable: < 100 mg/dL  Above Desirable: 100 - 129 mg/dL   Borderline High: 130 - 159 mg/dL   High:  160 - 189 mg/dL   Very High: >= 190 mg/dL    Non HDL Cholesterol  Desirable: < 130 mg/dL  Above Desirable: 130 - 159 mg/dL  Borderline High: 160 - 189 mg/dL  High: 190 - 219 mg/dL  Very High: >= 220 mg/dL   TSH WITH FREE T4 REFLEX   Result Value Ref Range    TSH 0.76 0.30 - 4.20 uIU/mL   Basic metabolic panel  (Ca, Cl, CO2, Creat, Gluc, K, Na, BUN)   Result Value Ref Range    Sodium 139 135 - 145 mmol/L     Potassium 4.0 3.4 - 5.3 mmol/L    Chloride 103 98 - 107 mmol/L    Carbon Dioxide (CO2) 25 22 - 29 mmol/L    Anion Gap 11 7 - 15 mmol/L    Urea Nitrogen 21.7 (H) 6.0 - 20.0 mg/dL    Creatinine 0.94 0.67 - 1.17 mg/dL    GFR Estimate >90 >60 mL/min/1.73m2      Comment:      eGFR calculated using 2021 CKD-EPI equation.    Calcium 9.5 8.8 - 10.4 mg/dL      Comment:      Reference intervals for this test were updated on 7/16/2024 to reflect our healthy population more accurately. There may be differences in the flagging of prior results with similar values performed with this method. Those prior results can be interpreted in the context of the updated reference intervals.    Glucose 89 70 - 99 mg/dL    Patient Fasting > 8hrs? No        If you have any questions or concerns, please call the clinic at the number listed above.       Sincerely,      FLORENCE Ingram CNP

## 2024-10-04 RX ORDER — LEVOTHYROXINE SODIUM 100 UG/1
TABLET ORAL
Qty: 90 TABLET | Refills: 3 | Status: SHIPPED | OUTPATIENT
Start: 2024-10-04

## 2024-10-04 RX ORDER — LEVOTHYROXINE SODIUM 125 UG/1
TABLET ORAL
Qty: 90 TABLET | Refills: 3 | Status: SHIPPED | OUTPATIENT
Start: 2024-10-04

## 2024-10-11 ENCOUNTER — ALLIED HEALTH/NURSE VISIT (OUTPATIENT)
Dept: FAMILY MEDICINE | Facility: CLINIC | Age: 59
End: 2024-10-11
Payer: COMMERCIAL

## 2024-10-11 DIAGNOSIS — Z23 ENCOUNTER FOR IMMUNIZATION: Primary | ICD-10-CM

## 2024-10-11 PROCEDURE — 90472 IMMUNIZATION ADMIN EACH ADD: CPT

## 2024-10-11 PROCEDURE — 90750 HZV VACC RECOMBINANT IM: CPT

## 2024-10-11 PROCEDURE — 90715 TDAP VACCINE 7 YRS/> IM: CPT

## 2024-10-11 PROCEDURE — 90471 IMMUNIZATION ADMIN: CPT

## 2024-10-11 PROCEDURE — 90673 RIV3 VACCINE NO PRESERV IM: CPT

## 2024-10-11 NOTE — PROGRESS NOTES
Prior to immunization administration, verified patients identity using patient s name and date of birth. Please see Immunization Activity for additional information.     Is the patient's temperature normal (100.5 or less)? Yes     Patient MEETS CRITERIA. PROCEED with vaccine administration.        10/11/2024   General Questionnaire    Do you have any questions for your care team about the vaccines you will be receiving today? no                10/11/2024   INFLUENZA   Would you like to receive the flu shot or the nasal flu vaccine today? Flu Shot   Have you had a serious reaction to a flu vaccine or something in a flu vaccine? No   Have you had Guillain-[place] syndrome within 6 weeks of getting a vaccine? No   Have you received a bone marrow transplant within the previous 6 months? No            Patient MEETS CRITERIA. PROCEED with vaccine administration.            10/11/2024   TD/TDAP   Have you had a serious reaction to a Td or Tdap vaccine or to something in these vaccines? No   Have you had coma, fainting, or seizures (encephalopathy) within 1 week of getting a DTP, DTaP, or Tdap vaccine? No   Have you had a serious reaction to thimerosal? No   Have you had a reaction (swelling, bleeding, gangrene) to a tetanus, diphtheria, or meningococcal vaccine? No   Have you had Guillain-[place] syndrome within 6 weeks of getting a vaccine? No   Do you have seizures that aren't controlled, encephalopathy that's getting worse, or a brain disorder that's unstable or getting worse? No   Do you have an allergy to latex? No   Have you had a puncture wound, bite wound, wound with something in it, or dirty wound in the past 3 weeks? No            Patient MEETS CRITERIA. PROCEED with vaccine administration.     TDAP PREFERRED. TD acceptable if requested by the patient.          10/11/2024   Zoster   Have you had a serious reaction to the shingles vaccine or something in the shingles vaccine? No   Do you have shingles now? No   Are  you getting treatment for cancer, organ transplant, or bone marrow transplant? No   Do you have an autoimmune or inflammatory condition? No   Is the patient immunocompromised and wanting to complete the Shingles vaccine series in less than 2 months? No   Have you had Guillain-[place] syndrome within 6 weeks of getting a vaccine? No            Patient MEETS CRITERIA. PROCEED with vaccine administration.      Patient instructed to remain in clinic for 15 minutes afterwards, and to report any adverse reactions.      Link to Ancillary Visit Immunization Standing Orders SmartSet     Screening performed by Nettie Manning CMA on 10/11/2024 at 3:49 PM.

## 2024-11-11 NOTE — TELEPHONE ENCOUNTER
Mucinex    Patient left message that we refilled medication for a few more weeks until her can get out patient labs completed.  Orders are in the computer. Ladan KIRK RN

## 2024-12-11 ENCOUNTER — ANCILLARY PROCEDURE (OUTPATIENT)
Dept: GENERAL RADIOLOGY | Facility: CLINIC | Age: 59
End: 2024-12-11
Payer: COMMERCIAL

## 2024-12-11 ENCOUNTER — ALLIED HEALTH/NURSE VISIT (OUTPATIENT)
Dept: FAMILY MEDICINE | Facility: CLINIC | Age: 59
End: 2024-12-11
Payer: COMMERCIAL

## 2024-12-11 DIAGNOSIS — M54.2 NECK PAIN: ICD-10-CM

## 2024-12-11 DIAGNOSIS — Z23 ENCOUNTER FOR IMMUNIZATION: Primary | ICD-10-CM

## 2024-12-11 PROCEDURE — 72040 X-RAY EXAM NECK SPINE 2-3 VW: CPT | Mod: TC | Performed by: RADIOLOGY

## 2024-12-11 PROCEDURE — 90750 HZV VACC RECOMBINANT IM: CPT

## 2024-12-11 PROCEDURE — 90471 IMMUNIZATION ADMIN: CPT

## 2024-12-11 NOTE — PROGRESS NOTES
[id number] to immunization administration, verified patients identity using patient s name and date of birth. Please see Immunization Activity for additional information.     Is the patient's temperature normal (100.5 or less)? Yes     Patient MEETS CRITERIA. PROCEED with vaccine administration.      Patient instructed to remain in clinic for 15 minutes afterwards, and to report any adverse reactions.      Link to Ancillary Visit Immunization Standing Orders SmartSet     Screening performed by Chelita Sainz on 12/11/2024 at 5:18 PM.

## 2025-04-08 ENCOUNTER — OFFICE VISIT (OUTPATIENT)
Dept: FAMILY MEDICINE | Facility: CLINIC | Age: 60
End: 2025-04-08
Payer: COMMERCIAL

## 2025-04-08 VITALS
TEMPERATURE: 98.2 F | HEIGHT: 69 IN | OXYGEN SATURATION: 97 % | WEIGHT: 210 LBS | HEART RATE: 66 BPM | RESPIRATION RATE: 18 BRPM | SYSTOLIC BLOOD PRESSURE: 150 MMHG | DIASTOLIC BLOOD PRESSURE: 98 MMHG | BODY MASS INDEX: 31.1 KG/M2

## 2025-04-08 DIAGNOSIS — R03.0 ELEVATED BLOOD PRESSURE READING WITHOUT DIAGNOSIS OF HYPERTENSION: ICD-10-CM

## 2025-04-08 DIAGNOSIS — R35.89 POLYURIA: Primary | ICD-10-CM

## 2025-04-08 DIAGNOSIS — N50.89 SCROTAL SWELLING: ICD-10-CM

## 2025-04-08 DIAGNOSIS — S46.819A STRAIN OF TRAPEZIUS MUSCLE, UNSPECIFIED LATERALITY, INITIAL ENCOUNTER: ICD-10-CM

## 2025-04-08 LAB
ALBUMIN UR-MCNC: NEGATIVE MG/DL
ANION GAP SERPL CALCULATED.3IONS-SCNC: 16 MMOL/L (ref 7–15)
APPEARANCE UR: CLEAR
BILIRUB UR QL STRIP: NEGATIVE
BUN SERPL-MCNC: 20.3 MG/DL (ref 6–20)
CALCIUM SERPL-MCNC: 10 MG/DL (ref 8.8–10.4)
CHLORIDE SERPL-SCNC: 103 MMOL/L (ref 98–107)
COLOR UR AUTO: YELLOW
CREAT SERPL-MCNC: 0.91 MG/DL (ref 0.67–1.17)
EGFRCR SERPLBLD CKD-EPI 2021: >90 ML/MIN/1.73M2
EST. AVERAGE GLUCOSE BLD GHB EST-MCNC: 108 MG/DL
GLUCOSE SERPL-MCNC: 101 MG/DL (ref 70–99)
GLUCOSE UR STRIP-MCNC: NEGATIVE MG/DL
HBA1C MFR BLD: 5.4 % (ref 0–5.6)
HCO3 SERPL-SCNC: 23 MMOL/L (ref 22–29)
HGB UR QL STRIP: NEGATIVE
KETONES UR STRIP-MCNC: NEGATIVE MG/DL
LEUKOCYTE ESTERASE UR QL STRIP: NEGATIVE
NITRATE UR QL: NEGATIVE
PH UR STRIP: 6 [PH] (ref 5–7)
POTASSIUM SERPL-SCNC: 4.1 MMOL/L (ref 3.4–5.3)
PSA SERPL DL<=0.01 NG/ML-MCNC: 2.22 NG/ML (ref 0–3.5)
SODIUM SERPL-SCNC: 142 MMOL/L (ref 135–145)
SP GR UR STRIP: 1.02 (ref 1–1.03)
UROBILINOGEN UR STRIP-ACNC: 0.2 E.U./DL

## 2025-04-08 PROCEDURE — G0103 PSA SCREENING: HCPCS | Performed by: FAMILY MEDICINE

## 2025-04-08 PROCEDURE — 83036 HEMOGLOBIN GLYCOSYLATED A1C: CPT | Performed by: FAMILY MEDICINE

## 2025-04-08 PROCEDURE — 3080F DIAST BP >= 90 MM HG: CPT | Performed by: FAMILY MEDICINE

## 2025-04-08 PROCEDURE — 99214 OFFICE O/P EST MOD 30 MIN: CPT | Performed by: FAMILY MEDICINE

## 2025-04-08 PROCEDURE — 81003 URINALYSIS AUTO W/O SCOPE: CPT | Performed by: FAMILY MEDICINE

## 2025-04-08 PROCEDURE — G2211 COMPLEX E/M VISIT ADD ON: HCPCS | Performed by: FAMILY MEDICINE

## 2025-04-08 PROCEDURE — 80048 BASIC METABOLIC PNL TOTAL CA: CPT | Performed by: FAMILY MEDICINE

## 2025-04-08 PROCEDURE — 3077F SYST BP >= 140 MM HG: CPT | Performed by: FAMILY MEDICINE

## 2025-04-08 PROCEDURE — 1125F AMNT PAIN NOTED PAIN PRSNT: CPT | Performed by: FAMILY MEDICINE

## 2025-04-08 PROCEDURE — 36415 COLL VENOUS BLD VENIPUNCTURE: CPT | Performed by: FAMILY MEDICINE

## 2025-04-08 RX ORDER — CYCLOBENZAPRINE HCL 5 MG
5 TABLET ORAL DAILY PRN
Qty: 20 TABLET | Refills: 0 | Status: SHIPPED | OUTPATIENT
Start: 2025-04-08

## 2025-04-08 ASSESSMENT — PAIN SCALES - GENERAL: PAINLEVEL_OUTOF10: MODERATE PAIN (5)

## 2025-04-08 NOTE — PROGRESS NOTES
"  Assessment & Plan     Polyuria  Scrotal swelling  -- reports increase urination. On oxybutynin which is no longer helping. Has a large scrotal swelling on exam. Potentially mechanical cause for increased urination at night. Plan to check UA, PSA, BMP to rule out other culprits for the increased urination symptoms. Will obtain scrotal US for further evaluation of the scrotal swelling. Refer to Urology for further evaluation and cares.   - UA Macroscopic with reflex to Microscopic and Culture - Lab Collect  - PSA, screen  - Hemoglobin A1c  - Basic metabolic panel  (Ca, Cl, CO2, Creat, Gluc, K, Na, BUN)    - Adult Urology  Referral  - US Testicular & Scrotum w Doppler Ltd    Strain of trapezius muscle, unspecified laterality, initial encounter  Xray in past with minimal arthritis. Likely msk with trapezius muscle strain. Has been referred to physical therapy in the past and recommended he follow up with this. Discussed conservative cares. Flexeril as needed.   - cyclobenzaprine (FLEXERIL) 5 MG tablet  Dispense: 20 tablet; Refill: 0    Elevated blood pressure reading without diagnosis of hypertension  BP elevated here in clinic. Asymptomatic. Plan to follow up in 2 weeks for recheck.     The risks, benefits and treatment options of prescribed medications or other treatments have been discussed with the patient. The patient verbalized their understanding and should call or follow up if no improvement or if they develop further problems.    The longitudinal plan of care for the diagnosis(es)/condition(s) as documented were addressed during this visit. Due to the added complexity in care, I will continue to support patient in the subsequent management and with ongoing continuity of care.        BMI  Estimated body mass index is 31.47 kg/m  as calculated from the following:    Height as of this encounter: 1.74 m (5' 8.5\").    Weight as of this encounter: 95.3 kg (210 lb).   Weight management plan: Discussed " healthy diet and exercise guidelines        Subjective   Micah is a 59 year old, presenting for the following health issues:  Urinary Problem      4/8/2025     6:44 AM   Additional Questions   Roomed by Jenifer RG     History of Present Illness       Reason for visit:  Groin issue  excessive urinating  Symptom onset:  More than a month  Symptoms include:  Enlarged bowel  Symptom intensity:  Mild  Symptom progression:  Worsening  Had these symptoms before:  No  What makes it worse:  Pressure on groin   He is taking medications regularly.          Reports a bulge in the scrotal area   Ongoing for quite some time about 1 year.   Reports had a hernia in the past, surgery for bilateral inguinal hernia in 2020.   No burning with urination.   Waking 2-3 times at night to urinate.   No blood in the urine.   No significant abdominal pain.   No issues with bowels.   on oxybutynin for bladder spasms and to help with night time urination.      Neck discomfort   Goes to a chiropractor and this helps for a day.   Feels quite a bit of tension in the neck.   Referred to physical therapy in the past.   Has tried icy hot for the neck which occasionally helps.   Occasionally will use ibuprofen which helps.   Has tried heat.   No radiation down the arms.   No numbness or tingling in hands.     Cervical spine xray 12/111/2024  IMPRESSION: C7 and the cervicothoracic junction are partially obscured on lateral views by overlapping structures. Straightened cervical lordosis. No radiographic evidence for fracture or subluxation as visualized. Mild cervical spondylosis, with loss of   disc height and endplate spurring greatest at C5-6. Prevertebral soft tissues within normal limits for thickness.        Elevated blood pressure without diagnosis of HTN  BP elevated here in clinic. Asymptomatic.           Review of Systems  Constitutional, HEENT, cardiovascular, pulmonary, gi and gu systems are negative, except as otherwise noted.      Objective   "  BP (!) 150/100 (BP Location: Right arm, Patient Position: Chair, Cuff Size: Adult Regular)   Pulse 66   Temp 98.2  F (36.8  C) (Oral)   Resp 18   Ht 1.74 m (5' 8.5\")   Wt 95.3 kg (210 lb)   SpO2 97%   BMI 31.47 kg/m    Body mass index is 31.47 kg/m .  Physical Exam   General: alert, cooperative, no acute distress   Neck: non-tender over cervical spine. Tension in trapezius muscles bilaterally. Neck ROM slightly limited in rotation otherwise full.   CV: RRR, no murmur  Resp: non-labored breathing, clear to auscultation, no wheezing or rales   Abdomen: Soft, non-tender, no guarding.   Extremities: No peripheral edema, calves non-tender.       Signed Electronically by: Juventino Jin DO    "

## 2025-04-08 NOTE — PATIENT INSTRUCTIONS
Lab work today blood and urine.     Proceed with scrotal US     Follow up Urology for next steps and cares.     If evidence of hernia will refer to general surgery.     For the neck, heat, ibuprofen, flexeril as needed. Try and limit use of flexeril.     Follow up with myself in 2 weeks.       HOW TO CHECK YOUR BLOOD PRESSURE AT HOME:      Avoid eating, smoking, and exercising for at least 30 minutes before taking a reading.     Be sure you have taken your BP medication at least 2-3 hours before you check it.      Sit quietly for 10 minutes before a reading.      Sit in a chair with your feet flat on the floor. Rest your  arm on a table so that the arm cuff is at the same level as your heart.     Remain still during the reading.     Record your blood pressure and pulse in a log and bring to your next appointment.    BP goal at home is less than 140/90.

## 2025-04-18 ENCOUNTER — HOSPITAL ENCOUNTER (OUTPATIENT)
Dept: ULTRASOUND IMAGING | Facility: HOSPITAL | Age: 60
Discharge: HOME | End: 2025-04-18
Attending: FAMILY MEDICINE | Admitting: FAMILY MEDICINE
Payer: COMMERCIAL

## 2025-04-18 DIAGNOSIS — N50.89 SCROTAL SWELLING: ICD-10-CM

## 2025-04-18 PROCEDURE — 93976 VASCULAR STUDY: CPT

## 2025-04-28 ENCOUNTER — OFFICE VISIT (OUTPATIENT)
Dept: FAMILY MEDICINE | Facility: CLINIC | Age: 60
End: 2025-04-28
Payer: COMMERCIAL

## 2025-04-28 VITALS
WEIGHT: 212 LBS | HEIGHT: 69 IN | SYSTOLIC BLOOD PRESSURE: 122 MMHG | BODY MASS INDEX: 31.4 KG/M2 | HEART RATE: 74 BPM | OXYGEN SATURATION: 97 % | RESPIRATION RATE: 18 BRPM | DIASTOLIC BLOOD PRESSURE: 74 MMHG | TEMPERATURE: 97.6 F

## 2025-04-28 DIAGNOSIS — E78.5 HYPERLIPIDEMIA LDL GOAL <130: ICD-10-CM

## 2025-04-28 DIAGNOSIS — R03.0 ELEVATED BLOOD PRESSURE READING WITHOUT DIAGNOSIS OF HYPERTENSION: ICD-10-CM

## 2025-04-28 DIAGNOSIS — N43.3 LEFT HYDROCELE: Primary | ICD-10-CM

## 2025-04-28 PROCEDURE — 1126F AMNT PAIN NOTED NONE PRSNT: CPT | Performed by: NURSE PRACTITIONER

## 2025-04-28 PROCEDURE — 3074F SYST BP LT 130 MM HG: CPT | Performed by: NURSE PRACTITIONER

## 2025-04-28 PROCEDURE — 99214 OFFICE O/P EST MOD 30 MIN: CPT | Performed by: NURSE PRACTITIONER

## 2025-04-28 PROCEDURE — 3078F DIAST BP <80 MM HG: CPT | Performed by: NURSE PRACTITIONER

## 2025-04-28 ASSESSMENT — PAIN SCALES - GENERAL: PAINLEVEL_OUTOF10: NO PAIN (0)

## 2025-04-28 NOTE — PROGRESS NOTES
"  Assessment & Plan     Left hydrocele    Reviewed and clarified findings of recent testicular U/S. He has a large left hydrocele. He will see urology tomorrow for further evaluation.    HYPERLIPIDEMIA LDL GOAL <130    - Lipid panel reflex to direct LDL Fasting; Future  Last lipid was in 2023, CV risk was 12.6 %. After discussion, he wishes to return for fasting labs to do another check of lipids.  He may consider a trial of Crestor if CV risk remains elevated.      Elevated blood pressure reading without diagnosis of hypertension    Blood pressure today in clinic was good. Reviewed lifestyle modifications to help control hypertension.  He was also advised to purchase home monitor if able to continue close monitoring.              Follow-up   Return in about 6 months (around 10/28/2025) for or sooner if symptoms persist or worsen, Physical Exam, Med Check, Lab Work.        Reena Obrien is a 59 year old, presenting for the following health issues:  Hypertension and Lipids        4/28/2025     3:55 PM   Additional Questions   Roomed by Nataly     History of Present Illness       Hyperlipidemia:  He presents for follow up of hyperlipidemia.   He is not taking medication to lower cholesterol. He is not having myalgia or other side effects to statin medications.    Hypertension: He presents for follow up of hypertension.  He does not check blood pressure  regularly outside of the clinic. Outside blood pressures have been over 140/90. He does not follow a low salt diet.     He eats 0-1 servings of fruits and vegetables daily.He consumes 2 sweetened beverage(s) daily.He exercises with enough effort to increase his heart rate 20 to 29 minutes per day.  He exercises with enough effort to increase his heart rate 5 days per week.   He is taking medications regularly.      He has a few questions.  He is seeing urology tomorrow for a \"bowel obstruction\"  He had elevated blood pressure in clinic with his PCP and is here for " "recheck of that.  He also has had issues with hyperlipidemia in the past. He did not tolerate statins.                Review of Systems  Constitutional, HEENT, cardiovascular, pulmonary, gi and gu systems are negative, except as otherwise noted.      Objective    /74   Pulse 74   Temp 97.6  F (36.4  C) (Tympanic)   Resp 18   Ht 1.74 m (5' 8.5\")   Wt 96.2 kg (212 lb)   SpO2 97%   BMI 31.77 kg/m    Body mass index is 31.77 kg/m .  Physical Exam   GENERAL: healthy, alert and no distress  NECK: no adenopathy, no asymmetry  RESP: lungs clear to auscultation - no rales, rhonchi or wheezes  CV: regular rate and rhythm, normal S1 S2, no S3 or S4, no murmur  ABDOMEN: soft, umbilical hernia, non tender, no hepatosplenomegaly, no masses and bowel sounds normal  Declining genital exam  MS: no gross musculoskeletal defects noted              Signed Electronically by: FLORENCE Arreoal CNP    "

## 2025-04-28 NOTE — PATIENT INSTRUCTIONS
"Recommend purchasing home blood pressure monitor and watching your pressure at home, notify if it is above 140/90.  Return to have fasting cholesterol lab test done.  See urology tomorrow.      When you are out of refills or the refills say \"zero\", it is time to schedule your next appointment in clinic!    Labs are released to you almost immediately and sometimes before I have had a chance to review them.  I review labs regularly and once they are all in, you will either be sent a letter with your results or if you are signed up for on-line services, you will be notified that results are available to you on [facility]. If there are serious findings, you typically will be called.    If you have any questions about your visit, your symptoms, your medication, your test results or it is not clear what your diagnosis or treatment plan is please contact me (via [employer]) or call the care team at [phone] and say \"Care Team\"          "

## 2025-05-06 ENCOUNTER — OFFICE VISIT (OUTPATIENT)
Dept: UROLOGY | Facility: CLINIC | Age: 60
End: 2025-05-06
Attending: FAMILY MEDICINE
Payer: COMMERCIAL

## 2025-05-06 VITALS
SYSTOLIC BLOOD PRESSURE: 134 MMHG | WEIGHT: 212 LBS | DIASTOLIC BLOOD PRESSURE: 78 MMHG | HEART RATE: 79 BPM | BODY MASS INDEX: 31.77 KG/M2 | OXYGEN SATURATION: 96 % | TEMPERATURE: 98.2 F

## 2025-05-06 DIAGNOSIS — N43.3 LEFT HYDROCELE: ICD-10-CM

## 2025-05-06 DIAGNOSIS — R39.15 URINARY URGENCY: Primary | ICD-10-CM

## 2025-05-06 RX ORDER — SOLIFENACIN SUCCINATE 5 MG/1
5 TABLET, FILM COATED ORAL DAILY
Qty: 90 TABLET | Refills: 1 | Status: SHIPPED | OUTPATIENT
Start: 2025-05-06

## 2025-05-06 ASSESSMENT — PAIN SCALES - GENERAL: PAINLEVEL_OUTOF10: NO PAIN (0)

## 2025-05-06 NOTE — PROGRESS NOTES
Active order to obtain bladder scan? Yes   Name of ordering provider:  Margot AKHTAR  Bladder scan preformed post void yes.  Bladder scan reveled 9 ML  Provider notified?  Yes    Zulma Casanova MA on 5/6/2025 at 1:52 PM

## 2025-05-06 NOTE — PROGRESS NOTES
Chief Complaint:   Scrotal swelling          History of Present Illness:   Micah Lay is a 59 year old male with a history of hypothyroidism and HLD who presents for evaluation of scrotal swelling.     The patient reports a three month history of worsening left scrotal swelling. It is somewhat bothersome.     He reports urinary frequency, urgency, and nocturia x 2. He was started on oxybutynin a year ago and felt it was helpful initially. He reports an occasional weak stream.          Past Medical History:     Past Medical History:   Diagnosis Date    Hypertension     Other and unspecified hyperlipidemia may 2006    Sprain of lumbar region     Unspecified hypothyroidism             Past Surgical History:     Past Surgical History:   Procedure Laterality Date    LAPAROSCOPIC HERNIORRHAPHY INGUINAL BILATERAL Bilateral 6/12/2020    Procedure: Laparoscopic bilateral inguinal hernia repair with mesh;  Surgeon: Jd Wheatley MD;  Location: [facility]    REPAIR TENDON ACHILLES Right 4/6/2021    Procedure: Achilles tendon repair, right;  Surgeon: Uri Baird DPM;  Location: [facility]            Medications     Current Outpatient Medications   Medication Sig Dispense Refill    COLLAGEN MATRIX, BOVINE, EX Externally apply topically.      cyclobenzaprine (FLEXERIL) 5 MG tablet TAKE 1 TABLET (5 MG) BY MOUTH DAILY AS NEEDED FOR MUSCLE SPASMS. 20 tablet 0    ibuprofen (ADVIL/MOTRIN) 600 MG tablet Take 600 mg by mouth every 6 hours as needed for mild pain.      levothyroxine (SYNTHROID/LEVOTHROID) 100 MCG tablet Take with 125 mcg tablet to equal 225 mcg daily. 90 tablet 3    levothyroxine (SYNTHROID/LEVOTHROID) 125 MCG tablet Take with 100 mcg tablet to equal 225 mcg daily. 90 tablet 3    multivitamin w/minerals (THERA-VIT-M) tablet Take 1 tablet by mouth daily      oxyBUTYnin ER (DITROPAN XL) 10 MG 24 hr tablet Take 1 tablet (10 mg) by mouth daily. 90 tablet 3    doxylamine (UNISOM) 25 MG TABS tablet Take 25 mg by  "mouth at bedtime. (Patient not taking: Reported on 5/6/2025)      Melatonin 10 MG TBCR Take 1 tablet by mouth nightly as needed. (Patient not taking: Reported on 4/28/2025)       No current facility-administered medications for this visit.            Allergies:   Patient has no known allergies.         Review of Systems:  From intake questionnaire   Negative 14 system review except as noted on HPI, nurse's note.         Physical Exam:   Patient is a 59 year old  male   Vitals: Blood pressure 134/78, pulse 79, temperature 98.2  F (36.8  C), weight 96.2 kg (212 lb), SpO2 96%.  General Appearance Adult: Alert, no acute distress, oriented.  Lungs: Non-labored breathing.  Heart: No obvious jugular venous distension present.  Neuro: Alert, oriented, speech and mentation normal  : large left hydrocele visible and palpable, no tenderness    PVR: 9 mL      Labs and Pathology:    I personally reviewed all applicable laboratory data and went over findings with patient  Significant for:    CBC RESULTS:  No results for input(s): \"WBC\", \"HGB\", \"PLT\" in the last 02382 hours.     BMP RESULTS:  Recent Labs   Lab Test 04/08/25  0755 10/02/24  1752 02/01/23  0939    139 142   POTASSIUM 4.1 4.0 4.0   CHLORIDE 103 103 103   CO2 23 25 30*   ANIONGAP 16* 11 9   * 89 93   BUN 20.3* 21.7* 21.8*   CR 0.91 0.94 0.90   GFRESTIMATED >90 >90 >90   MIRIAM 10.0 9.5 9.8       UA RESULTS:   Recent Labs   Lab Test 04/08/25  0755 04/12/21  1639   SG 1.025 1.026   URINEPH 6.0 5.0   NITRITE Negative Negative   RBCU  --  1   WBCU  --  <1       PSA RESULTS  PSA   Date Value Ref Range Status   01/06/2020 1.28 0 - 4 ug/L Final     Comment:     Assay Method:  Chemiluminescence using Siemens Vista analyzer   09/11/2017 1.11 0 - 4 ug/L Final     Comment:     Assay Method:  Chemiluminescence using Siemens Vista analyzer     Prostate Specific Antigen Screen   Date Value Ref Range Status   04/08/2025 2.22 0.00 - 3.50 ng/mL Final   02/01/2023 1.29 0.00 " - 3.50 ng/mL Final           Imaging:    I personally reviewed all applicable imaging and went over findings with patient.  Significant for:    Results for orders placed or performed during the hospital encounter of 04/18/25   US Testicular & Scrotum w Doppler Ltd    Narrative    EXAM: US TESTICULAR AND SCROTUM WITH DOPPLER LIMITED  LOCATION: [facility]  DATE: 4/18/2025    INDICATION: Scrotal swelling.  COMPARISON: None.  TECHNIQUE: Ultrasound of scrotum with color flow and spectral Doppler with waveform analysis performed.    FINDINGS:    RIGHT: Right testicle measures 5.6 x 3.3 x 2.5 cm. Normal testicle with no masses. Normal arterial duplex and normal color flow. Normal epididymis. No hydrocele. No varicocele.    LEFT: Left testicle measures 6.7 x 2.6 x 2.6 cm. Normal testicle with no masses. Small paratesticular appendage (normal variant). Normal arterial duplex and normal color flow. Normal epididymis. Large simple hydrocele. No varicocele.      Impression    IMPRESSION:    1.  Large left hydrocele.    2.  Otherwise, normal testicular ultrasound.            Assessment and Plan:     Assessment: 59 year old male seen in evaluation for a large left hydrocele confirmed on testicular US from 4/18/2025. We discussed that hydroceles are benign, but if bothersome, treatment could include aspiration or hydrocelectomy. He would be interested in initial aspiration, knowing the hydrocele could recur. I will schedule him with Dr. Ronquillo.     He reports urinary frequency, urgency, and nocturia x 2. He was started on oxybutynin a year ago and felt it was helpful initially. He reports an occasional weak stream. His PVR today was 9 mL. We discussed options for treatment including continuing with his current medication regimen, increasing the oxybutynin dose, trying another anticholinergic, or trying tamsulosin. He would like to try an alternative anticholinergic.     Plan:  Left hydrocele consult and  aspiration with Dr. Ronquillo.   Stop oxybutynin. Start solifenacin 5 mg daily. Side effects reviewed.   Follow up in six months for LUTS.     Margot Rao PA-C  Department of Urology

## 2025-05-06 NOTE — NURSING NOTE
"Initial /78   Pulse 79   Temp 98.2  F (36.8  C)   Wt 96.2 kg (212 lb)   SpO2 96%   BMI 31.77 kg/m   Estimated body mass index is 31.77 kg/m  as calculated from the following:    Height as of 4/28/25: 1.74 m (5' 8.5\").    Weight as of this encounter: 96.2 kg (212 lb). .  Zulma Casanova MA on 5/6/2025 at 1:51 PM    "

## 2025-06-09 NOTE — PROGRESS NOTES
PROCEDURE NOTE      Procedure: Hydrocele drainage   Indications: Left hydrocele   Findings: Left hydrocele      Staff: Dr. Ronquillo   Resident: None  Anesthesia: 1% lidocaine   Complications: none  EBL: 0 cc      Description:   Pt gave verbal consent for procedure. His genitalia was prepped in the normal sterile fashion 10 cc of 1% lidocaine without epinephrine was injected under the skin on the anterior scrotum.  Local anesthesia was confirmed, 20G needle was inserted to the hydrocele and a total of 525 ml of clear straw simple fluid was aspirated.  Needle was then withdrawn. The patient remained stable and tolerated the procedure well without undue complications.    Plan   - Follow-up PRN for recurrence     Alex Ronquillo MD, MS  Department of Urology  Infertility, Sexual Medicine, Reconstruction  [facility]

## 2025-06-10 ENCOUNTER — OFFICE VISIT (OUTPATIENT)
Dept: UROLOGY | Facility: CLINIC | Age: 60
End: 2025-06-10
Payer: COMMERCIAL

## 2025-06-10 VITALS
BODY MASS INDEX: 31.4 KG/M2 | HEIGHT: 69 IN | WEIGHT: 212 LBS | HEART RATE: 82 BPM | OXYGEN SATURATION: 96 % | DIASTOLIC BLOOD PRESSURE: 74 MMHG | SYSTOLIC BLOOD PRESSURE: 132 MMHG

## 2025-06-10 DIAGNOSIS — N43.0 ENCYSTED HYDROCELE: Primary | ICD-10-CM

## 2025-06-10 ASSESSMENT — PAIN SCALES - GENERAL: PAINLEVEL_OUTOF10: NO PAIN (0)

## 2025-09-03 ENCOUNTER — PATIENT OUTREACH (OUTPATIENT)
Dept: CARE COORDINATION | Facility: CLINIC | Age: 60
End: 2025-09-03
Payer: COMMERCIAL

## (undated) DEVICE — DRAPE EXTREMITY W/ARMBOARD 29405

## (undated) DEVICE — SOL NACL 0.9% IRRIG 1000ML BOTTLE 07138-09

## (undated) DEVICE — SU ETHILON 4-0 PS-2 18" 1667G

## (undated) DEVICE — GOWN XLG DISP 9545

## (undated) DEVICE — BLADE CLIPPER 4406

## (undated) DEVICE — SU VICRYL 4-0 FS-2 27" J422-H

## (undated) DEVICE — SU VICRYL 3-0 CT-1 36" J944H

## (undated) DEVICE — STOCKING SLEEVE COMPRESSION CALF MED

## (undated) DEVICE — PREP CHLORAPREP 26ML TINTED ORANGE  260815

## (undated) DEVICE — CAST PADDING 6" WEBRIL UNSTERILE 3489

## (undated) DEVICE — SU PROLENE 5-0 P-3 18" 8698G

## (undated) DEVICE — GLOVE PROTEXIS W/NEU-THERA 8.0  2D73TE80

## (undated) DEVICE — DRSG JUMPSTART ANTIMICROBIAL 4X4" ABS-4004

## (undated) DEVICE — ESU HOLSTER PLASTIC DISP E2400

## (undated) DEVICE — SOL NACL 0.9% INJ 1000ML BAG 07983-09

## (undated) DEVICE — GEL ULTRASOUND AQUASONIC 20GM 01-01

## (undated) DEVICE — SU VICRYL 0 UR-6 27" J603H

## (undated) DEVICE — ESU ENDO SCISSORS 5MM CVD 5DCS

## (undated) DEVICE — Device

## (undated) DEVICE — DRSG GAUZE 4X4" TRAY

## (undated) DEVICE — BNDG ELASTIC 4" DBL LENGTH UNSTERILE 6611-14

## (undated) DEVICE — BLANKET BAIR HUGGER UPPER BODY 42268

## (undated) DEVICE — DEVICE ABSORBABLE TACK 5MM SINGLE ABSTACK30

## (undated) DEVICE — SOL WATER IRRIG 1000ML BOTTLE 07139-09

## (undated) DEVICE — DRSG ABDOMINAL 07 1/2X8" 7197D

## (undated) DEVICE — BLADE KNIFE SURG 10 371110

## (undated) DEVICE — GLOVE PROTEXIS BLUE W/NEU-THERA 8.0  2D73EB80

## (undated) DEVICE — ADH SKIN CLOSURE PREMIERPRO EXOFIN 1.0ML 3470

## (undated) DEVICE — DECANTER VIAL 2006S

## (undated) DEVICE — CAST PADDING 4" STERILE 9044S

## (undated) DEVICE — DRAPE SHEET REV FOLD 3/4 9349

## (undated) DEVICE — GLOVE PROTEXIS W/NEU-THERA 7.5  2D73TE75

## (undated) DEVICE — SU FIBERWIRE 2 38" AR-7202

## (undated) DEVICE — ENDO TROCAR BLUNT TIP KII BALLOON ADV FIX 12X100MM C0K11

## (undated) DEVICE — PACK EXTREMITY LATEX FREE SOP32HFFCS

## (undated) RX ORDER — LIDOCAINE HYDROCHLORIDE 10 MG/ML
INJECTION, SOLUTION EPIDURAL; INFILTRATION; INTRACAUDAL; PERINEURAL
Status: DISPENSED
Start: 2021-04-06

## (undated) RX ORDER — DEXAMETHASONE SODIUM PHOSPHATE 4 MG/ML
INJECTION, SOLUTION INTRA-ARTICULAR; INTRALESIONAL; INTRAMUSCULAR; INTRAVENOUS; SOFT TISSUE
Status: DISPENSED
Start: 2020-06-12

## (undated) RX ORDER — LIDOCAINE HYDROCHLORIDE 10 MG/ML
INJECTION, SOLUTION EPIDURAL; INFILTRATION; INTRACAUDAL; PERINEURAL
Status: DISPENSED
Start: 2020-06-12

## (undated) RX ORDER — CEFAZOLIN SODIUM 2 G/100ML
INJECTION, SOLUTION INTRAVENOUS
Status: DISPENSED
Start: 2020-06-12

## (undated) RX ORDER — DEXAMETHASONE SODIUM PHOSPHATE 10 MG/ML
INJECTION, SOLUTION INTRAMUSCULAR; INTRAVENOUS
Status: DISPENSED
Start: 2021-04-06

## (undated) RX ORDER — ACETAMINOPHEN 325 MG/1
TABLET ORAL
Status: DISPENSED
Start: 2020-06-12

## (undated) RX ORDER — GABAPENTIN 300 MG/1
CAPSULE ORAL
Status: DISPENSED
Start: 2020-06-12

## (undated) RX ORDER — HYDROCODONE BITARTRATE AND ACETAMINOPHEN 5; 325 MG/1; MG/1
TABLET ORAL
Status: DISPENSED
Start: 2020-06-12

## (undated) RX ORDER — LIDOCAINE HCL/EPINEPHRINE/PF 2%-1:200K
VIAL (ML) INJECTION
Status: DISPENSED
Start: 2021-04-06

## (undated) RX ORDER — CELECOXIB 200 MG/1
CAPSULE ORAL
Status: DISPENSED
Start: 2020-06-12

## (undated) RX ORDER — FENTANYL CITRATE 50 UG/ML
INJECTION, SOLUTION INTRAMUSCULAR; INTRAVENOUS
Status: DISPENSED
Start: 2020-06-12

## (undated) RX ORDER — ACETAMINOPHEN 325 MG/1
TABLET ORAL
Status: DISPENSED
Start: 2021-04-06

## (undated) RX ORDER — FENTANYL CITRATE 50 UG/ML
INJECTION, SOLUTION INTRAMUSCULAR; INTRAVENOUS
Status: DISPENSED
Start: 2021-04-06

## (undated) RX ORDER — ONDANSETRON 2 MG/ML
INJECTION INTRAMUSCULAR; INTRAVENOUS
Status: DISPENSED
Start: 2021-04-06

## (undated) RX ORDER — KETOROLAC TROMETHAMINE 30 MG/ML
INJECTION, SOLUTION INTRAMUSCULAR; INTRAVENOUS
Status: DISPENSED
Start: 2021-04-06

## (undated) RX ORDER — GABAPENTIN 300 MG/1
CAPSULE ORAL
Status: DISPENSED
Start: 2021-04-06

## (undated) RX ORDER — GLYCOPYRROLATE 0.2 MG/ML
INJECTION, SOLUTION INTRAMUSCULAR; INTRAVENOUS
Status: DISPENSED
Start: 2021-04-06

## (undated) RX ORDER — MAGNESIUM SULFATE HEPTAHYDRATE 40 MG/ML
INJECTION, SOLUTION INTRAVENOUS
Status: DISPENSED
Start: 2021-04-06

## (undated) RX ORDER — PROPOFOL 10 MG/ML
INJECTION, EMULSION INTRAVENOUS
Status: DISPENSED
Start: 2020-06-12

## (undated) RX ORDER — PROPOFOL 10 MG/ML
INJECTION, EMULSION INTRAVENOUS
Status: DISPENSED
Start: 2021-04-06

## (undated) RX ORDER — EPHEDRINE SULFATE 50 MG/ML
INJECTION, SOLUTION INTRAMUSCULAR; INTRAVENOUS; SUBCUTANEOUS
Status: DISPENSED
Start: 2021-04-06

## (undated) RX ORDER — BUPIVACAINE HYDROCHLORIDE AND EPINEPHRINE 5; 5 MG/ML; UG/ML
INJECTION, SOLUTION EPIDURAL; INTRACAUDAL; PERINEURAL
Status: DISPENSED
Start: 2020-06-12

## (undated) RX ORDER — ROPIVACAINE HYDROCHLORIDE 7.5 MG/ML
INJECTION, SOLUTION EPIDURAL; PERINEURAL
Status: DISPENSED
Start: 2021-04-06

## (undated) RX ORDER — CEFAZOLIN SODIUM 2 G/100ML
INJECTION, SOLUTION INTRAVENOUS
Status: DISPENSED
Start: 2021-04-06

## (undated) RX ORDER — ONDANSETRON 2 MG/ML
INJECTION INTRAMUSCULAR; INTRAVENOUS
Status: DISPENSED
Start: 2020-06-12